# Patient Record
Sex: MALE | Race: WHITE | Employment: OTHER | URBAN - NONMETROPOLITAN AREA
[De-identification: names, ages, dates, MRNs, and addresses within clinical notes are randomized per-mention and may not be internally consistent; named-entity substitution may affect disease eponyms.]

---

## 2017-01-05 DIAGNOSIS — E11.9 TYPE 2 DIABETES MELLITUS WITHOUT COMPLICATION, WITHOUT LONG-TERM CURRENT USE OF INSULIN (HCC): Primary | ICD-10-CM

## 2017-01-06 RX ORDER — SITAGLIPTIN 100 MG/1
TABLET, FILM COATED ORAL
Qty: 30 TABLET | Refills: 11 | Status: SHIPPED | OUTPATIENT
Start: 2017-01-06 | End: 2018-01-08 | Stop reason: SDUPTHER

## 2017-01-17 ENCOUNTER — OFFICE VISIT (OUTPATIENT)
Dept: FAMILY MEDICINE CLINIC | Age: 52
End: 2017-01-17

## 2017-01-17 VITALS
OXYGEN SATURATION: 95 % | HEART RATE: 96 BPM | WEIGHT: 301 LBS | BODY MASS INDEX: 39.71 KG/M2 | DIASTOLIC BLOOD PRESSURE: 64 MMHG | SYSTOLIC BLOOD PRESSURE: 128 MMHG

## 2017-01-17 DIAGNOSIS — K21.9 GASTROESOPHAGEAL REFLUX DISEASE, ESOPHAGITIS PRESENCE NOT SPECIFIED: ICD-10-CM

## 2017-01-17 DIAGNOSIS — E78.5 HYPERLIPIDEMIA, UNSPECIFIED HYPERLIPIDEMIA TYPE: ICD-10-CM

## 2017-01-17 DIAGNOSIS — I10 ESSENTIAL HYPERTENSION: ICD-10-CM

## 2017-01-17 DIAGNOSIS — B35.4 TINEA CORPORIS: ICD-10-CM

## 2017-01-17 DIAGNOSIS — E11.9 TYPE 2 DIABETES MELLITUS WITHOUT COMPLICATION, WITHOUT LONG-TERM CURRENT USE OF INSULIN (HCC): Primary | ICD-10-CM

## 2017-01-17 DIAGNOSIS — G47.33 OBSTRUCTIVE SLEEP APNEA SYNDROME: ICD-10-CM

## 2017-01-17 PROCEDURE — 3017F COLORECTAL CA SCREEN DOC REV: CPT | Performed by: FAMILY MEDICINE

## 2017-01-17 PROCEDURE — G8419 CALC BMI OUT NRM PARAM NOF/U: HCPCS | Performed by: FAMILY MEDICINE

## 2017-01-17 PROCEDURE — G8427 DOCREV CUR MEDS BY ELIG CLIN: HCPCS | Performed by: FAMILY MEDICINE

## 2017-01-17 PROCEDURE — G8484 FLU IMMUNIZE NO ADMIN: HCPCS | Performed by: FAMILY MEDICINE

## 2017-01-17 PROCEDURE — 99214 OFFICE O/P EST MOD 30 MIN: CPT | Performed by: FAMILY MEDICINE

## 2017-01-17 PROCEDURE — 1036F TOBACCO NON-USER: CPT | Performed by: FAMILY MEDICINE

## 2017-01-17 PROCEDURE — 3044F HG A1C LEVEL LT 7.0%: CPT | Performed by: FAMILY MEDICINE

## 2017-01-17 RX ORDER — METFORMIN HYDROCHLORIDE EXTENDED-RELEASE TABLETS 1000 MG/1
1000 TABLET, FILM COATED, EXTENDED RELEASE ORAL 2 TIMES DAILY WITH MEALS
Qty: 60 TABLET | Refills: 11 | Status: SHIPPED | OUTPATIENT
Start: 2017-01-17 | End: 2018-01-20 | Stop reason: SDUPTHER

## 2017-01-17 RX ORDER — KETOCONAZOLE 20 MG/ML
SHAMPOO TOPICAL
Qty: 1 BOTTLE | Refills: 2 | Status: SHIPPED | OUTPATIENT
Start: 2017-01-17 | End: 2017-07-31 | Stop reason: ALTCHOICE

## 2017-01-17 ASSESSMENT — PATIENT HEALTH QUESTIONNAIRE - PHQ9
1. LITTLE INTEREST OR PLEASURE IN DOING THINGS: 0
SUM OF ALL RESPONSES TO PHQ QUESTIONS 1-9: 0
SUM OF ALL RESPONSES TO PHQ9 QUESTIONS 1 & 2: 0
2. FEELING DOWN, DEPRESSED OR HOPELESS: 0

## 2017-01-26 DIAGNOSIS — G47.33 OBSTRUCTIVE SLEEP APNEA SYNDROME: Primary | ICD-10-CM

## 2017-01-27 RX ORDER — NAPROXEN 500 MG/1
500 TABLET ORAL 2 TIMES DAILY WITH MEALS
Qty: 60 TABLET | Refills: 5 | Status: SHIPPED | OUTPATIENT
Start: 2017-01-27 | End: 2017-08-09 | Stop reason: SDUPTHER

## 2017-02-02 ASSESSMENT — ENCOUNTER SYMPTOMS
ABDOMINAL PAIN: 0
VOMITING: 0
NAUSEA: 0
DIARRHEA: 1

## 2017-02-09 DIAGNOSIS — K21.9 GASTROESOPHAGEAL REFLUX DISEASE, ESOPHAGITIS PRESENCE NOT SPECIFIED: Primary | ICD-10-CM

## 2017-02-10 RX ORDER — FAMOTIDINE 40 MG/1
TABLET, FILM COATED ORAL
Qty: 30 TABLET | Refills: 5 | Status: SHIPPED | OUTPATIENT
Start: 2017-02-10 | End: 2017-08-09 | Stop reason: SDUPTHER

## 2017-02-27 DIAGNOSIS — I10 ESSENTIAL HYPERTENSION: ICD-10-CM

## 2017-02-28 RX ORDER — LISINOPRIL AND HYDROCHLOROTHIAZIDE 25; 20 MG/1; MG/1
TABLET ORAL
Qty: 30 TABLET | Refills: 4 | Status: SHIPPED | OUTPATIENT
Start: 2017-02-28 | End: 2017-08-09 | Stop reason: SDUPTHER

## 2017-03-24 DIAGNOSIS — M62.838 MUSCLE SPASTICITY: Primary | ICD-10-CM

## 2017-03-24 RX ORDER — CYCLOBENZAPRINE HCL 10 MG
TABLET ORAL
Qty: 60 TABLET | Refills: 5 | Status: SHIPPED | OUTPATIENT
Start: 2017-03-24 | End: 2018-03-22 | Stop reason: SDUPTHER

## 2017-04-11 DIAGNOSIS — E11.9 TYPE 2 DIABETES MELLITUS WITHOUT COMPLICATION, WITHOUT LONG-TERM CURRENT USE OF INSULIN (HCC): Primary | ICD-10-CM

## 2017-05-07 DIAGNOSIS — I10 ESSENTIAL HYPERTENSION: Primary | ICD-10-CM

## 2017-05-08 RX ORDER — METOPROLOL TARTRATE 50 MG/1
TABLET, FILM COATED ORAL
Qty: 180 TABLET | Refills: 3 | Status: SHIPPED | OUTPATIENT
Start: 2017-05-08 | End: 2018-05-01 | Stop reason: SDUPTHER

## 2017-05-22 ENCOUNTER — TELEPHONE (OUTPATIENT)
Dept: FAMILY MEDICINE CLINIC | Age: 52
End: 2017-05-22

## 2017-05-22 DIAGNOSIS — N20.0 RECURRENT KIDNEY STONES: Primary | ICD-10-CM

## 2017-05-22 DIAGNOSIS — R30.0 DYSURIA: ICD-10-CM

## 2017-05-22 DIAGNOSIS — N30.01 ACUTE CYSTITIS WITH HEMATURIA: Primary | ICD-10-CM

## 2017-05-22 LAB
-: ABNORMAL
AMORPHOUS: ABNORMAL
BACTERIA: ABNORMAL
CASTS UA: ABNORMAL /LPF (ref 0–2)
CRYSTALS, UA: ABNORMAL /HPF
EPITHELIAL CELLS UA: ABNORMAL /HPF (ref 0–5)
MUCUS: ABNORMAL
OTHER OBSERVATIONS UA: ABNORMAL
RBC UA: ABNORMAL /HPF (ref 0–4)
RENAL EPITHELIAL, UA: ABNORMAL /HPF
TRICHOMONAS: ABNORMAL
WBC UA: >100 /HPF (ref 0–4)
YEAST: ABNORMAL

## 2017-05-22 RX ORDER — CIPROFLOXACIN 500 MG/1
500 TABLET, FILM COATED ORAL 2 TIMES DAILY
Qty: 14 TABLET | Refills: 0 | Status: SHIPPED | OUTPATIENT
Start: 2017-05-22 | End: 2017-05-29

## 2017-05-24 LAB
CULTURE: ABNORMAL
Lab: ABNORMAL
ORGANISM: ABNORMAL
SPECIMEN DESCRIPTION: ABNORMAL
STATUS: ABNORMAL

## 2017-06-29 ENCOUNTER — TELEPHONE (OUTPATIENT)
Dept: FAMILY MEDICINE CLINIC | Age: 52
End: 2017-06-29

## 2017-06-29 DIAGNOSIS — B35.4 TINEA CORPORIS: Primary | ICD-10-CM

## 2017-06-30 PROBLEM — B35.4 TINEA CORPORIS: Status: ACTIVE | Noted: 2017-06-30

## 2017-06-30 RX ORDER — FLUCONAZOLE 150 MG/1
TABLET ORAL
Qty: 4 TABLET | Refills: 0 | Status: SHIPPED | OUTPATIENT
Start: 2017-06-30 | End: 2017-07-31 | Stop reason: ALTCHOICE

## 2017-07-31 ENCOUNTER — OFFICE VISIT (OUTPATIENT)
Dept: FAMILY MEDICINE CLINIC | Age: 52
End: 2017-07-31
Payer: COMMERCIAL

## 2017-07-31 VITALS
OXYGEN SATURATION: 96 % | HEART RATE: 92 BPM | SYSTOLIC BLOOD PRESSURE: 130 MMHG | BODY MASS INDEX: 41.75 KG/M2 | DIASTOLIC BLOOD PRESSURE: 80 MMHG | WEIGHT: 315 LBS | HEIGHT: 73 IN

## 2017-07-31 DIAGNOSIS — E66.01 MORBID OBESITY WITH BMI OF 40.0-44.9, ADULT (HCC): ICD-10-CM

## 2017-07-31 DIAGNOSIS — I10 ESSENTIAL HYPERTENSION: ICD-10-CM

## 2017-07-31 DIAGNOSIS — K21.9 GASTROESOPHAGEAL REFLUX DISEASE, ESOPHAGITIS PRESENCE NOT SPECIFIED: ICD-10-CM

## 2017-07-31 DIAGNOSIS — E11.9 TYPE 2 DIABETES MELLITUS WITHOUT COMPLICATION, WITHOUT LONG-TERM CURRENT USE OF INSULIN (HCC): Primary | ICD-10-CM

## 2017-07-31 DIAGNOSIS — G47.33 OBSTRUCTIVE SLEEP APNEA SYNDROME: ICD-10-CM

## 2017-07-31 DIAGNOSIS — G80.9 CEREBRAL PALSY, UNSPECIFIED TYPE (HCC): ICD-10-CM

## 2017-07-31 PROCEDURE — 99214 OFFICE O/P EST MOD 30 MIN: CPT | Performed by: FAMILY MEDICINE

## 2017-07-31 PROCEDURE — G8427 DOCREV CUR MEDS BY ELIG CLIN: HCPCS | Performed by: FAMILY MEDICINE

## 2017-07-31 PROCEDURE — 1036F TOBACCO NON-USER: CPT | Performed by: FAMILY MEDICINE

## 2017-07-31 PROCEDURE — 3017F COLORECTAL CA SCREEN DOC REV: CPT | Performed by: FAMILY MEDICINE

## 2017-07-31 PROCEDURE — 3046F HEMOGLOBIN A1C LEVEL >9.0%: CPT | Performed by: FAMILY MEDICINE

## 2017-07-31 PROCEDURE — G8417 CALC BMI ABV UP PARAM F/U: HCPCS | Performed by: FAMILY MEDICINE

## 2017-07-31 ASSESSMENT — ENCOUNTER SYMPTOMS
DIARRHEA: 0
CONSTIPATION: 0
SHORTNESS OF BREATH: 0
ABDOMINAL PAIN: 0
WHEEZING: 0
NAUSEA: 0
VOMITING: 0

## 2017-08-09 DIAGNOSIS — K21.9 GASTROESOPHAGEAL REFLUX DISEASE, ESOPHAGITIS PRESENCE NOT SPECIFIED: ICD-10-CM

## 2017-08-09 DIAGNOSIS — I10 ESSENTIAL HYPERTENSION: ICD-10-CM

## 2017-08-09 RX ORDER — FAMOTIDINE 40 MG/1
TABLET, FILM COATED ORAL
Qty: 30 TABLET | Refills: 5 | Status: SHIPPED | OUTPATIENT
Start: 2017-08-09 | End: 2017-12-28 | Stop reason: SDUPTHER

## 2017-08-09 RX ORDER — LISINOPRIL AND HYDROCHLOROTHIAZIDE 25; 20 MG/1; MG/1
TABLET ORAL
Qty: 30 TABLET | Refills: 11 | Status: SHIPPED | OUTPATIENT
Start: 2017-08-09 | End: 2017-12-29 | Stop reason: SDUPTHER

## 2017-09-13 DIAGNOSIS — B35.4 TINEA CORPORIS: ICD-10-CM

## 2017-09-13 RX ORDER — CLOTRIMAZOLE 1 %
CREAM (GRAM) TOPICAL
Qty: 45 G | Refills: 5 | Status: SHIPPED | OUTPATIENT
Start: 2017-09-13 | End: 2018-11-12 | Stop reason: SDUPTHER

## 2017-10-31 DIAGNOSIS — B35.4 TINEA CORPORIS: ICD-10-CM

## 2017-10-31 RX ORDER — FLUCONAZOLE 150 MG/1
TABLET ORAL
Qty: 4 TABLET | Refills: 0 | Status: SHIPPED | OUTPATIENT
Start: 2017-10-31 | End: 2018-01-15 | Stop reason: SDUPTHER

## 2017-12-28 DIAGNOSIS — K21.9 GASTROESOPHAGEAL REFLUX DISEASE, ESOPHAGITIS PRESENCE NOT SPECIFIED: ICD-10-CM

## 2017-12-28 DIAGNOSIS — I10 ESSENTIAL HYPERTENSION: ICD-10-CM

## 2017-12-28 NOTE — TELEPHONE ENCOUNTER
Pt still has refills on the lisinopril-hct but is requesting it be changed to 90 day supply. Last appt 7-31-17. Next appt 2-5-18.

## 2017-12-29 RX ORDER — FAMOTIDINE 40 MG/1
TABLET, FILM COATED ORAL
Qty: 90 TABLET | Refills: 3 | Status: SHIPPED | OUTPATIENT
Start: 2017-12-29 | End: 2018-03-06 | Stop reason: SDUPTHER

## 2017-12-29 RX ORDER — LISINOPRIL AND HYDROCHLOROTHIAZIDE 25; 20 MG/1; MG/1
1 TABLET ORAL DAILY
Qty: 90 TABLET | Refills: 3 | Status: SHIPPED | OUTPATIENT
Start: 2017-12-29 | End: 2018-08-27 | Stop reason: SDUPTHER

## 2018-01-08 DIAGNOSIS — E11.9 TYPE 2 DIABETES MELLITUS WITHOUT COMPLICATION, WITHOUT LONG-TERM CURRENT USE OF INSULIN (HCC): ICD-10-CM

## 2018-01-15 DIAGNOSIS — E11.9 TYPE 2 DIABETES MELLITUS WITHOUT COMPLICATION, WITHOUT LONG-TERM CURRENT USE OF INSULIN (HCC): Primary | ICD-10-CM

## 2018-01-15 DIAGNOSIS — B35.4 TINEA CORPORIS: ICD-10-CM

## 2018-01-15 RX ORDER — GLUCOSAMINE HCL/CHONDROITIN SU 500-400 MG
CAPSULE ORAL
Qty: 100 STRIP | Refills: 3 | Status: SHIPPED | OUTPATIENT
Start: 2018-01-15 | End: 2018-01-25 | Stop reason: SDUPTHER

## 2018-01-16 RX ORDER — FLUCONAZOLE 150 MG/1
TABLET ORAL
Qty: 4 TABLET | Refills: 0 | Status: SHIPPED | OUTPATIENT
Start: 2018-01-16 | End: 2018-02-05 | Stop reason: ALTCHOICE

## 2018-01-16 NOTE — TELEPHONE ENCOUNTER
Yes- he has brown/red circular patches on his back starting at waistline & traveling about 3/4 of the way up. Skin is very flaky & itchy. Pt also mentioned that he's looking into getting a new power chair & we may be receiving forms to fill out. Told him those generally require a face to face eval & pt will keep his Feb appt for that.

## 2018-01-17 NOTE — TELEPHONE ENCOUNTER
Diflucan Rx sent in; however, if pt continues to get this recurrently, may have to refer him to [facility] for further evaluation and long-term solution. Noted - will discuss new power chair at upcoming appt next month.

## 2018-01-20 DIAGNOSIS — E11.9 TYPE 2 DIABETES MELLITUS WITHOUT COMPLICATION, WITHOUT LONG-TERM CURRENT USE OF INSULIN (HCC): ICD-10-CM

## 2018-01-22 RX ORDER — METFORMIN HYDROCHLORIDE EXTENDED-RELEASE TABLETS 1000 MG/1
TABLET, FILM COATED, EXTENDED RELEASE ORAL
Qty: 60 TABLET | Refills: 11 | Status: SHIPPED | OUTPATIENT
Start: 2018-01-22 | End: 2018-01-23 | Stop reason: DRUGHIGH

## 2018-01-23 DIAGNOSIS — E11.9 TYPE 2 DIABETES MELLITUS WITHOUT COMPLICATION, WITHOUT LONG-TERM CURRENT USE OF INSULIN (HCC): ICD-10-CM

## 2018-01-23 RX ORDER — METFORMIN HYDROCHLORIDE 500 MG/1
TABLET, EXTENDED RELEASE ORAL
Qty: 120 TABLET | Refills: 11 | Status: SHIPPED | OUTPATIENT
Start: 2018-01-23 | End: 2019-02-11 | Stop reason: SDUPTHER

## 2018-01-25 DIAGNOSIS — I10 ESSENTIAL HYPERTENSION: Primary | ICD-10-CM

## 2018-01-25 DIAGNOSIS — Z12.5 SCREENING PSA (PROSTATE SPECIFIC ANTIGEN): ICD-10-CM

## 2018-01-25 DIAGNOSIS — Z13.220 SCREENING FOR LIPOID DISORDERS: ICD-10-CM

## 2018-01-25 DIAGNOSIS — E11.9 TYPE 2 DIABETES MELLITUS WITHOUT COMPLICATION, WITHOUT LONG-TERM CURRENT USE OF INSULIN (HCC): ICD-10-CM

## 2018-01-25 RX ORDER — LANCETS 30 GAUGE
1 EACH MISCELLANEOUS
Qty: 100 EACH | Refills: 1 | Status: SHIPPED | OUTPATIENT
Start: 2018-01-26 | End: 2018-03-22 | Stop reason: SDUPTHER

## 2018-01-25 RX ORDER — GLUCOSAMINE HCL/CHONDROITIN SU 500-400 MG
CAPSULE ORAL
Qty: 100 STRIP | Refills: 1 | Status: SHIPPED | OUTPATIENT
Start: 2018-01-25 | End: 2021-03-09 | Stop reason: SDUPTHER

## 2018-01-30 ENCOUNTER — TELEPHONE (OUTPATIENT)
Dept: FAMILY MEDICINE CLINIC | Age: 53
End: 2018-01-30

## 2018-01-31 ENCOUNTER — HOSPITAL ENCOUNTER (OUTPATIENT)
Dept: LAB | Age: 53
Setting detail: SPECIMEN
Discharge: HOME | End: 2018-01-31
Payer: COMMERCIAL

## 2018-01-31 DIAGNOSIS — Z12.5 SCREENING PSA (PROSTATE SPECIFIC ANTIGEN): ICD-10-CM

## 2018-01-31 DIAGNOSIS — Z13.220 SCREENING FOR LIPOID DISORDERS: ICD-10-CM

## 2018-01-31 DIAGNOSIS — I10 ESSENTIAL HYPERTENSION: ICD-10-CM

## 2018-01-31 DIAGNOSIS — E11.9 TYPE 2 DIABETES MELLITUS WITHOUT COMPLICATION, WITHOUT LONG-TERM CURRENT USE OF INSULIN (HCC): ICD-10-CM

## 2018-01-31 LAB
ALBUMIN SERPL-MCNC: 4 G/DL (ref 3.5–5.2)
ALBUMIN/GLOBULIN RATIO: 1.4 (ref 1–2.5)
ALP BLD-CCNC: 65 U/L (ref 40–129)
ALT SERPL-CCNC: 58 U/L (ref 5–41)
ANION GAP SERPL CALCULATED.3IONS-SCNC: 16 MMOL/L (ref 9–17)
AST SERPL-CCNC: 44 U/L
BILIRUB SERPL-MCNC: 0.54 MG/DL (ref 0.3–1.2)
BUN BLDV-MCNC: 12 MG/DL (ref 6–20)
BUN/CREAT BLD: 15 (ref 9–20)
CALCIUM SERPL-MCNC: 9.2 MG/DL (ref 8.6–10.4)
CHLORIDE BLD-SCNC: 97 MMOL/L (ref 98–107)
CHOLESTEROL/HDL RATIO: 7.4
CHOLESTEROL: 155 MG/DL
CO2: 24 MMOL/L (ref 20–31)
CREAT SERPL-MCNC: 0.79 MG/DL (ref 0.7–1.2)
ESTIMATED AVERAGE GLUCOSE: 200 MG/DL
GFR AFRICAN AMERICAN: >60 ML/MIN
GFR NON-AFRICAN AMERICAN: >60 ML/MIN
GFR SERPL CREATININE-BSD FRML MDRD: ABNORMAL ML/MIN/{1.73_M2}
GFR SERPL CREATININE-BSD FRML MDRD: ABNORMAL ML/MIN/{1.73_M2}
GLUCOSE BLD-MCNC: 209 MG/DL (ref 70–99)
HBA1C MFR BLD: 8.6 % (ref 4.8–5.9)
HDLC SERPL-MCNC: 21 MG/DL
LDL CHOLESTEROL DIRECT: 87 MG/DL
LDL CHOLESTEROL: ABNORMAL MG/DL (ref 0–130)
POTASSIUM SERPL-SCNC: 3.8 MMOL/L (ref 3.7–5.3)
PROSTATE SPECIFIC ANTIGEN: 0.43 UG/L
SODIUM BLD-SCNC: 137 MMOL/L (ref 135–144)
TOTAL PROTEIN: 6.8 G/DL (ref 6.4–8.3)
TRIGL SERPL-MCNC: 417 MG/DL
VLDLC SERPL CALC-MCNC: ABNORMAL MG/DL (ref 1–30)

## 2018-01-31 PROCEDURE — G0103 PSA SCREENING: HCPCS

## 2018-01-31 PROCEDURE — 83721 ASSAY OF BLOOD LIPOPROTEIN: CPT

## 2018-01-31 PROCEDURE — 36415 COLL VENOUS BLD VENIPUNCTURE: CPT

## 2018-01-31 PROCEDURE — 80053 COMPREHEN METABOLIC PANEL: CPT

## 2018-01-31 PROCEDURE — 83036 HEMOGLOBIN GLYCOSYLATED A1C: CPT

## 2018-01-31 PROCEDURE — 80061 LIPID PANEL: CPT

## 2018-02-05 ENCOUNTER — HOSPITAL ENCOUNTER (OUTPATIENT)
Age: 53
Setting detail: SPECIMEN
Discharge: HOME | End: 2018-02-05
Payer: COMMERCIAL

## 2018-02-05 ENCOUNTER — OFFICE VISIT (OUTPATIENT)
Dept: FAMILY MEDICINE CLINIC | Age: 53
End: 2018-02-05
Payer: COMMERCIAL

## 2018-02-05 VITALS
OXYGEN SATURATION: 98 % | HEART RATE: 92 BPM | BODY MASS INDEX: 40.29 KG/M2 | WEIGHT: 304 LBS | DIASTOLIC BLOOD PRESSURE: 80 MMHG | HEIGHT: 73 IN | SYSTOLIC BLOOD PRESSURE: 110 MMHG

## 2018-02-05 DIAGNOSIS — E11.9 TYPE 2 DIABETES MELLITUS WITHOUT COMPLICATION, WITHOUT LONG-TERM CURRENT USE OF INSULIN (HCC): Primary | ICD-10-CM

## 2018-02-05 DIAGNOSIS — G80.9 CEREBRAL PALSY, UNSPECIFIED TYPE (HCC): ICD-10-CM

## 2018-02-05 DIAGNOSIS — R74.01 ELEVATED TRANSAMINASE LEVEL: ICD-10-CM

## 2018-02-05 DIAGNOSIS — R39.89 ABNORMAL URINE COLOR: ICD-10-CM

## 2018-02-05 DIAGNOSIS — I10 ESSENTIAL HYPERTENSION: ICD-10-CM

## 2018-02-05 DIAGNOSIS — N30.01 ACUTE CYSTITIS WITH HEMATURIA: ICD-10-CM

## 2018-02-05 LAB
-: ABNORMAL
AMORPHOUS: ABNORMAL
BACTERIA: ABNORMAL
BILIRUBIN URINE: ABNORMAL
CASTS UA: ABNORMAL /LPF (ref 0–2)
COLOR: ABNORMAL
COMMENT UA: ABNORMAL
CRYSTALS, UA: ABNORMAL /HPF
EPITHELIAL CELLS UA: ABNORMAL /HPF (ref 0–5)
GLUCOSE URINE: NEGATIVE
KETONES, URINE: ABNORMAL
LEUKOCYTE ESTERASE, URINE: ABNORMAL
MUCUS: ABNORMAL
NITRITE, URINE: POSITIVE
OTHER OBSERVATIONS UA: ABNORMAL
PH UA: 5.5 (ref 5–6)
PROTEIN UA: ABNORMAL
RBC UA: ABNORMAL /HPF (ref 0–4)
RENAL EPITHELIAL, UA: ABNORMAL /HPF
SPECIFIC GRAVITY UA: 1.02 (ref 1.01–1.02)
TRICHOMONAS: ABNORMAL
TURBIDITY: ABNORMAL
URINE HGB: ABNORMAL
UROBILINOGEN, URINE: NORMAL
WBC UA: >50 /HPF (ref 0–4)
YEAST: ABNORMAL

## 2018-02-05 PROCEDURE — 81001 URINALYSIS AUTO W/SCOPE: CPT

## 2018-02-05 PROCEDURE — 87088 URINE BACTERIA CULTURE: CPT

## 2018-02-05 PROCEDURE — G8427 DOCREV CUR MEDS BY ELIG CLIN: HCPCS | Performed by: FAMILY MEDICINE

## 2018-02-05 PROCEDURE — 3017F COLORECTAL CA SCREEN DOC REV: CPT | Performed by: FAMILY MEDICINE

## 2018-02-05 PROCEDURE — 99214 OFFICE O/P EST MOD 30 MIN: CPT | Performed by: FAMILY MEDICINE

## 2018-02-05 PROCEDURE — 87086 URINE CULTURE/COLONY COUNT: CPT

## 2018-02-05 PROCEDURE — G8484 FLU IMMUNIZE NO ADMIN: HCPCS | Performed by: FAMILY MEDICINE

## 2018-02-05 PROCEDURE — 3045F PR MOST RECENT HEMOGLOBIN A1C LEVEL 7.0-9.0%: CPT | Performed by: FAMILY MEDICINE

## 2018-02-05 PROCEDURE — G8417 CALC BMI ABV UP PARAM F/U: HCPCS | Performed by: FAMILY MEDICINE

## 2018-02-05 PROCEDURE — 87186 SC STD MICRODIL/AGAR DIL: CPT

## 2018-02-05 PROCEDURE — 1036F TOBACCO NON-USER: CPT | Performed by: FAMILY MEDICINE

## 2018-02-05 RX ORDER — CIPROFLOXACIN 500 MG/1
500 TABLET, FILM COATED ORAL 2 TIMES DAILY
Qty: 14 TABLET | Refills: 0 | Status: SHIPPED | OUTPATIENT
Start: 2018-02-05 | End: 2018-02-12

## 2018-02-05 RX ORDER — BLOOD-GLUCOSE METER
EACH MISCELLANEOUS
Refills: 0 | COMMUNITY
Start: 2018-01-25 | End: 2020-09-01 | Stop reason: SDUPTHER

## 2018-02-05 ASSESSMENT — PATIENT HEALTH QUESTIONNAIRE - PHQ9
SUM OF ALL RESPONSES TO PHQ QUESTIONS 1-9: 0
SUM OF ALL RESPONSES TO PHQ9 QUESTIONS 1 & 2: 0
2. FEELING DOWN, DEPRESSED OR HOPELESS: 0
1. LITTLE INTEREST OR PLEASURE IN DOING THINGS: 0

## 2018-02-05 ASSESSMENT — ENCOUNTER SYMPTOMS
VOMITING: 1
DIARRHEA: 1
NAUSEA: 0
ABDOMINAL PAIN: 0

## 2018-02-07 LAB
CULTURE: ABNORMAL
CULTURE: ABNORMAL
Lab: ABNORMAL
ORGANISM: ABNORMAL
SPECIMEN DESCRIPTION: ABNORMAL
SPECIMEN DESCRIPTION: ABNORMAL
STATUS: ABNORMAL

## 2018-02-12 DIAGNOSIS — M25.50 CHRONIC JOINT PAIN: Primary | ICD-10-CM

## 2018-02-12 DIAGNOSIS — G89.29 CHRONIC JOINT PAIN: Primary | ICD-10-CM

## 2018-02-15 DIAGNOSIS — K21.9 GASTROESOPHAGEAL REFLUX DISEASE, ESOPHAGITIS PRESENCE NOT SPECIFIED: ICD-10-CM

## 2018-02-16 RX ORDER — FAMOTIDINE 40 MG/1
TABLET, FILM COATED ORAL
Qty: 30 TABLET | Refills: 0 | OUTPATIENT
Start: 2018-02-16

## 2018-03-05 DIAGNOSIS — K21.9 GASTROESOPHAGEAL REFLUX DISEASE, ESOPHAGITIS PRESENCE NOT SPECIFIED: ICD-10-CM

## 2018-03-06 RX ORDER — FAMOTIDINE 40 MG/1
TABLET, FILM COATED ORAL
Qty: 90 TABLET | Refills: 3 | Status: SHIPPED | OUTPATIENT
Start: 2018-03-06 | End: 2019-02-11 | Stop reason: SDUPTHER

## 2018-03-06 NOTE — TELEPHONE ENCOUNTER
Niko Members states the electronic Rx on 12-29-17 must have failed because they don't have it in their system even though Epic says 'receipt confirmed'.

## 2018-03-22 DIAGNOSIS — M62.838 MUSCLE SPASTICITY: Primary | ICD-10-CM

## 2018-03-22 DIAGNOSIS — E11.9 TYPE 2 DIABETES MELLITUS WITHOUT COMPLICATION, WITHOUT LONG-TERM CURRENT USE OF INSULIN (HCC): Primary | ICD-10-CM

## 2018-03-23 RX ORDER — CYCLOBENZAPRINE HCL 10 MG
TABLET ORAL
Qty: 60 TABLET | Refills: 0 | Status: SHIPPED | OUTPATIENT
Start: 2018-03-23 | End: 2018-04-23 | Stop reason: SDUPTHER

## 2018-04-04 DIAGNOSIS — E11.9 TYPE 2 DIABETES MELLITUS WITHOUT COMPLICATION, WITHOUT LONG-TERM CURRENT USE OF INSULIN (HCC): ICD-10-CM

## 2018-04-04 RX ORDER — SITAGLIPTIN 100 MG/1
100 TABLET, FILM COATED ORAL DAILY
Qty: 30 TABLET | Refills: 5 | Status: SHIPPED | OUTPATIENT
Start: 2018-04-04 | End: 2018-10-04 | Stop reason: SDUPTHER

## 2018-04-23 DIAGNOSIS — M62.838 MUSCLE SPASTICITY: ICD-10-CM

## 2018-04-23 RX ORDER — CYCLOBENZAPRINE HCL 10 MG
TABLET ORAL
Qty: 60 TABLET | Refills: 0 | OUTPATIENT
Start: 2018-04-23

## 2018-04-23 RX ORDER — CYCLOBENZAPRINE HCL 10 MG
TABLET ORAL
Qty: 60 TABLET | Refills: 3 | Status: SHIPPED | OUTPATIENT
Start: 2018-04-23 | End: 2019-01-26 | Stop reason: SDUPTHER

## 2018-05-01 DIAGNOSIS — I10 ESSENTIAL HYPERTENSION: ICD-10-CM

## 2018-05-01 RX ORDER — METOPROLOL TARTRATE 50 MG/1
TABLET, FILM COATED ORAL
Qty: 180 TABLET | Refills: 3 | Status: SHIPPED | OUTPATIENT
Start: 2018-05-01 | End: 2019-05-07 | Stop reason: SDUPTHER

## 2018-05-17 ENCOUNTER — HOSPITAL ENCOUNTER (OUTPATIENT)
Dept: LAB | Age: 53
Setting detail: SPECIMEN
Discharge: HOME | End: 2018-05-17
Payer: COMMERCIAL

## 2018-05-17 DIAGNOSIS — E11.9 TYPE 2 DIABETES MELLITUS WITHOUT COMPLICATION, WITHOUT LONG-TERM CURRENT USE OF INSULIN (HCC): ICD-10-CM

## 2018-05-17 DIAGNOSIS — R74.01 ELEVATED TRANSAMINASE LEVEL: ICD-10-CM

## 2018-05-17 LAB
ALBUMIN SERPL-MCNC: 4.1 G/DL (ref 3.5–5.2)
ALBUMIN/GLOBULIN RATIO: 1.3 (ref 1–2.5)
ALP BLD-CCNC: 71 U/L (ref 40–129)
ALT SERPL-CCNC: 52 U/L (ref 5–41)
AST SERPL-CCNC: 37 U/L
BILIRUB SERPL-MCNC: 0.71 MG/DL (ref 0.3–1.2)
BILIRUBIN DIRECT: 0.23 MG/DL
BILIRUBIN, INDIRECT: 0.48 MG/DL (ref 0–1)
ESTIMATED AVERAGE GLUCOSE: 171 MG/DL
GLOBULIN: 3.1 G/DL (ref 1.5–3.8)
HBA1C MFR BLD: 7.6 % (ref 4.8–5.9)
TOTAL PROTEIN: 7.2 G/DL (ref 6.4–8.3)

## 2018-05-17 PROCEDURE — 80076 HEPATIC FUNCTION PANEL: CPT

## 2018-05-17 PROCEDURE — 83036 HEMOGLOBIN GLYCOSYLATED A1C: CPT

## 2018-05-17 PROCEDURE — 36415 COLL VENOUS BLD VENIPUNCTURE: CPT

## 2018-05-21 ENCOUNTER — OFFICE VISIT (OUTPATIENT)
Dept: FAMILY MEDICINE CLINIC | Age: 53
End: 2018-05-21
Payer: COMMERCIAL

## 2018-05-21 ENCOUNTER — TELEPHONE (OUTPATIENT)
Dept: PODIATRY | Age: 53
End: 2018-05-21

## 2018-05-21 VITALS
HEART RATE: 91 BPM | SYSTOLIC BLOOD PRESSURE: 132 MMHG | DIASTOLIC BLOOD PRESSURE: 70 MMHG | BODY MASS INDEX: 41.47 KG/M2 | WEIGHT: 310 LBS

## 2018-05-21 DIAGNOSIS — S91.102A OPEN WOUND OF LEFT GREAT TOE, INITIAL ENCOUNTER: ICD-10-CM

## 2018-05-21 DIAGNOSIS — E66.01 MORBID OBESITY WITH BMI OF 40.0-44.9, ADULT (HCC): ICD-10-CM

## 2018-05-21 DIAGNOSIS — E11.9 TYPE 2 DIABETES MELLITUS WITHOUT COMPLICATION, WITHOUT LONG-TERM CURRENT USE OF INSULIN (HCC): Primary | ICD-10-CM

## 2018-05-21 DIAGNOSIS — L85.3 XEROSIS OF SKIN: ICD-10-CM

## 2018-05-21 DIAGNOSIS — L30.9 CHRONIC DERMATITIS: ICD-10-CM

## 2018-05-21 DIAGNOSIS — B35.1 ONYCHOMYCOSIS: ICD-10-CM

## 2018-05-21 PROCEDURE — 3017F COLORECTAL CA SCREEN DOC REV: CPT | Performed by: FAMILY MEDICINE

## 2018-05-21 PROCEDURE — G8417 CALC BMI ABV UP PARAM F/U: HCPCS | Performed by: FAMILY MEDICINE

## 2018-05-21 PROCEDURE — 1036F TOBACCO NON-USER: CPT | Performed by: FAMILY MEDICINE

## 2018-05-21 PROCEDURE — 99214 OFFICE O/P EST MOD 30 MIN: CPT | Performed by: FAMILY MEDICINE

## 2018-05-21 PROCEDURE — 2022F DILAT RTA XM EVC RTNOPTHY: CPT | Performed by: FAMILY MEDICINE

## 2018-05-21 PROCEDURE — G8427 DOCREV CUR MEDS BY ELIG CLIN: HCPCS | Performed by: FAMILY MEDICINE

## 2018-05-21 PROCEDURE — 3045F PR MOST RECENT HEMOGLOBIN A1C LEVEL 7.0-9.0%: CPT | Performed by: FAMILY MEDICINE

## 2018-05-21 ASSESSMENT — ENCOUNTER SYMPTOMS
BLOOD IN STOOL: 0
SHORTNESS OF BREATH: 0

## 2018-05-25 ENCOUNTER — HOSPITAL ENCOUNTER (OUTPATIENT)
Age: 53
Setting detail: SPECIMEN
Discharge: HOME | End: 2018-05-25
Payer: COMMERCIAL

## 2018-05-25 ENCOUNTER — OFFICE VISIT (OUTPATIENT)
Dept: WOUND CARE | Age: 53
End: 2018-05-25
Payer: COMMERCIAL

## 2018-05-25 VITALS
BODY MASS INDEX: 41.22 KG/M2 | HEART RATE: 80 BPM | WEIGHT: 311 LBS | DIASTOLIC BLOOD PRESSURE: 76 MMHG | HEIGHT: 73 IN | SYSTOLIC BLOOD PRESSURE: 122 MMHG | TEMPERATURE: 98 F

## 2018-05-25 DIAGNOSIS — L97.522 DIABETIC ULCER OF TOE OF LEFT FOOT ASSOCIATED WITH TYPE 2 DIABETES MELLITUS, WITH FAT LAYER EXPOSED (HCC): ICD-10-CM

## 2018-05-25 DIAGNOSIS — E11.621 DIABETIC ULCER OF TOE OF LEFT FOOT ASSOCIATED WITH TYPE 2 DIABETES MELLITUS, WITH FAT LAYER EXPOSED (HCC): Primary | ICD-10-CM

## 2018-05-25 DIAGNOSIS — E11.621 DIABETIC ULCER OF TOE OF LEFT FOOT ASSOCIATED WITH TYPE 2 DIABETES MELLITUS, WITH FAT LAYER EXPOSED (HCC): ICD-10-CM

## 2018-05-25 DIAGNOSIS — L97.522 DIABETIC ULCER OF TOE OF LEFT FOOT ASSOCIATED WITH TYPE 2 DIABETES MELLITUS, WITH FAT LAYER EXPOSED (HCC): Primary | ICD-10-CM

## 2018-05-25 PROCEDURE — 87077 CULTURE AEROBIC IDENTIFY: CPT

## 2018-05-25 PROCEDURE — 87070 CULTURE OTHR SPECIMN AEROBIC: CPT

## 2018-05-25 PROCEDURE — 87205 SMEAR GRAM STAIN: CPT

## 2018-05-25 PROCEDURE — 97597 DBRDMT OPN WND 1ST 20 CM/<: CPT | Performed by: SURGERY

## 2018-05-25 PROCEDURE — 87075 CULTR BACTERIA EXCEPT BLOOD: CPT

## 2018-05-25 PROCEDURE — 86403 PARTICLE AGGLUT ANTBDY SCRN: CPT

## 2018-05-25 PROCEDURE — 87076 CULTURE ANAEROBE IDENT EACH: CPT

## 2018-05-25 PROCEDURE — 87186 SC STD MICRODIL/AGAR DIL: CPT

## 2018-05-25 RX ORDER — CEPHALEXIN 500 MG/1
1000 CAPSULE ORAL 2 TIMES DAILY
Qty: 28 CAPSULE | Refills: 0 | Status: SHIPPED | OUTPATIENT
Start: 2018-05-25 | End: 2018-05-29 | Stop reason: ALTCHOICE

## 2018-05-29 DIAGNOSIS — L97.522 DIABETIC ULCER OF TOE OF LEFT FOOT ASSOCIATED WITH TYPE 2 DIABETES MELLITUS, WITH FAT LAYER EXPOSED (HCC): Primary | ICD-10-CM

## 2018-05-29 DIAGNOSIS — E11.621 DIABETIC ULCER OF TOE OF LEFT FOOT ASSOCIATED WITH TYPE 2 DIABETES MELLITUS, WITH FAT LAYER EXPOSED (HCC): Primary | ICD-10-CM

## 2018-05-29 RX ORDER — SULFAMETHOXAZOLE AND TRIMETHOPRIM 800; 160 MG/1; MG/1
1 TABLET ORAL 2 TIMES DAILY
Qty: 20 TABLET | Refills: 0 | Status: SHIPPED | OUTPATIENT
Start: 2018-05-29 | End: 2018-06-08

## 2018-05-30 LAB
CULTURE: ABNORMAL
DIRECT EXAM: ABNORMAL
DIRECT EXAM: ABNORMAL
Lab: ABNORMAL
ORGANISM: ABNORMAL
SPECIMEN DESCRIPTION: ABNORMAL
SPECIMEN DESCRIPTION: ABNORMAL
STATUS: ABNORMAL

## 2018-06-01 ENCOUNTER — HOSPITAL ENCOUNTER (OUTPATIENT)
Dept: INTERVENTIONAL RADIOLOGY/VASCULAR | Age: 53
Discharge: HOME | End: 2018-06-03
Payer: COMMERCIAL

## 2018-06-01 DIAGNOSIS — L97.522 DIABETIC ULCER OF TOE OF LEFT FOOT ASSOCIATED WITH TYPE 2 DIABETES MELLITUS, WITH FAT LAYER EXPOSED (HCC): ICD-10-CM

## 2018-06-01 DIAGNOSIS — E11.621 DIABETIC ULCER OF TOE OF LEFT FOOT ASSOCIATED WITH TYPE 2 DIABETES MELLITUS, WITH FAT LAYER EXPOSED (HCC): ICD-10-CM

## 2018-06-01 PROCEDURE — 93923 UPR/LXTR ART STDY 3+ LVLS: CPT

## 2018-06-08 ENCOUNTER — OFFICE VISIT (OUTPATIENT)
Dept: PODIATRY | Age: 53
End: 2018-06-08
Payer: COMMERCIAL

## 2018-06-08 ENCOUNTER — OFFICE VISIT (OUTPATIENT)
Dept: WOUND CARE | Age: 53
End: 2018-06-08
Payer: COMMERCIAL

## 2018-06-08 VITALS
DIASTOLIC BLOOD PRESSURE: 80 MMHG | SYSTOLIC BLOOD PRESSURE: 126 MMHG | HEART RATE: 60 BPM | RESPIRATION RATE: 18 BRPM | TEMPERATURE: 98 F

## 2018-06-08 VITALS — TEMPERATURE: 98 F | SYSTOLIC BLOOD PRESSURE: 120 MMHG | HEART RATE: 60 BPM | DIASTOLIC BLOOD PRESSURE: 80 MMHG

## 2018-06-08 DIAGNOSIS — B35.3 TINEA PEDIS OF BOTH FEET: ICD-10-CM

## 2018-06-08 DIAGNOSIS — L97.522 DIABETIC ULCER OF TOE OF LEFT FOOT ASSOCIATED WITH TYPE 2 DIABETES MELLITUS, WITH FAT LAYER EXPOSED (HCC): Primary | ICD-10-CM

## 2018-06-08 DIAGNOSIS — B35.1 DERMATOPHYTOSIS OF NAIL: ICD-10-CM

## 2018-06-08 DIAGNOSIS — E11.51 CONTROLLED TYPE 2 DM WITH PERIPHERAL CIRCULATORY DISORDER (HCC): Primary | ICD-10-CM

## 2018-06-08 DIAGNOSIS — E11.621 DIABETIC ULCER OF TOE OF LEFT FOOT ASSOCIATED WITH TYPE 2 DIABETES MELLITUS, WITH FAT LAYER EXPOSED (HCC): Primary | ICD-10-CM

## 2018-06-08 PROCEDURE — 1036F TOBACCO NON-USER: CPT | Performed by: PODIATRIST

## 2018-06-08 PROCEDURE — 3017F COLORECTAL CA SCREEN DOC REV: CPT | Performed by: PODIATRIST

## 2018-06-08 PROCEDURE — 3045F PR MOST RECENT HEMOGLOBIN A1C LEVEL 7.0-9.0%: CPT | Performed by: PODIATRIST

## 2018-06-08 PROCEDURE — 97597 DBRDMT OPN WND 1ST 20 CM/<: CPT | Performed by: SURGERY

## 2018-06-08 PROCEDURE — 11721 DEBRIDE NAIL 6 OR MORE: CPT | Performed by: PODIATRIST

## 2018-06-08 PROCEDURE — 99202 OFFICE O/P NEW SF 15 MIN: CPT | Performed by: PODIATRIST

## 2018-06-08 PROCEDURE — G8417 CALC BMI ABV UP PARAM F/U: HCPCS | Performed by: PODIATRIST

## 2018-06-08 PROCEDURE — 2022F DILAT RTA XM EVC RTNOPTHY: CPT | Performed by: PODIATRIST

## 2018-06-08 PROCEDURE — G8427 DOCREV CUR MEDS BY ELIG CLIN: HCPCS | Performed by: PODIATRIST

## 2018-06-14 ENCOUNTER — TELEPHONE (OUTPATIENT)
Dept: WOUND CARE | Age: 53
End: 2018-06-14

## 2018-06-15 ENCOUNTER — OFFICE VISIT (OUTPATIENT)
Dept: WOUND CARE | Age: 53
End: 2018-06-15
Payer: COMMERCIAL

## 2018-06-15 VITALS
TEMPERATURE: 97.8 F | RESPIRATION RATE: 14 BRPM | DIASTOLIC BLOOD PRESSURE: 78 MMHG | SYSTOLIC BLOOD PRESSURE: 122 MMHG | HEART RATE: 84 BPM

## 2018-06-15 DIAGNOSIS — L97.522 DIABETIC ULCER OF TOE OF LEFT FOOT ASSOCIATED WITH TYPE 2 DIABETES MELLITUS, WITH FAT LAYER EXPOSED (HCC): Primary | ICD-10-CM

## 2018-06-15 DIAGNOSIS — E11.621 DIABETIC ULCER OF TOE OF LEFT FOOT ASSOCIATED WITH TYPE 2 DIABETES MELLITUS, WITH FAT LAYER EXPOSED (HCC): Primary | ICD-10-CM

## 2018-06-15 PROCEDURE — 97597 DBRDMT OPN WND 1ST 20 CM/<: CPT | Performed by: SURGERY

## 2018-06-22 ENCOUNTER — OFFICE VISIT (OUTPATIENT)
Dept: WOUND CARE | Age: 53
End: 2018-06-22
Payer: COMMERCIAL

## 2018-06-22 VITALS
HEART RATE: 88 BPM | HEIGHT: 73 IN | TEMPERATURE: 98.4 F | WEIGHT: 305 LBS | SYSTOLIC BLOOD PRESSURE: 126 MMHG | BODY MASS INDEX: 40.42 KG/M2 | DIASTOLIC BLOOD PRESSURE: 88 MMHG

## 2018-06-22 DIAGNOSIS — E11.621 DIABETIC ULCER OF TOE OF LEFT FOOT ASSOCIATED WITH TYPE 2 DIABETES MELLITUS, WITH FAT LAYER EXPOSED (HCC): Primary | ICD-10-CM

## 2018-06-22 DIAGNOSIS — L97.522 DIABETIC ULCER OF TOE OF LEFT FOOT ASSOCIATED WITH TYPE 2 DIABETES MELLITUS, WITH FAT LAYER EXPOSED (HCC): Primary | ICD-10-CM

## 2018-06-22 PROCEDURE — 3045F PR MOST RECENT HEMOGLOBIN A1C LEVEL 7.0-9.0%: CPT | Performed by: SURGERY

## 2018-06-22 PROCEDURE — 1036F TOBACCO NON-USER: CPT | Performed by: SURGERY

## 2018-06-22 PROCEDURE — 3017F COLORECTAL CA SCREEN DOC REV: CPT | Performed by: SURGERY

## 2018-06-22 PROCEDURE — G8427 DOCREV CUR MEDS BY ELIG CLIN: HCPCS | Performed by: SURGERY

## 2018-06-22 PROCEDURE — 2022F DILAT RTA XM EVC RTNOPTHY: CPT | Performed by: SURGERY

## 2018-06-22 PROCEDURE — G8417 CALC BMI ABV UP PARAM F/U: HCPCS | Performed by: SURGERY

## 2018-06-22 PROCEDURE — 99212 OFFICE O/P EST SF 10 MIN: CPT | Performed by: SURGERY

## 2018-06-28 ENCOUNTER — TELEPHONE (OUTPATIENT)
Dept: WOUND CARE | Age: 53
End: 2018-06-28

## 2018-07-27 DIAGNOSIS — I10 ESSENTIAL HYPERTENSION: ICD-10-CM

## 2018-07-27 NOTE — TELEPHONE ENCOUNTER
Pt just had this refilled for #90 with 3 refills on 12/29/17 - please confirm that he does not need this refilled again now.

## 2018-07-30 RX ORDER — LISINOPRIL AND HYDROCHLOROTHIAZIDE 25; 20 MG/1; MG/1
1 TABLET ORAL DAILY
Qty: 30 TABLET | Refills: 0 | Status: SHIPPED | OUTPATIENT
Start: 2018-07-30 | End: 2018-08-27 | Stop reason: SDUPTHER

## 2018-08-21 ENCOUNTER — HOSPITAL ENCOUNTER (OUTPATIENT)
Dept: LAB | Age: 53
Setting detail: SPECIMEN
Discharge: HOME | End: 2018-08-21
Payer: COMMERCIAL

## 2018-08-21 DIAGNOSIS — E11.9 TYPE 2 DIABETES MELLITUS WITHOUT COMPLICATION, WITHOUT LONG-TERM CURRENT USE OF INSULIN (HCC): ICD-10-CM

## 2018-08-21 LAB
CREATININE URINE: 182.5 MG/DL (ref 39–259)
ESTIMATED AVERAGE GLUCOSE: 177 MG/DL
HBA1C MFR BLD: 7.8 % (ref 4.8–5.9)
MICROALBUMIN/CREAT 24H UR: 27 MG/L
MICROALBUMIN/CREAT UR-RTO: 15 MCG/MG CREAT

## 2018-08-21 PROCEDURE — 82043 UR ALBUMIN QUANTITATIVE: CPT

## 2018-08-21 PROCEDURE — 83036 HEMOGLOBIN GLYCOSYLATED A1C: CPT

## 2018-08-21 PROCEDURE — 36415 COLL VENOUS BLD VENIPUNCTURE: CPT

## 2018-08-21 PROCEDURE — 82570 ASSAY OF URINE CREATININE: CPT

## 2018-08-22 ENCOUNTER — TELEPHONE (OUTPATIENT)
Dept: WOUND CARE | Age: 53
End: 2018-08-22

## 2018-08-23 DIAGNOSIS — E11.9 TYPE 2 DIABETES MELLITUS WITHOUT COMPLICATION, WITHOUT LONG-TERM CURRENT USE OF INSULIN (HCC): Primary | ICD-10-CM

## 2018-08-26 DIAGNOSIS — E11.9 TYPE 2 DIABETES MELLITUS WITHOUT COMPLICATION, WITHOUT LONG-TERM CURRENT USE OF INSULIN (HCC): ICD-10-CM

## 2018-08-26 DIAGNOSIS — I10 ESSENTIAL HYPERTENSION: ICD-10-CM

## 2018-08-27 RX ORDER — LISINOPRIL AND HYDROCHLOROTHIAZIDE 25; 20 MG/1; MG/1
1 TABLET ORAL DAILY
Qty: 90 TABLET | Refills: 3 | Status: SHIPPED | OUTPATIENT
Start: 2018-08-27 | End: 2019-09-09 | Stop reason: SDUPTHER

## 2018-08-27 NOTE — TELEPHONE ENCOUNTER
Bydureon requires a prior auth. When submitting on CoverMymeds, it states that Trulicity (or Victoza) is preferred. Ok to change to Trulicity?

## 2018-08-28 ENCOUNTER — OFFICE VISIT (OUTPATIENT)
Dept: PODIATRY | Age: 53
End: 2018-08-28
Payer: COMMERCIAL

## 2018-08-28 VITALS — RESPIRATION RATE: 20 BRPM | SYSTOLIC BLOOD PRESSURE: 118 MMHG | HEART RATE: 80 BPM | DIASTOLIC BLOOD PRESSURE: 60 MMHG

## 2018-08-28 DIAGNOSIS — E11.51 CONTROLLED TYPE 2 DM WITH PERIPHERAL CIRCULATORY DISORDER (HCC): Primary | ICD-10-CM

## 2018-08-28 DIAGNOSIS — B35.1 DERMATOPHYTOSIS OF NAIL: ICD-10-CM

## 2018-08-28 PROCEDURE — 11721 DEBRIDE NAIL 6 OR MORE: CPT | Performed by: PODIATRIST

## 2018-08-28 NOTE — PROGRESS NOTES
w/Device KIT USE AS DIRECTED 1/25/18  Yes Historical Provider, MD   Glucose Blood (BLOOD GLUCOSE TEST STRIPS) STRP TEST THREE TIMES WEEKLY AS DIRECTED. Please fill per insurance coverage. 1/25/18  Yes Claire Thakkar, DO   metFORMIN (GLUCOPHAGE-XR) 500 MG extended release tablet TAKE 2 TABLETS BY MOUTH TWICE DAILY 1/23/18  Yes Claire Thakkar, DO   clotrimazole (LOTRIMIN) 1 % cream Apply topically to affected areas twice daily as needed. 9/13/17  Yes Claire Thakkar, DO   Respiratory Therapy Supplies MISC BPAP machine and supplies. Pressure setting at 16/18 cm. 2/1/17  Yes Gissel Guzman, DO   Misc. Devices [facility]) MISC 1 Device by Does not apply route daily 10/26/15  Yes Gissel Guzman, DO   Handicap Placard MISC by Does not apply route Diagnosis: 343.9, 719.45, 722.52  Expires 12/31/2020 9/8/15  Yes Claire Thakkar, DO   aspirin 81 MG tablet Take 81 mg by mouth daily. Yes Historical Provider, MD   omeprazole (PRILOSEC) 20 MG capsule Take 20 mg by mouth daily. Yes Historical Provider, MD       Past Surgical History:   Procedure Laterality Date    CARPAL TUNNEL RELEASE Right 09/21/2009    CHOLECYSTECTOMY  09/14/2016    laparscopic w/op grams    COLONOSCOPY  10/1/15    normal    HIP OSTEOTOMY Left 1989    HIP OSTEOTOMY Right 1983    Varus derotation.  LITHOTRIPSY Right 06/04/1991    With cystoscopy and ureteral catheter placement.  LUMBAR FUSION  June 2005    L4-L5.  OTHER SURGICAL HISTORY      pelvic surgery    TOTAL HIP ARTHROPLASTY Left 1999       Family History   Problem Relation Age of Onset    Heart Disease Mother     Heart Disease Father     Heart Attack Brother         age 46    Heart Disease Paternal Uncle        Social History   Substance Use Topics    Smoking status: Former Smoker     Types: Cigarettes, Cigars     Quit date: 10/9/1999    Smokeless tobacco: Never Used      Comment: quit smoking 10/9/1999. Christian Holm P, 9/14/2016.     Alcohol use Yes     12 Standard drinks or Current condition and treatment options discussed in detail. DM foot ed and exam.      Orders Placed This Encounter   Procedures    HM DIABETES FOOT EXAM    IA DEBRIDEMENT OF NAILS, 6 OR MORE     All nails as mentioned above debrided in length and thickness. Patient advised OTC methods for treatment of the mycotic nails. Patient will follow up in 10 weeks. Contact office with any questions/problems/concerns.

## 2018-08-28 NOTE — PROGRESS NOTES
Foot Care Worksheet  PCP: Kat Sanz DO  Last visit: 5/21/18    Nail description:  Thick , Yellow , Crumbly , Marked limitation of ambulation     Pain resulting from thickened and dystrophy of nail plate No    Nails involved  Right   1, 2, 3, 4, 5  (T5-T9)  Left     1, 2, 3, 4, 5  (TA-T4)    Q7 1 Class A Finding - Non traumatic amputation of foot No    Q8 2 Class B Findings - Absent DP pulse No, Absent PT pulse No, Advanced tropic changes (3 required) Yes    Decrease hair growth No, Nail changes/thickening Yes, Pigmented changes/discoloration Yes, Skin texture (thin, shiny) Yes, Skin color (rubor/redness) No    Q9 1 Class B and 2 Class C Findings  Claudication No, Temperature change Yes, Paresthesia Yes, Burning No, Edema Yes

## 2018-10-01 NOTE — TELEPHONE ENCOUNTER
Last Appt:  Visit date 5/21/18 KB  Next Appt:   Visit date not found  Med verified in [address]

## 2018-10-04 DIAGNOSIS — E11.9 TYPE 2 DIABETES MELLITUS WITHOUT COMPLICATION, WITHOUT LONG-TERM CURRENT USE OF INSULIN (HCC): ICD-10-CM

## 2018-10-04 RX ORDER — SITAGLIPTIN 100 MG/1
100 TABLET, FILM COATED ORAL DAILY
Qty: 30 TABLET | Refills: 11 | Status: SHIPPED | OUTPATIENT
Start: 2018-10-04 | End: 2019-04-01 | Stop reason: DRUGHIGH

## 2018-10-05 ENCOUNTER — OFFICE VISIT (OUTPATIENT)
Dept: FAMILY MEDICINE CLINIC | Age: 53
End: 2018-10-05
Payer: COMMERCIAL

## 2018-10-05 VITALS
DIASTOLIC BLOOD PRESSURE: 66 MMHG | WEIGHT: 306 LBS | SYSTOLIC BLOOD PRESSURE: 130 MMHG | HEART RATE: 99 BPM | OXYGEN SATURATION: 97 % | BODY MASS INDEX: 40.91 KG/M2

## 2018-10-05 DIAGNOSIS — R22.1 LUMP IN NECK: Primary | ICD-10-CM

## 2018-10-05 DIAGNOSIS — R07.81 RIB PAIN ON RIGHT SIDE: ICD-10-CM

## 2018-10-05 PROCEDURE — 99214 OFFICE O/P EST MOD 30 MIN: CPT | Performed by: FAMILY MEDICINE

## 2018-10-05 PROCEDURE — 1036F TOBACCO NON-USER: CPT | Performed by: FAMILY MEDICINE

## 2018-10-05 PROCEDURE — G8427 DOCREV CUR MEDS BY ELIG CLIN: HCPCS | Performed by: FAMILY MEDICINE

## 2018-10-05 PROCEDURE — G8484 FLU IMMUNIZE NO ADMIN: HCPCS | Performed by: FAMILY MEDICINE

## 2018-10-05 PROCEDURE — G8417 CALC BMI ABV UP PARAM F/U: HCPCS | Performed by: FAMILY MEDICINE

## 2018-10-05 PROCEDURE — 3017F COLORECTAL CA SCREEN DOC REV: CPT | Performed by: FAMILY MEDICINE

## 2018-10-05 NOTE — PATIENT INSTRUCTIONS
15 to 30 seconds. Do this 3 or 4 times a day. Stretch just after you have applied heat. · As your pain gets better, slowly return to your normal activities. Any increased pain may be a sign that you need to rest a while longer. When should you call for help? Call 911 anytime you think you may need emergency care. For example, call if:    · You have chest pain or pressure. This may occur with:  ¨ Sweating. ¨ Shortness of breath. ¨ Nausea or vomiting. ¨ Pain that spreads from the chest to the neck, jaw, or one or both shoulders or arms. ¨ Dizziness or lightheadedness. ¨ A fast or uneven pulse. After calling 911, chew 1 adult-strength aspirin. Wait for an ambulance. Do not try to drive yourself.     · You have sudden chest pain and shortness of breath, or you cough up blood.    Call your doctor now or seek immediate medical care if:    · You have any trouble breathing.     · Your chest pain gets worse.     · Your chest pain occurs consistently with exercise and is relieved by rest.    Watch closely for changes in your health, and be sure to contact your doctor if:    · Your chest pain does not get better after 1 week. Where can you learn more? Go to https://[employer]. org and sign in to your sciencebite account. Enter [id number] [id number] in the [facility] box to learn more about \"Musculoskeletal Chest Pain: Care Instructions. \"     If you do not have an account, please click on the \"Sign Up Now\" link. Current as of: November 20, 2017  Content Version: 11.7  © 6024-7320 [employer]. Care instructions adapted under license by [facility] [employer] ([employer]) [facility] ([facility]). If you have questions about a medical condition or this instruction, always ask your healthcare professional. [place] [address] any warranty or liability for your use of this information.

## 2018-10-06 ASSESSMENT — ENCOUNTER SYMPTOMS
BACK PAIN: 1
TROUBLE SWALLOWING: 0

## 2018-10-10 ENCOUNTER — HOSPITAL ENCOUNTER (OUTPATIENT)
Dept: ULTRASOUND IMAGING | Age: 53
Discharge: HOME | End: 2018-10-12
Payer: COMMERCIAL

## 2018-10-10 DIAGNOSIS — R22.1 LUMP IN NECK: ICD-10-CM

## 2018-10-10 PROCEDURE — 76536 US EXAM OF HEAD AND NECK: CPT

## 2018-11-06 ENCOUNTER — OFFICE VISIT (OUTPATIENT)
Dept: PODIATRY | Age: 53
End: 2018-11-06
Payer: COMMERCIAL

## 2018-11-06 VITALS
BODY MASS INDEX: 41.85 KG/M2 | SYSTOLIC BLOOD PRESSURE: 128 MMHG | WEIGHT: 309 LBS | HEIGHT: 72 IN | DIASTOLIC BLOOD PRESSURE: 78 MMHG

## 2018-11-06 DIAGNOSIS — B35.1 DERMATOPHYTOSIS OF NAIL: ICD-10-CM

## 2018-11-06 DIAGNOSIS — E11.51 CONTROLLED TYPE 2 DM WITH PERIPHERAL CIRCULATORY DISORDER (HCC): Primary | ICD-10-CM

## 2018-11-06 PROCEDURE — 11721 DEBRIDE NAIL 6 OR MORE: CPT | Performed by: PODIATRIST

## 2018-11-06 PROCEDURE — 99999 PR OFFICE/OUTPT VISIT,PROCEDURE ONLY: CPT | Performed by: PODIATRIST

## 2018-11-12 DIAGNOSIS — B35.4 TINEA CORPORIS: ICD-10-CM

## 2018-11-12 RX ORDER — CLOTRIMAZOLE 1 %
CREAM (GRAM) TOPICAL
Qty: 45 G | Refills: 2 | Status: SHIPPED | OUTPATIENT
Start: 2018-11-12 | End: 2019-10-01

## 2018-11-17 DIAGNOSIS — E11.9 TYPE 2 DIABETES MELLITUS WITHOUT COMPLICATION, WITHOUT LONG-TERM CURRENT USE OF INSULIN (HCC): ICD-10-CM

## 2018-11-19 RX ORDER — DULAGLUTIDE 0.75 MG/.5ML
INJECTION, SOLUTION SUBCUTANEOUS
Qty: 2 ML | Refills: 5 | Status: SHIPPED | OUTPATIENT
Start: 2018-11-19 | End: 2019-04-28 | Stop reason: SDUPTHER

## 2018-11-26 ENCOUNTER — HOSPITAL ENCOUNTER (OUTPATIENT)
Dept: LAB | Age: 53
Discharge: HOME | End: 2018-11-26
Payer: COMMERCIAL

## 2018-11-26 ENCOUNTER — OFFICE VISIT (OUTPATIENT)
Dept: FAMILY MEDICINE CLINIC | Age: 53
End: 2018-11-26
Payer: COMMERCIAL

## 2018-11-26 VITALS
HEART RATE: 92 BPM | WEIGHT: 306 LBS | SYSTOLIC BLOOD PRESSURE: 112 MMHG | BODY MASS INDEX: 41.45 KG/M2 | HEIGHT: 72 IN | DIASTOLIC BLOOD PRESSURE: 80 MMHG

## 2018-11-26 DIAGNOSIS — G47.33 OBSTRUCTIVE SLEEP APNEA SYNDROME: ICD-10-CM

## 2018-11-26 DIAGNOSIS — B35.4 TINEA CORPORIS: ICD-10-CM

## 2018-11-26 DIAGNOSIS — E11.9 TYPE 2 DIABETES MELLITUS WITHOUT COMPLICATION, WITHOUT LONG-TERM CURRENT USE OF INSULIN (HCC): ICD-10-CM

## 2018-11-26 DIAGNOSIS — I10 ESSENTIAL HYPERTENSION: ICD-10-CM

## 2018-11-26 DIAGNOSIS — Z00.00 ROUTINE GENERAL MEDICAL EXAMINATION AT A HEALTH CARE FACILITY: Primary | ICD-10-CM

## 2018-11-26 LAB
ESTIMATED AVERAGE GLUCOSE: 140 MG/DL
HBA1C MFR BLD: 6.5 % (ref 4.8–5.9)

## 2018-11-26 PROCEDURE — G0438 PPPS, INITIAL VISIT: HCPCS | Performed by: FAMILY MEDICINE

## 2018-11-26 PROCEDURE — G8417 CALC BMI ABV UP PARAM F/U: HCPCS | Performed by: FAMILY MEDICINE

## 2018-11-26 PROCEDURE — G8427 DOCREV CUR MEDS BY ELIG CLIN: HCPCS | Performed by: FAMILY MEDICINE

## 2018-11-26 PROCEDURE — G8484 FLU IMMUNIZE NO ADMIN: HCPCS | Performed by: FAMILY MEDICINE

## 2018-11-26 PROCEDURE — 1036F TOBACCO NON-USER: CPT | Performed by: FAMILY MEDICINE

## 2018-11-26 PROCEDURE — 3044F HG A1C LEVEL LT 7.0%: CPT | Performed by: FAMILY MEDICINE

## 2018-11-26 PROCEDURE — 83036 HEMOGLOBIN GLYCOSYLATED A1C: CPT

## 2018-11-26 PROCEDURE — 36415 COLL VENOUS BLD VENIPUNCTURE: CPT

## 2018-11-26 PROCEDURE — 2022F DILAT RTA XM EVC RTNOPTHY: CPT | Performed by: FAMILY MEDICINE

## 2018-11-26 PROCEDURE — 99214 OFFICE O/P EST MOD 30 MIN: CPT | Performed by: FAMILY MEDICINE

## 2018-11-26 PROCEDURE — 3017F COLORECTAL CA SCREEN DOC REV: CPT | Performed by: FAMILY MEDICINE

## 2018-11-26 ASSESSMENT — PATIENT HEALTH QUESTIONNAIRE - PHQ9
SUM OF ALL RESPONSES TO PHQ QUESTIONS 1-9: 0
2. FEELING DOWN, DEPRESSED OR HOPELESS: 0
SUM OF ALL RESPONSES TO PHQ QUESTIONS 1-9: 0
1. LITTLE INTEREST OR PLEASURE IN DOING THINGS: 0
SUM OF ALL RESPONSES TO PHQ9 QUESTIONS 1 & 2: 0

## 2018-11-26 ASSESSMENT — LIFESTYLE VARIABLES
HOW MANY STANDARD DRINKS CONTAINING ALCOHOL DO YOU HAVE ON A TYPICAL DAY: 0
HOW OFTEN DURING THE LAST YEAR HAVE YOU BEEN UNABLE TO REMEMBER WHAT HAPPENED THE NIGHT BEFORE BECAUSE YOU HAD BEEN DRINKING: 0
AUDIT TOTAL SCORE: 6
HAS A RELATIVE, FRIEND, DOCTOR, OR ANOTHER HEALTH PROFESSIONAL EXPRESSED CONCERN ABOUT YOUR DRINKING OR SUGGESTED YOU CUT DOWN: 0
HOW OFTEN DURING THE LAST YEAR HAVE YOU HAD A FEELING OF GUILT OR REMORSE AFTER DRINKING: 0
HAVE YOU OR SOMEONE ELSE BEEN INJURED AS A RESULT OF YOUR DRINKING: 0
HOW OFTEN DURING THE LAST YEAR HAVE YOU FOUND THAT YOU WERE NOT ABLE TO STOP DRINKING ONCE YOU HAD STARTED: 0
HOW OFTEN DURING THE LAST YEAR HAVE YOU FAILED TO DO WHAT WAS NORMALLY EXPECTED FROM YOU BECAUSE OF DRINKING: 0
HOW OFTEN DURING THE LAST YEAR HAVE YOU NEEDED AN ALCOHOLIC DRINK FIRST THING IN THE MORNING TO GET YOURSELF GOING AFTER A NIGHT OF HEAVY DRINKING: 0
HOW OFTEN DO YOU HAVE A DRINK CONTAINING ALCOHOL: 3
HOW OFTEN DO YOU HAVE SIX OR MORE DRINKS ON ONE OCCASION: 3
AUDIT-C TOTAL SCORE: 6

## 2018-11-26 ASSESSMENT — ANXIETY QUESTIONNAIRES: GAD7 TOTAL SCORE: 0

## 2018-11-26 ASSESSMENT — ENCOUNTER SYMPTOMS
BLOOD IN STOOL: 0
SHORTNESS OF BREATH: 0

## 2018-11-26 NOTE — PROGRESS NOTES
A1C test (Diabetic or Prediabetic)  11/26/2019    DTaP/Tdap/Td vaccine (2 - Td) 05/11/2025    Colon cancer screen colonoscopy  10/01/2025    Pneumococcal med risk  Completed    Hepatitis C screen  Completed    HIV screen  Completed     Recommendations for Preventive Services Due: see orders and patient instructions/AVS.  .   Recommended screening schedule for the next 5-10 years is provided to the patient in written form: see Patient Instructions/AVS.

## 2018-11-27 DIAGNOSIS — E11.9 TYPE 2 DIABETES MELLITUS WITHOUT COMPLICATION, WITHOUT LONG-TERM CURRENT USE OF INSULIN (HCC): Primary | ICD-10-CM

## 2019-01-15 ENCOUNTER — OFFICE VISIT (OUTPATIENT)
Dept: PODIATRY | Age: 54
End: 2019-01-15
Payer: COMMERCIAL

## 2019-01-15 VITALS
HEART RATE: 74 BPM | DIASTOLIC BLOOD PRESSURE: 64 MMHG | HEIGHT: 73 IN | SYSTOLIC BLOOD PRESSURE: 118 MMHG | BODY MASS INDEX: 40.37 KG/M2

## 2019-01-15 DIAGNOSIS — E11.51 CONTROLLED TYPE 2 DM WITH PERIPHERAL CIRCULATORY DISORDER (HCC): Primary | ICD-10-CM

## 2019-01-15 DIAGNOSIS — B35.1 DERMATOPHYTOSIS OF NAIL: ICD-10-CM

## 2019-01-15 PROCEDURE — 11721 DEBRIDE NAIL 6 OR MORE: CPT | Performed by: PODIATRIST

## 2019-01-15 PROCEDURE — 99999 PR OFFICE/OUTPT VISIT,PROCEDURE ONLY: CPT | Performed by: PODIATRIST

## 2019-01-26 DIAGNOSIS — M62.838 MUSCLE SPASTICITY: ICD-10-CM

## 2019-01-28 ENCOUNTER — HOSPITAL ENCOUNTER (OUTPATIENT)
Dept: LAB | Age: 54
Discharge: HOME | End: 2019-01-28
Payer: COMMERCIAL

## 2019-01-28 DIAGNOSIS — E11.9 TYPE 2 DIABETES MELLITUS WITHOUT COMPLICATION, WITHOUT LONG-TERM CURRENT USE OF INSULIN (HCC): ICD-10-CM

## 2019-01-28 LAB
ESTIMATED AVERAGE GLUCOSE: 120 MG/DL
HBA1C MFR BLD: 5.8 % (ref 4.8–5.9)

## 2019-01-28 PROCEDURE — 36415 COLL VENOUS BLD VENIPUNCTURE: CPT

## 2019-01-28 PROCEDURE — 83036 HEMOGLOBIN GLYCOSYLATED A1C: CPT

## 2019-01-29 RX ORDER — CYCLOBENZAPRINE HCL 10 MG
TABLET ORAL
Qty: 60 TABLET | Refills: 5 | Status: SHIPPED | OUTPATIENT
Start: 2019-01-29 | End: 2020-02-03

## 2019-02-10 DIAGNOSIS — G89.29 CHRONIC JOINT PAIN: ICD-10-CM

## 2019-02-10 DIAGNOSIS — M25.50 CHRONIC JOINT PAIN: ICD-10-CM

## 2019-02-11 DIAGNOSIS — E11.9 TYPE 2 DIABETES MELLITUS WITHOUT COMPLICATION, WITHOUT LONG-TERM CURRENT USE OF INSULIN (HCC): ICD-10-CM

## 2019-02-11 DIAGNOSIS — K21.9 GASTROESOPHAGEAL REFLUX DISEASE, ESOPHAGITIS PRESENCE NOT SPECIFIED: ICD-10-CM

## 2019-02-12 RX ORDER — METFORMIN HYDROCHLORIDE 500 MG/1
TABLET, EXTENDED RELEASE ORAL
Qty: 120 TABLET | Refills: 11 | Status: SHIPPED | OUTPATIENT
Start: 2019-02-12 | End: 2020-02-01

## 2019-02-12 RX ORDER — FAMOTIDINE 40 MG/1
TABLET, FILM COATED ORAL
Qty: 30 TABLET | Refills: 11 | Status: SHIPPED | OUTPATIENT
Start: 2019-02-12 | End: 2020-02-04

## 2019-02-21 ENCOUNTER — TELEPHONE (OUTPATIENT)
Dept: FAMILY MEDICINE CLINIC | Age: 54
End: 2019-02-21

## 2019-02-21 DIAGNOSIS — M25.50 ARTHRALGIA OF MULTIPLE JOINTS: ICD-10-CM

## 2019-02-21 DIAGNOSIS — M47.816 OSTEOARTHRITIS OF LUMBAR SPINE, UNSPECIFIED SPINAL OSTEOARTHRITIS COMPLICATION STATUS: Primary | ICD-10-CM

## 2019-02-21 RX ORDER — DICLOFENAC SODIUM 75 MG/1
75 TABLET, DELAYED RELEASE ORAL 2 TIMES DAILY
Qty: 60 TABLET | Refills: 0 | Status: SHIPPED | OUTPATIENT
Start: 2019-02-21 | End: 2019-03-20

## 2019-03-20 ENCOUNTER — OFFICE VISIT (OUTPATIENT)
Dept: PRIMARY CARE CLINIC | Age: 54
End: 2019-03-20

## 2019-03-20 ENCOUNTER — APPOINTMENT (OUTPATIENT)
Dept: CT IMAGING | Age: 54
End: 2019-03-20
Payer: COMMERCIAL

## 2019-03-20 ENCOUNTER — HOSPITAL ENCOUNTER (EMERGENCY)
Age: 54
Discharge: HOME | End: 2019-03-20
Attending: EMERGENCY MEDICINE
Payer: COMMERCIAL

## 2019-03-20 ENCOUNTER — HOSPITAL ENCOUNTER (OUTPATIENT)
Age: 54
Setting detail: SPECIMEN
Discharge: HOME | End: 2019-03-20
Payer: COMMERCIAL

## 2019-03-20 VITALS
SYSTOLIC BLOOD PRESSURE: 124 MMHG | HEIGHT: 73 IN | HEART RATE: 98 BPM | TEMPERATURE: 98.7 F | OXYGEN SATURATION: 96 % | DIASTOLIC BLOOD PRESSURE: 72 MMHG | BODY MASS INDEX: 40.42 KG/M2 | WEIGHT: 305 LBS

## 2019-03-20 VITALS
RESPIRATION RATE: 16 BRPM | BODY MASS INDEX: 40.42 KG/M2 | DIASTOLIC BLOOD PRESSURE: 76 MMHG | TEMPERATURE: 99 F | OXYGEN SATURATION: 95 % | SYSTOLIC BLOOD PRESSURE: 126 MMHG | WEIGHT: 305 LBS | HEIGHT: 73 IN | HEART RATE: 95 BPM

## 2019-03-20 DIAGNOSIS — R30.0 DYSURIA: ICD-10-CM

## 2019-03-20 DIAGNOSIS — R31.9 HEMATURIA, UNSPECIFIED TYPE: Primary | ICD-10-CM

## 2019-03-20 DIAGNOSIS — H53.2 DIPLOPIA: ICD-10-CM

## 2019-03-20 DIAGNOSIS — R30.0 DYSURIA: Primary | ICD-10-CM

## 2019-03-20 LAB
-: ABNORMAL
AMORPHOUS: ABNORMAL
BACTERIA: ABNORMAL
BILIRUBIN URINE: NEGATIVE
CASTS UA: ABNORMAL /LPF (ref 0–2)
CASTS UA: ABNORMAL /LPF (ref 0–2)
COLOR: ABNORMAL
COMMENT UA: ABNORMAL
CRYSTALS, UA: ABNORMAL /HPF
EPITHELIAL CELLS UA: ABNORMAL /HPF (ref 0–5)
GLUCOSE URINE: NEGATIVE
KETONES, URINE: NEGATIVE
LEUKOCYTE ESTERASE, URINE: NEGATIVE
MUCUS: ABNORMAL
NITRITE, URINE: NEGATIVE
OTHER OBSERVATIONS UA: ABNORMAL
PH UA: 5.5 (ref 5–6)
PROTEIN UA: NEGATIVE
RBC UA: ABNORMAL /HPF (ref 0–4)
RENAL EPITHELIAL, UA: ABNORMAL /HPF
SPECIFIC GRAVITY UA: 1.02 (ref 1.01–1.02)
TRICHOMONAS: ABNORMAL
TURBIDITY: ABNORMAL
URINE HGB: ABNORMAL
UROBILINOGEN, URINE: NORMAL
WBC UA: ABNORMAL /HPF (ref 0–4)
YEAST: ABNORMAL

## 2019-03-20 PROCEDURE — 81001 URINALYSIS AUTO W/SCOPE: CPT

## 2019-03-20 PROCEDURE — 70450 CT HEAD/BRAIN W/O DYE: CPT

## 2019-03-20 PROCEDURE — 99284 EMERGENCY DEPT VISIT MOD MDM: CPT

## 2019-03-20 ASSESSMENT — PATIENT HEALTH QUESTIONNAIRE - PHQ9
SUM OF ALL RESPONSES TO PHQ QUESTIONS 1-9: 0
2. FEELING DOWN, DEPRESSED OR HOPELESS: 0
1. LITTLE INTEREST OR PLEASURE IN DOING THINGS: 0
SUM OF ALL RESPONSES TO PHQ9 QUESTIONS 1 & 2: 0
SUM OF ALL RESPONSES TO PHQ QUESTIONS 1-9: 0

## 2019-03-21 DIAGNOSIS — M47.816 OSTEOARTHRITIS OF LUMBAR SPINE, UNSPECIFIED SPINAL OSTEOARTHRITIS COMPLICATION STATUS: ICD-10-CM

## 2019-03-21 DIAGNOSIS — M25.50 ARTHRALGIA OF MULTIPLE JOINTS: ICD-10-CM

## 2019-03-21 RX ORDER — DICLOFENAC SODIUM 75 MG/1
TABLET, DELAYED RELEASE ORAL
Qty: 60 TABLET | Refills: 5 | Status: SHIPPED | OUTPATIENT
Start: 2019-03-21 | End: 2019-09-09 | Stop reason: SDUPTHER

## 2019-03-26 ENCOUNTER — OFFICE VISIT (OUTPATIENT)
Dept: PODIATRY | Age: 54
End: 2019-03-26
Payer: COMMERCIAL

## 2019-03-26 VITALS — SYSTOLIC BLOOD PRESSURE: 118 MMHG | DIASTOLIC BLOOD PRESSURE: 68 MMHG | HEART RATE: 72 BPM | RESPIRATION RATE: 20 BRPM

## 2019-03-26 DIAGNOSIS — B35.1 DERMATOPHYTOSIS OF NAIL: ICD-10-CM

## 2019-03-26 DIAGNOSIS — E11.51 CONTROLLED TYPE 2 DM WITH PERIPHERAL CIRCULATORY DISORDER (HCC): Primary | ICD-10-CM

## 2019-03-26 PROCEDURE — 11721 DEBRIDE NAIL 6 OR MORE: CPT | Performed by: PODIATRIST

## 2019-03-26 PROCEDURE — 99999 PR OFFICE/OUTPT VISIT,PROCEDURE ONLY: CPT | Performed by: PODIATRIST

## 2019-04-01 ENCOUNTER — OFFICE VISIT (OUTPATIENT)
Dept: FAMILY MEDICINE CLINIC | Age: 54
End: 2019-04-01
Payer: COMMERCIAL

## 2019-04-01 VITALS
HEIGHT: 72 IN | OXYGEN SATURATION: 98 % | SYSTOLIC BLOOD PRESSURE: 122 MMHG | HEART RATE: 94 BPM | BODY MASS INDEX: 42.12 KG/M2 | DIASTOLIC BLOOD PRESSURE: 84 MMHG | WEIGHT: 311 LBS

## 2019-04-01 DIAGNOSIS — G89.29 CHRONIC JOINT PAIN: ICD-10-CM

## 2019-04-01 DIAGNOSIS — L85.3 XEROSIS OF SKIN: ICD-10-CM

## 2019-04-01 DIAGNOSIS — K21.9 GASTROESOPHAGEAL REFLUX DISEASE, ESOPHAGITIS PRESENCE NOT SPECIFIED: ICD-10-CM

## 2019-04-01 DIAGNOSIS — M25.50 CHRONIC JOINT PAIN: ICD-10-CM

## 2019-04-01 DIAGNOSIS — H53.2 DIPLOPIA: ICD-10-CM

## 2019-04-01 DIAGNOSIS — G80.9 CEREBRAL PALSY, UNSPECIFIED TYPE (HCC): ICD-10-CM

## 2019-04-01 DIAGNOSIS — L30.9 CHRONIC DERMATITIS: ICD-10-CM

## 2019-04-01 DIAGNOSIS — E11.9 TYPE 2 DIABETES MELLITUS WITHOUT COMPLICATION, WITHOUT LONG-TERM CURRENT USE OF INSULIN (HCC): Primary | ICD-10-CM

## 2019-04-01 DIAGNOSIS — I10 ESSENTIAL HYPERTENSION: ICD-10-CM

## 2019-04-01 PROBLEM — E11.621 DIABETIC ULCER OF TOE OF LEFT FOOT ASSOCIATED WITH TYPE 2 DIABETES MELLITUS, WITH FAT LAYER EXPOSED (HCC): Status: RESOLVED | Noted: 2018-05-25 | Resolved: 2019-04-01

## 2019-04-01 PROBLEM — L97.522 DIABETIC ULCER OF TOE OF LEFT FOOT ASSOCIATED WITH TYPE 2 DIABETES MELLITUS, WITH FAT LAYER EXPOSED (HCC): Status: RESOLVED | Noted: 2018-05-25 | Resolved: 2019-04-01

## 2019-04-01 PROCEDURE — 3017F COLORECTAL CA SCREEN DOC REV: CPT | Performed by: FAMILY MEDICINE

## 2019-04-01 PROCEDURE — 3044F HG A1C LEVEL LT 7.0%: CPT | Performed by: FAMILY MEDICINE

## 2019-04-01 PROCEDURE — G8427 DOCREV CUR MEDS BY ELIG CLIN: HCPCS | Performed by: FAMILY MEDICINE

## 2019-04-01 PROCEDURE — 1036F TOBACCO NON-USER: CPT | Performed by: FAMILY MEDICINE

## 2019-04-01 PROCEDURE — 99214 OFFICE O/P EST MOD 30 MIN: CPT | Performed by: FAMILY MEDICINE

## 2019-04-01 PROCEDURE — G8417 CALC BMI ABV UP PARAM F/U: HCPCS | Performed by: FAMILY MEDICINE

## 2019-04-01 PROCEDURE — 2022F DILAT RTA XM EVC RTNOPTHY: CPT | Performed by: FAMILY MEDICINE

## 2019-04-01 ASSESSMENT — ENCOUNTER SYMPTOMS: SHORTNESS OF BREATH: 0

## 2019-04-01 NOTE — PROGRESS NOTES
his GERD symptoms well.      Taking Flexeril 10 mg nightly at bedtime for muscle spasticity; sometimes has to take one during the day as well, which he had to do 3 days last week due to increased symptoms.  Also taking Diclofenac 75 mg BID, which is working just as well as the Naproxen.     Pt using Bi-Pap machine nightly for CHAD - had started on CPAP machine, but was transitioned to Bi-Pap when he was still having issues.       Using Clotrimazole on chronic rash on sides/back once per day, depending on the area. Usually improves after 4-5 applications. Past Medical History:   Diagnosis Date    Arthritis     Carpal tunnel syndrome     Bilateral.    Cerebral palsy (HCC)     Chest pain     Atypical, history of.  Chronic arthralgias of knees and hips     Degenerative disc disease     Lumbar.  Diabetes mellitus, type 2 ([address]     Hip joint valgus deformity     Right hip secondary to muscle imbalance and cerebral palsy.  History of blood transfusion     History of kidney stones     Hypertension     Pyelonephritis 10/14/1988    Reflux     Sleep apnea     Spasticity     Severe, lower extremities, with multiple procedures to the hips and knees.  Ureteral calculi 06/04/1991    Right with obstruction. Past Surgical History:   Procedure Laterality Date    CARPAL TUNNEL RELEASE Right 09/21/2009    CHOLECYSTECTOMY  09/14/2016    laparscopic w/op grams    COLONOSCOPY  10/1/15    normal    HIP OSTEOTOMY Left 1989    HIP OSTEOTOMY Right 1983    Varus derotation.  LITHOTRIPSY Right 06/04/1991    With cystoscopy and ureteral catheter placement.  LUMBAR FUSION  June 2005    L4-L5.     OTHER SURGICAL HISTORY      pelvic surgery    TOTAL HIP ARTHROPLASTY Left 1999     Family History   Problem Relation Age of Onset    Heart Disease Mother     Heart Disease Father     Heart Attack Brother         age 46    Heart Disease Paternal Uncle      Social History     Tobacco Use    Smoking use of insulin (HCC)  SITagliptin (JANUVIA) 50 MG tablet    Hemoglobin A1C   2. Essential hypertension     3. Chronic dermatitis  Gerald Yancey MD, Dermatology, [place]   4. Xerosis of skin  Gerald Yancey MD, Dermatology, [place]   5. Diplopia     6. Gastroesophageal reflux disease, esophagitis presence not specified     7. Chronic joint pain     8. Cerebral palsy, unspecified type ([facility]           Plan:      Return in about 6 months (around 10/1/2019) for Follow-up. Orders Placed This Encounter   Procedures    Hemoglobin A1C     Standing Status:   Future     Standing Expiration Date:   4/1/2020   Gerald Yancey MD, Dermatology, [place]     Referral Priority:   Routine     Referral Type:   Eval and Treat     Referral Reason:   Specialty Services Required     Referred to Provider:   Janny Henry MD     Requested Specialty:   Dermatology     Number of Visits Requested:   1     Orders Placed This Encounter   Medications    SITagliptin (JANUVIA) 50 MG tablet     Sig: Take 1 tablet by mouth daily     Dispense:  30 tablet     Refill:  0       Patient given educational materials - see patient instructions. Discussed use, benefit, and side effects of prescribed medications. All patient questions answered. Pt voiced understanding. Reviewed health maintenance.             Electronically signed by Marco Hernandez DO on 4/7/2019 at 11:54 PM

## 2019-04-28 DIAGNOSIS — E11.9 TYPE 2 DIABETES MELLITUS WITHOUT COMPLICATION, WITHOUT LONG-TERM CURRENT USE OF INSULIN (HCC): ICD-10-CM

## 2019-04-29 RX ORDER — DULAGLUTIDE 0.75 MG/.5ML
INJECTION, SOLUTION SUBCUTANEOUS
Qty: 2 ML | Refills: 2 | Status: SHIPPED | OUTPATIENT
Start: 2019-04-29 | End: 2019-07-21 | Stop reason: SDUPTHER

## 2019-04-29 RX ORDER — SITAGLIPTIN 50 MG/1
TABLET, FILM COATED ORAL
Qty: 30 TABLET | Refills: 2 | Status: SHIPPED | OUTPATIENT
Start: 2019-04-29 | End: 2019-07-23 | Stop reason: SDUPTHER

## 2019-04-30 ENCOUNTER — OFFICE VISIT (OUTPATIENT)
Dept: FAMILY MEDICINE CLINIC | Age: 54
End: 2019-04-30
Payer: COMMERCIAL

## 2019-04-30 VITALS
BODY MASS INDEX: 42.12 KG/M2 | SYSTOLIC BLOOD PRESSURE: 126 MMHG | WEIGHT: 311 LBS | HEIGHT: 72 IN | HEART RATE: 92 BPM | DIASTOLIC BLOOD PRESSURE: 82 MMHG

## 2019-04-30 DIAGNOSIS — E11.9 TYPE 2 DIABETES MELLITUS WITHOUT COMPLICATION, WITHOUT LONG-TERM CURRENT USE OF INSULIN (HCC): ICD-10-CM

## 2019-04-30 DIAGNOSIS — G80.9 CEREBRAL PALSY, UNSPECIFIED TYPE (HCC): ICD-10-CM

## 2019-04-30 DIAGNOSIS — Z02.6 ENCOUNTER FOR EXAMINATION FOR INSURANCE PURPOSES: Primary | ICD-10-CM

## 2019-04-30 DIAGNOSIS — I10 ESSENTIAL HYPERTENSION: ICD-10-CM

## 2019-04-30 PROCEDURE — 3017F COLORECTAL CA SCREEN DOC REV: CPT | Performed by: NURSE PRACTITIONER

## 2019-04-30 PROCEDURE — 2022F DILAT RTA XM EVC RTNOPTHY: CPT | Performed by: NURSE PRACTITIONER

## 2019-04-30 PROCEDURE — 3044F HG A1C LEVEL LT 7.0%: CPT | Performed by: NURSE PRACTITIONER

## 2019-04-30 PROCEDURE — 99212 OFFICE O/P EST SF 10 MIN: CPT | Performed by: NURSE PRACTITIONER

## 2019-04-30 PROCEDURE — 1036F TOBACCO NON-USER: CPT | Performed by: NURSE PRACTITIONER

## 2019-04-30 PROCEDURE — G8427 DOCREV CUR MEDS BY ELIG CLIN: HCPCS | Performed by: NURSE PRACTITIONER

## 2019-04-30 PROCEDURE — G8417 CALC BMI ABV UP PARAM F/U: HCPCS | Performed by: NURSE PRACTITIONER

## 2019-04-30 NOTE — PROGRESS NOTES
Subjective:      Patient ID: Naomi Young is a 48 y.o. male. HPI Presents for exam and completion of form for car insurance. He denies health changes or driving restrictions. He changed insurance companies and needs a new form. Review of Systems   Constitutional:        Uses a motorized wheelchair for mobility. All other systems reviewed and are negative. Objective:   Physical Exam   Constitutional: He is oriented to person, place, and time. He appears well-developed and well-nourished. No distress. HENT:   Head: Normocephalic and atraumatic. Right Ear: External ear normal.   Left Ear: External ear normal.   Cardiovascular: Normal rate and regular rhythm. Pulmonary/Chest: Effort normal and breath sounds normal.   Neurological: He is alert and oriented to person, place, and time. Skin: Skin is warm and dry. He is not diaphoretic. Psychiatric: He has a normal mood and affect.  His behavior is normal. Judgment and thought content normal.     Assessment:      Need for car insurance form completion  Cerebral palsy  Diabetes  Hypertension      Plan:      Form completed  RV with primary care as scheduled        JADON Chance - CNP

## 2019-05-07 DIAGNOSIS — I10 ESSENTIAL HYPERTENSION: ICD-10-CM

## 2019-05-08 RX ORDER — METOPROLOL TARTRATE 50 MG/1
TABLET, FILM COATED ORAL
Qty: 180 TABLET | Refills: 0 | Status: SHIPPED | OUTPATIENT
Start: 2019-05-08 | End: 2019-08-01 | Stop reason: SDUPTHER

## 2019-06-04 ENCOUNTER — OFFICE VISIT (OUTPATIENT)
Dept: PODIATRY | Age: 54
End: 2019-06-04
Payer: COMMERCIAL

## 2019-06-04 VITALS — DIASTOLIC BLOOD PRESSURE: 68 MMHG | RESPIRATION RATE: 20 BRPM | SYSTOLIC BLOOD PRESSURE: 124 MMHG | HEART RATE: 68 BPM

## 2019-06-04 DIAGNOSIS — B35.1 DERMATOPHYTOSIS OF NAIL: ICD-10-CM

## 2019-06-04 DIAGNOSIS — E11.51 CONTROLLED TYPE 2 DM WITH PERIPHERAL CIRCULATORY DISORDER (HCC): Primary | ICD-10-CM

## 2019-06-04 PROCEDURE — 99999 PR OFFICE/OUTPT VISIT,PROCEDURE ONLY: CPT | Performed by: PODIATRIST

## 2019-06-04 PROCEDURE — 11721 DEBRIDE NAIL 6 OR MORE: CPT | Performed by: PODIATRIST

## 2019-06-04 NOTE — PROGRESS NOTES
Subjective:  Daron Pascual is a 48 y.o. male who presents to the office today for routine DM foot care. Currently denies F/C/N/V. No Known Allergies    Past Medical History:   Diagnosis Date    Arthritis     Carpal tunnel syndrome     Bilateral.    Cerebral palsy (HCC)     Chest pain     Atypical, history of.  Chronic arthralgias of knees and hips     Degenerative disc disease     Lumbar.  Diabetes mellitus, type 2 ([address]     Hip joint valgus deformity     Right hip secondary to muscle imbalance and cerebral palsy.  History of blood transfusion     History of kidney stones     Hypertension     Pyelonephritis 10/14/1988    Reflux     Sleep apnea     Spasticity     Severe, lower extremities, with multiple procedures to the hips and knees.  Ureteral calculi 06/04/1991    Right with obstruction. Prior to Admission medications    Medication Sig Start Date End Date Taking?  Authorizing Provider   metoprolol tartrate (LOPRESSOR) 50 MG tablet TAKE 1 TABLET BY MOUTH TWICE DAILY 5/8/19  Yes [facility], DO   JANUVIA 50 MG tablet TAKE 1 TABLET BY MOUTH DAILY 4/29/19  Yes [facility], DO   TRULICITY 6.73 UY/4.9CV SOPN INJECT 0.75MG INTO THE SKIN ONCE A WEEK 4/29/19  Yes [facility], DO   diclofenac (VOLTAREN) 75 MG EC tablet TAKE 1 TABLET BY MOUTH TWICE DAILY WITH FOOD 3/21/19  Yes [facility], DO   famotidine (PEPCID) 40 MG tablet TAKE 1 TABLET BY MOUTH EVERY DAY 2/12/19  Yes [facility]hell [facility], DO   metFORMIN (GLUCOPHAGE-XR) 500 MG extended release tablet TAKE 2 TABLETS BY MOUTH TWICE DAILY 2/12/19  Yes [facility]hell [facility], DO   cyclobenzaprine (FLEXERIL) 10 MG tablet TAKE 1 TABLET BY MOUTH TWICE DAILY AS NEEDED 1/29/19  Yes [facility]hell [facility], DO   clotrimazole (LOTRIMIN) 1 % cream APPLY EXTERNALLY TO THE AFFECTED AREA TWICE DAILY AS NEEDED 11/12/18  Yes [facility]hell Plum [facility], DO   lisinopril-hydrochlorothiazide (PRINZIDE;ZESTORETIC) 20-25 MG per tablet TAKE 1 TABLET BY MOUTH DAILY 8/27/18  Yes General: AAO x 3 in NAD. Vascular: DP and PT pulses palpable 2/4, bilateral.  CFT <3 seconds, bilateral.  Hair growth present to the level of the digits, bilateral.  Edema present, bilateral.  Varicosities present, bilateral. Erythema absent, bilateral. Distal Rubor absent bilateral.  Temperature within normal limits bilateral. Hyperpigmentation present bilateral. Atrophic skin yes. Neurological: Sensation Impaired to light touch to level of digits, bilateral.  Protective sensation intact  0/10 sites via 5.07/10g Alva-Aj Monofilament, bilateral.  negative Tinel's, bilateral.  negative Valleix sign, bilateral.  Vibratory abnormal  bilateral.  Reflexes Decreased bilateral.  Paresthesias positive. Dysthesias negative. Sharp/dull abnormal  bilateral.  Musculoskeletal: Muscle strength 4/5, bilateral.  Pain absent upon palpation bilateral. Normal medial longitudinal arch, bilateral.  Ankle ROM decreased,bilateral.  1st MPJ ROM within normal limits, bilateral.  Dorsally contracted digits absent. No other foot deformities. Integument:   Open lesion present distal L hallux. Interdigital maceration absent to web spaces absent, Bilateral.  Nails left 1, 2, 3, 4, 5 and right 1, 2, 3, 4, 5 thickened, dystrophic and crumbly, discolored with subungual debris. Fissures absent, Bilateral. Hyperkeratotic tissue is absent. Asessment: Patient is a 48 y.o. male with:    Diagnosis Orders   1. Controlled type 2 DM with peripheral circulatory disorder ([facility]     2. Dermatophytosis of nail         Plan: Patient examined and evaluated. Current condition and treatment options discussed in detail. DM foot ed and exam.    All nails as mentioned above debrided in length and thickness. Patient advised OTC methods for treatment of the mycotic nails. Patient will follow up in 10 weeks. Contact office with any questions/problems/concerns.

## 2019-06-04 NOTE — PROGRESS NOTES
Foot Care Worksheet  PCP: Marco Hernandez DO  Last visit: 04/01/2019    Nail description:  Thick , Yellow , Crumbly , Marked limitation of ambulation     Pain resulting from thickened and dystrophy of nail plate No    Nails involved  Right   1, 2, 3, 4, 5  (T5-T9)  Left     1, 2, 3, 4, 5  (TA-T4)    Q7 1 Class A Finding - Non traumatic amputation of foot No    Q8 2 Class B Findings - Absent DP pulse No, Absent PT pulse No, Advanced tropic changes (3 required) Yes    Decrease hair growth No, Nail changes/thickening Yes, Pigmented changes/discoloration Yes, Skin texture (thin, shiny) Yes, Skin color (rubor/redness) No    Q9 1 Class B and 2 Class C Findings  Claudication No, Temperature change Yes, Paresthesia Yes, Burning No, Edema Yes

## 2019-07-21 DIAGNOSIS — E11.9 TYPE 2 DIABETES MELLITUS WITHOUT COMPLICATION, WITHOUT LONG-TERM CURRENT USE OF INSULIN (HCC): ICD-10-CM

## 2019-07-23 DIAGNOSIS — E11.9 TYPE 2 DIABETES MELLITUS WITHOUT COMPLICATION, WITHOUT LONG-TERM CURRENT USE OF INSULIN (HCC): ICD-10-CM

## 2019-07-24 RX ORDER — DULAGLUTIDE 0.75 MG/.5ML
INJECTION, SOLUTION SUBCUTANEOUS
Qty: 6 ML | Refills: 3 | Status: SHIPPED | OUTPATIENT
Start: 2019-07-24 | End: 2020-06-22

## 2019-07-24 RX ORDER — SITAGLIPTIN 50 MG/1
TABLET, FILM COATED ORAL
Qty: 30 TABLET | Refills: 5 | Status: SHIPPED | OUTPATIENT
Start: 2019-07-24 | End: 2020-01-13

## 2019-08-01 DIAGNOSIS — I10 ESSENTIAL HYPERTENSION: ICD-10-CM

## 2019-08-02 RX ORDER — METOPROLOL TARTRATE 50 MG/1
TABLET, FILM COATED ORAL
Qty: 180 TABLET | Refills: 3 | Status: SHIPPED | OUTPATIENT
Start: 2019-08-02 | End: 2020-07-20

## 2019-08-13 ENCOUNTER — OFFICE VISIT (OUTPATIENT)
Dept: PODIATRY | Age: 54
End: 2019-08-13
Payer: COMMERCIAL

## 2019-08-13 VITALS — HEART RATE: 72 BPM | SYSTOLIC BLOOD PRESSURE: 120 MMHG | RESPIRATION RATE: 18 BRPM | DIASTOLIC BLOOD PRESSURE: 72 MMHG

## 2019-08-13 DIAGNOSIS — B35.1 DERMATOPHYTOSIS OF NAIL: ICD-10-CM

## 2019-08-13 DIAGNOSIS — E11.51 CONTROLLED TYPE 2 DM WITH PERIPHERAL CIRCULATORY DISORDER (HCC): Primary | ICD-10-CM

## 2019-08-13 PROCEDURE — 11721 DEBRIDE NAIL 6 OR MORE: CPT | Performed by: PODIATRIST

## 2019-08-13 PROCEDURE — 99999 PR OFFICE/OUTPT VISIT,PROCEDURE ONLY: CPT | Performed by: PODIATRIST

## 2019-09-09 DIAGNOSIS — M47.816 OSTEOARTHRITIS OF LUMBAR SPINE, UNSPECIFIED SPINAL OSTEOARTHRITIS COMPLICATION STATUS: ICD-10-CM

## 2019-09-09 DIAGNOSIS — M25.50 ARTHRALGIA OF MULTIPLE JOINTS: ICD-10-CM

## 2019-09-09 DIAGNOSIS — I10 ESSENTIAL HYPERTENSION: ICD-10-CM

## 2019-09-12 RX ORDER — LISINOPRIL AND HYDROCHLOROTHIAZIDE 25; 20 MG/1; MG/1
1 TABLET ORAL DAILY
Qty: 90 TABLET | Refills: 3 | Status: SHIPPED | OUTPATIENT
Start: 2019-09-12 | End: 2020-09-01

## 2019-09-12 RX ORDER — DICLOFENAC SODIUM 75 MG/1
TABLET, DELAYED RELEASE ORAL
Qty: 180 TABLET | Refills: 3 | Status: SHIPPED | OUTPATIENT
Start: 2019-09-12 | End: 2020-09-01

## 2019-09-20 ENCOUNTER — HOSPITAL ENCOUNTER (OUTPATIENT)
Dept: LAB | Age: 54
Discharge: HOME | End: 2019-09-20
Payer: COMMERCIAL

## 2019-09-20 DIAGNOSIS — E11.9 TYPE 2 DIABETES MELLITUS WITHOUT COMPLICATION, WITHOUT LONG-TERM CURRENT USE OF INSULIN (HCC): ICD-10-CM

## 2019-09-20 LAB
ESTIMATED AVERAGE GLUCOSE: 126 MG/DL
HBA1C MFR BLD: 6 % (ref 4.8–5.9)

## 2019-09-20 PROCEDURE — 83036 HEMOGLOBIN GLYCOSYLATED A1C: CPT

## 2019-09-20 PROCEDURE — 36415 COLL VENOUS BLD VENIPUNCTURE: CPT

## 2019-10-01 ENCOUNTER — OFFICE VISIT (OUTPATIENT)
Dept: FAMILY MEDICINE CLINIC | Age: 54
End: 2019-10-01
Payer: COMMERCIAL

## 2019-10-01 ENCOUNTER — HOSPITAL ENCOUNTER (OUTPATIENT)
Dept: LAB | Age: 54
Discharge: HOME | End: 2019-10-01
Payer: COMMERCIAL

## 2019-10-01 VITALS
WEIGHT: 300 LBS | OXYGEN SATURATION: 96 % | SYSTOLIC BLOOD PRESSURE: 112 MMHG | BODY MASS INDEX: 40.63 KG/M2 | HEART RATE: 92 BPM | DIASTOLIC BLOOD PRESSURE: 74 MMHG | HEIGHT: 72 IN

## 2019-10-01 DIAGNOSIS — E78.2 MIXED HYPERLIPIDEMIA: ICD-10-CM

## 2019-10-01 DIAGNOSIS — G80.9 CEREBRAL PALSY, UNSPECIFIED TYPE (HCC): ICD-10-CM

## 2019-10-01 DIAGNOSIS — K21.9 GASTROESOPHAGEAL REFLUX DISEASE, ESOPHAGITIS PRESENCE NOT SPECIFIED: ICD-10-CM

## 2019-10-01 DIAGNOSIS — E11.9 TYPE 2 DIABETES MELLITUS WITHOUT COMPLICATION, WITHOUT LONG-TERM CURRENT USE OF INSULIN (HCC): Primary | ICD-10-CM

## 2019-10-01 DIAGNOSIS — I10 ESSENTIAL HYPERTENSION: ICD-10-CM

## 2019-10-01 DIAGNOSIS — E11.9 TYPE 2 DIABETES MELLITUS WITHOUT COMPLICATION, WITHOUT LONG-TERM CURRENT USE OF INSULIN (HCC): ICD-10-CM

## 2019-10-01 DIAGNOSIS — M62.838 MUSCLE SPASTICITY: ICD-10-CM

## 2019-10-01 LAB
ALBUMIN SERPL-MCNC: 4.4 G/DL (ref 3.5–5.2)
ALBUMIN/GLOBULIN RATIO: 1.6 (ref 1–2.5)
ALP BLD-CCNC: 58 U/L (ref 40–129)
ALT SERPL-CCNC: 61 U/L (ref 5–41)
ANION GAP SERPL CALCULATED.3IONS-SCNC: 14 MMOL/L (ref 9–17)
AST SERPL-CCNC: 36 U/L
BILIRUB SERPL-MCNC: 0.35 MG/DL (ref 0.3–1.2)
BUN BLDV-MCNC: 10 MG/DL (ref 6–20)
BUN/CREAT BLD: 13 (ref 9–20)
CALCIUM SERPL-MCNC: 9.9 MG/DL (ref 8.6–10.4)
CHLORIDE BLD-SCNC: 99 MMOL/L (ref 98–107)
CHOLESTEROL/HDL RATIO: 6.9
CHOLESTEROL: 165 MG/DL
CO2: 26 MMOL/L (ref 20–31)
CREAT SERPL-MCNC: 0.76 MG/DL (ref 0.7–1.2)
CREATININE URINE: 227.3 MG/DL (ref 39–259)
GFR AFRICAN AMERICAN: >60 ML/MIN
GFR NON-AFRICAN AMERICAN: >60 ML/MIN
GFR SERPL CREATININE-BSD FRML MDRD: ABNORMAL ML/MIN/{1.73_M2}
GFR SERPL CREATININE-BSD FRML MDRD: ABNORMAL ML/MIN/{1.73_M2}
GLUCOSE BLD-MCNC: 173 MG/DL (ref 70–99)
HDLC SERPL-MCNC: 24 MG/DL
LDL CHOLESTEROL: 62 MG/DL (ref 0–130)
MICROALBUMIN/CREAT 24H UR: 90 MG/L
MICROALBUMIN/CREAT UR-RTO: 40 MCG/MG CREAT
POTASSIUM SERPL-SCNC: 4.3 MMOL/L (ref 3.7–5.3)
SODIUM BLD-SCNC: 139 MMOL/L (ref 135–144)
TOTAL PROTEIN: 7.2 G/DL (ref 6.4–8.3)
TRIGL SERPL-MCNC: 397 MG/DL
VLDLC SERPL CALC-MCNC: ABNORMAL MG/DL (ref 1–30)

## 2019-10-01 PROCEDURE — 1036F TOBACCO NON-USER: CPT | Performed by: FAMILY MEDICINE

## 2019-10-01 PROCEDURE — 36415 COLL VENOUS BLD VENIPUNCTURE: CPT

## 2019-10-01 PROCEDURE — 3017F COLORECTAL CA SCREEN DOC REV: CPT | Performed by: FAMILY MEDICINE

## 2019-10-01 PROCEDURE — G8427 DOCREV CUR MEDS BY ELIG CLIN: HCPCS | Performed by: FAMILY MEDICINE

## 2019-10-01 PROCEDURE — 99214 OFFICE O/P EST MOD 30 MIN: CPT | Performed by: FAMILY MEDICINE

## 2019-10-01 PROCEDURE — 82570 ASSAY OF URINE CREATININE: CPT

## 2019-10-01 PROCEDURE — G8417 CALC BMI ABV UP PARAM F/U: HCPCS | Performed by: FAMILY MEDICINE

## 2019-10-01 PROCEDURE — 82043 UR ALBUMIN QUANTITATIVE: CPT

## 2019-10-01 PROCEDURE — 2022F DILAT RTA XM EVC RTNOPTHY: CPT | Performed by: FAMILY MEDICINE

## 2019-10-01 PROCEDURE — 80061 LIPID PANEL: CPT

## 2019-10-01 PROCEDURE — 80053 COMPREHEN METABOLIC PANEL: CPT

## 2019-10-01 PROCEDURE — G8484 FLU IMMUNIZE NO ADMIN: HCPCS | Performed by: FAMILY MEDICINE

## 2019-10-01 PROCEDURE — 3044F HG A1C LEVEL LT 7.0%: CPT | Performed by: FAMILY MEDICINE

## 2019-10-01 ASSESSMENT — ENCOUNTER SYMPTOMS
BLOOD IN STOOL: 0
SHORTNESS OF BREATH: 0

## 2019-10-22 ENCOUNTER — TELEPHONE (OUTPATIENT)
Dept: FAMILY MEDICINE CLINIC | Age: 54
End: 2019-10-22

## 2019-10-22 ENCOUNTER — OFFICE VISIT (OUTPATIENT)
Dept: PODIATRY | Age: 54
End: 2019-10-22
Payer: MEDICARE

## 2019-10-22 VITALS
HEIGHT: 72 IN | BODY MASS INDEX: 40.69 KG/M2 | DIASTOLIC BLOOD PRESSURE: 74 MMHG | SYSTOLIC BLOOD PRESSURE: 122 MMHG | HEART RATE: 76 BPM

## 2019-10-22 DIAGNOSIS — E11.51 CONTROLLED TYPE 2 DM WITH PERIPHERAL CIRCULATORY DISORDER (HCC): Primary | ICD-10-CM

## 2019-10-22 DIAGNOSIS — B35.1 DERMATOPHYTOSIS OF NAIL: ICD-10-CM

## 2019-10-22 PROCEDURE — 99999 PR OFFICE/OUTPT VISIT,PROCEDURE ONLY: CPT | Performed by: PODIATRIST

## 2019-10-22 PROCEDURE — 11721 DEBRIDE NAIL 6 OR MORE: CPT | Performed by: PODIATRIST

## 2019-10-25 NOTE — TELEPHONE ENCOUNTER
Called [facility] in [facility] - they said even though pt lives in [place] & is close to them, they have certain service areas & his order would have to be faxed to the [facility] office at [phone]. Must include order, chart notes & demographic info.

## 2019-12-04 ENCOUNTER — TELEPHONE (OUTPATIENT)
Dept: FAMILY MEDICINE CLINIC | Age: 54
End: 2019-12-04

## 2019-12-04 DIAGNOSIS — R30.0 DYSURIA: Primary | ICD-10-CM

## 2019-12-06 ENCOUNTER — HOSPITAL ENCOUNTER (OUTPATIENT)
Dept: LAB | Age: 54
Discharge: HOME | End: 2019-12-06
Payer: COMMERCIAL

## 2019-12-06 DIAGNOSIS — R30.0 DYSURIA: ICD-10-CM

## 2019-12-06 LAB
-: ABNORMAL
AMORPHOUS: ABNORMAL
BACTERIA: ABNORMAL
BILIRUBIN URINE: NEGATIVE
CASTS UA: ABNORMAL /LPF (ref 0–2)
COLOR: ABNORMAL
COMMENT UA: ABNORMAL
CRYSTALS, UA: ABNORMAL /HPF
EPITHELIAL CELLS UA: ABNORMAL /HPF (ref 0–5)
GLUCOSE URINE: ABNORMAL
KETONES, URINE: ABNORMAL
LEUKOCYTE ESTERASE, URINE: ABNORMAL
MUCUS: ABNORMAL
NITRITE, URINE: POSITIVE
OTHER OBSERVATIONS UA: ABNORMAL
PH UA: 6 (ref 5–6)
PROTEIN UA: ABNORMAL
RBC UA: ABNORMAL /HPF (ref 0–4)
RENAL EPITHELIAL, UA: ABNORMAL /HPF
SPECIFIC GRAVITY UA: 1.02 (ref 1.01–1.02)
TRICHOMONAS: ABNORMAL
TURBIDITY: ABNORMAL
URINE HGB: ABNORMAL
UROBILINOGEN, URINE: NORMAL
WBC UA: >100 /HPF (ref 0–4)
YEAST: ABNORMAL

## 2019-12-06 PROCEDURE — 87186 SC STD MICRODIL/AGAR DIL: CPT

## 2019-12-06 PROCEDURE — 81001 URINALYSIS AUTO W/SCOPE: CPT

## 2019-12-06 PROCEDURE — 87086 URINE CULTURE/COLONY COUNT: CPT

## 2019-12-06 PROCEDURE — 87088 URINE BACTERIA CULTURE: CPT

## 2019-12-07 LAB
CULTURE: ABNORMAL
Lab: ABNORMAL
SPECIMEN DESCRIPTION: ABNORMAL

## 2019-12-31 ENCOUNTER — OFFICE VISIT (OUTPATIENT)
Dept: PODIATRY | Age: 54
End: 2019-12-31
Payer: MEDICARE

## 2019-12-31 VITALS
HEIGHT: 72 IN | HEART RATE: 72 BPM | SYSTOLIC BLOOD PRESSURE: 132 MMHG | BODY MASS INDEX: 40.69 KG/M2 | DIASTOLIC BLOOD PRESSURE: 78 MMHG

## 2019-12-31 DIAGNOSIS — E11.51 CONTROLLED TYPE 2 DM WITH PERIPHERAL CIRCULATORY DISORDER (HCC): Primary | ICD-10-CM

## 2019-12-31 DIAGNOSIS — B35.1 DERMATOPHYTOSIS OF NAIL: ICD-10-CM

## 2019-12-31 PROCEDURE — 11721 DEBRIDE NAIL 6 OR MORE: CPT | Performed by: PODIATRIST

## 2020-01-14 RX ORDER — SITAGLIPTIN 50 MG/1
TABLET, FILM COATED ORAL
Qty: 30 TABLET | Refills: 11 | Status: SHIPPED | OUTPATIENT
Start: 2020-01-14 | End: 2021-02-01 | Stop reason: SDUPTHER

## 2020-02-01 RX ORDER — METFORMIN HYDROCHLORIDE 500 MG/1
TABLET, EXTENDED RELEASE ORAL
Qty: 120 TABLET | Refills: 11 | Status: SHIPPED | OUTPATIENT
Start: 2020-02-01 | End: 2021-01-20

## 2020-02-04 RX ORDER — CYCLOBENZAPRINE HCL 10 MG
TABLET ORAL
Qty: 60 TABLET | Refills: 11 | Status: SHIPPED | OUTPATIENT
Start: 2020-02-04 | End: 2021-02-19 | Stop reason: SDUPTHER

## 2020-02-04 RX ORDER — FAMOTIDINE 40 MG/1
TABLET, FILM COATED ORAL
Qty: 30 TABLET | Refills: 11 | Status: SHIPPED | OUTPATIENT
Start: 2020-02-04 | End: 2020-12-21

## 2020-03-10 ENCOUNTER — OFFICE VISIT (OUTPATIENT)
Dept: PODIATRY | Age: 55
End: 2020-03-10
Payer: COMMERCIAL

## 2020-03-10 VITALS — DIASTOLIC BLOOD PRESSURE: 70 MMHG | HEART RATE: 80 BPM | SYSTOLIC BLOOD PRESSURE: 120 MMHG | RESPIRATION RATE: 20 BRPM

## 2020-03-10 PROCEDURE — 11721 DEBRIDE NAIL 6 OR MORE: CPT | Performed by: PODIATRIST

## 2020-03-10 NOTE — PROGRESS NOTES
Foot Care Worksheet  PCP: Carol Nina DO  Last visit: 10/1/19    Nail description:  Thick , Yellow , Crumbly , Marked limitation of ambulation     Pain resulting from thickened and dystrophy of nail plate No    Nails involved  Right   1, 2, 3, 4, 5  (T5-T9)  Left     1, 2, 3, 4, 5  (TA-T4)    Q7 1 Class A Finding - Non traumatic amputation of foot No    Q8 2 Class B Findings - Absent DP pulse No, Absent PT pulse No, Advanced tropic changes (3 required) Yes    Decrease hair growth No, Nail changes/thickening Yes, Pigmented changes/discoloration Yes, Skin texture (thin, shiny) Yes, Skin color (rubor/redness) No    Q9 1 Class B and 2 Class C Findings  Claudication No, Temperature change Yes, Paresthesia Yes, Burning No, Edema Yes
bilateral. Erythema absent, bilateral. Distal Rubor absent bilateral.  Temperature within normal limits bilateral. Hyperpigmentation present bilateral. Atrophic skin yes. Neurological: Sensation Impaired to light touch to level of digits, bilateral.  Protective sensation intact  0/10 sites via 5.07/10g Kingsley-Aj Monofilament, bilateral.  negative Tinel's, bilateral.  negative Valleix sign, bilateral.  Vibratory abnormal  bilateral.  Reflexes Decreased bilateral.  Paresthesias positive. Dysthesias negative. Sharp/dull abnormal  bilateral.  Musculoskeletal: Muscle strength 4/5, bilateral.  Pain absent upon palpation bilateral. Normal medial longitudinal arch, bilateral.  Ankle ROM decreased,bilateral.  1st MPJ ROM within normal limits, bilateral.  Dorsally contracted digits absent. No other foot deformities. Integument:   Open lesion present distal L hallux. Interdigital maceration absent to web spaces absent, Bilateral.  Nails left 1, 2, 3, 4, 5 and right 1, 2, 3, 4, 5 thickened, dystrophic and crumbly, discolored with subungual debris. Fissures absent, Bilateral. Hyperkeratotic tissue is absent. Asessment: Patient is a 47 y.o. male with:    Diagnosis Orders   1. Controlled type 2 DM with peripheral circulatory disorder ([facility]     2. Dermatophytosis of nail         Plan: Patient examined and evaluated. Current condition and treatment options discussed in detail. DM foot ed and exam.    All nails as mentioned above debrided in length and thickness. Patient advised OTC methods for treatment of the mycotic nails. Patient will follow up in 10 weeks. Contact office with any questions/problems/concerns.

## 2020-04-03 ENCOUNTER — VIRTUAL VISIT (OUTPATIENT)
Dept: FAMILY MEDICINE CLINIC | Age: 55
End: 2020-04-03
Payer: COMMERCIAL

## 2020-04-03 VITALS
SYSTOLIC BLOOD PRESSURE: 130 MMHG | HEART RATE: 84 BPM | BODY MASS INDEX: 40.63 KG/M2 | HEIGHT: 72 IN | DIASTOLIC BLOOD PRESSURE: 89 MMHG | WEIGHT: 300 LBS

## 2020-04-03 PROCEDURE — 3046F HEMOGLOBIN A1C LEVEL >9.0%: CPT | Performed by: FAMILY MEDICINE

## 2020-04-03 PROCEDURE — 99214 OFFICE O/P EST MOD 30 MIN: CPT | Performed by: FAMILY MEDICINE

## 2020-04-03 PROCEDURE — G8427 DOCREV CUR MEDS BY ELIG CLIN: HCPCS | Performed by: FAMILY MEDICINE

## 2020-04-03 PROCEDURE — 3017F COLORECTAL CA SCREEN DOC REV: CPT | Performed by: FAMILY MEDICINE

## 2020-04-03 PROCEDURE — 2022F DILAT RTA XM EVC RTNOPTHY: CPT | Performed by: FAMILY MEDICINE

## 2020-04-03 RX ORDER — PREDNISONE 20 MG/1
TABLET ORAL
Qty: 16 TABLET | Refills: 0 | Status: SHIPPED | OUTPATIENT
Start: 2020-04-03 | End: 2020-09-01

## 2020-04-03 ASSESSMENT — PATIENT HEALTH QUESTIONNAIRE - PHQ9
1. LITTLE INTEREST OR PLEASURE IN DOING THINGS: 0
SUM OF ALL RESPONSES TO PHQ QUESTIONS 1-9: 0
SUM OF ALL RESPONSES TO PHQ9 QUESTIONS 1 & 2: 0
SUM OF ALL RESPONSES TO PHQ QUESTIONS 1-9: 0
2. FEELING DOWN, DEPRESSED OR HOPELESS: 0

## 2020-04-03 ASSESSMENT — ENCOUNTER SYMPTOMS
COUGH: 0
SHORTNESS OF BREATH: 0
SORE THROAT: 0

## 2020-05-19 ENCOUNTER — OFFICE VISIT (OUTPATIENT)
Dept: PODIATRY | Age: 55
End: 2020-05-19
Payer: COMMERCIAL

## 2020-05-19 VITALS — RESPIRATION RATE: 20 BRPM | DIASTOLIC BLOOD PRESSURE: 80 MMHG | HEART RATE: 76 BPM | SYSTOLIC BLOOD PRESSURE: 128 MMHG

## 2020-05-19 PROCEDURE — 11721 DEBRIDE NAIL 6 OR MORE: CPT | Performed by: PODIATRIST

## 2020-06-02 ENCOUNTER — TELEPHONE (OUTPATIENT)
Dept: FAMILY MEDICINE CLINIC | Age: 55
End: 2020-06-02

## 2020-06-22 RX ORDER — DULAGLUTIDE 0.75 MG/.5ML
INJECTION, SOLUTION SUBCUTANEOUS
Qty: 6 ML | Refills: 3 | Status: SHIPPED | OUTPATIENT
Start: 2020-06-22 | End: 2021-06-29

## 2020-07-20 RX ORDER — METOPROLOL TARTRATE 50 MG/1
TABLET, FILM COATED ORAL
Qty: 180 TABLET | Refills: 3 | Status: SHIPPED | OUTPATIENT
Start: 2020-07-20 | End: 2021-06-22

## 2020-07-28 ENCOUNTER — HOSPITAL ENCOUNTER (OUTPATIENT)
Dept: LAB | Age: 55
Discharge: HOME | End: 2020-07-28
Payer: COMMERCIAL

## 2020-07-28 ENCOUNTER — OFFICE VISIT (OUTPATIENT)
Dept: PODIATRY | Age: 55
End: 2020-07-28
Payer: COMMERCIAL

## 2020-07-28 VITALS — HEART RATE: 80 BPM | DIASTOLIC BLOOD PRESSURE: 72 MMHG | SYSTOLIC BLOOD PRESSURE: 120 MMHG | RESPIRATION RATE: 20 BRPM

## 2020-07-28 LAB
ALBUMIN SERPL-MCNC: 4.1 G/DL (ref 3.5–5.2)
ALBUMIN/GLOBULIN RATIO: 1.5 (ref 1–2.5)
ALP BLD-CCNC: 74 U/L (ref 40–129)
ALT SERPL-CCNC: 65 U/L (ref 5–41)
AST SERPL-CCNC: 53 U/L
BILIRUB SERPL-MCNC: 0.6 MG/DL (ref 0.3–1.2)
BILIRUBIN DIRECT: 0.21 MG/DL
BILIRUBIN, INDIRECT: 0.39 MG/DL (ref 0–1)
CHOLESTEROL/HDL RATIO: 7.4
CHOLESTEROL: 162 MG/DL
ESTIMATED AVERAGE GLUCOSE: 186 MG/DL
GLOBULIN: 2.7 G/DL (ref 1.5–3.8)
HBA1C MFR BLD: 8.1 % (ref 4.8–5.9)
HDLC SERPL-MCNC: 22 MG/DL
LDL CHOLESTEROL DIRECT: 69 MG/DL
LDL CHOLESTEROL: ABNORMAL MG/DL (ref 0–130)
TOTAL PROTEIN: 6.8 G/DL (ref 6.4–8.3)
TRIGL SERPL-MCNC: 583 MG/DL
VLDLC SERPL CALC-MCNC: ABNORMAL MG/DL (ref 1–30)

## 2020-07-28 PROCEDURE — 83721 ASSAY OF BLOOD LIPOPROTEIN: CPT

## 2020-07-28 PROCEDURE — 83036 HEMOGLOBIN GLYCOSYLATED A1C: CPT

## 2020-07-28 PROCEDURE — 11721 DEBRIDE NAIL 6 OR MORE: CPT | Performed by: PODIATRIST

## 2020-07-28 PROCEDURE — 80076 HEPATIC FUNCTION PANEL: CPT

## 2020-07-28 PROCEDURE — 80061 LIPID PANEL: CPT

## 2020-07-28 PROCEDURE — 36415 COLL VENOUS BLD VENIPUNCTURE: CPT

## 2020-07-28 NOTE — PROGRESS NOTES
Foot Care Worksheet  PCP: Wong Jasmine DO  Last visit: 04/03/2020    Nail description:  Thick , Yellow , Crumbly , Marked limitation of ambulation     Pain resulting from thickened and dystrophy of nail plate No    Nails involved  Right   1, 2, 3, 4, 5  (T5-T9)  Left     1, 2, 3, 4, 5  (TA-T4)    Q7 1 Class A Finding - Non traumatic amputation of foot No    Q8 2 Class B Findings - Absent DP pulse No, Absent PT pulse No, Advanced tropic changes (3 required) Yes    Decrease hair growth No, Nail changes/thickening Yes, Pigmented changes/discoloration Yes, Skin texture (thin, shiny) Yes, Skin color (rubor/redness) No    Q9 1 Class B and 2 Class C Findings  Claudication No, Temperature change Yes, Paresthesia Yes, Burning No, Edema Yes

## 2020-07-28 NOTE — PROGRESS NOTES
Subjective:  Adri Montes De Oca is a 47 y.o. male who presents to the office today for routine DM foot care. Currently denies F/C/N/V. No Known Allergies    Past Medical History:   Diagnosis Date    Arthritis     Carpal tunnel syndrome     Bilateral.    Cerebral palsy (HCC)     Chest pain     Atypical, history of.  Chronic arthralgias of knees and hips     Degenerative disc disease     Lumbar.  Diabetes mellitus, type 2 ([address]     Hip joint valgus deformity     Right hip secondary to muscle imbalance and cerebral palsy.  History of blood transfusion     History of kidney stones     Hypertension     Pyelonephritis 10/14/1988    Reflux     Sleep apnea     Spasticity     Severe, lower extremities, with multiple procedures to the hips and knees.  Ureteral calculi 06/04/1991    Right with obstruction. Prior to Admission medications    Medication Sig Start Date End Date Taking? Authorizing Provider   metoprolol tartrate (LOPRESSOR) 50 MG tablet TAKE 1 TABLET BY MOUTH TWICE DAILY 7/20/20  Yes Kiran Thakkar, DO   TRULICITY 6.05 RU/7.4JA SOPN INJECT 0.75MG INTO THE SKIN ONCE A WEEK 6/22/20  Yes Gisel Dwoney, DO   Lift Chair [address] [facility] by Does not apply route 6/15/20  Yes Kiran Thakkar, DO   predniSONE (DELTASONE) 20 MG tablet Take 2 tabs by mouth daily x 6 days, then 1 tab daily x 4 days.  4/3/20  Yes Kiran Thakkar, DO   cyclobenzaprine (FLEXERIL) 10 MG tablet TAKE 1 TABLET BY MOUTH TWICE DAILY AS NEEDED 2/4/20  Yes Kiran Thakkar, DO   famotidine (PEPCID) 40 MG tablet TAKE 1 TABLET BY MOUTH EVERY DAY 2/4/20  Yes Kiran Thakkar, DO   metFORMIN (GLUCOPHAGE-XR) 500 MG extended release tablet TAKE 2 TABLETS BY MOUTH TWICE DAILY 2/1/20  Yes Kiran Thakkar, DO   JANUVIA 50 MG tablet TAKE 1 TABLET BY MOUTH DAILY 1/14/20  Yes Kiran Thakkar, DO   lisinopril-hydrochlorothiazide (PRINZIDE;ZESTORETIC) 20-25 MG per tablet TAKE 1 TABLET BY MOUTH DAILY 9/12/19  Yes Kiran Thakkar, DO   diclofenac (VOLTAREN) 75 MG EC tablet TAKE 1 TABLET BY MOUTH TWICE DAILY WITH FOOD 19  Yes Tom Thakkar, DO   ONETOUCH DELICA LANCETS FINE MISC TEST THREE TIMES WEEKLY AS DIRECTED 3/23/18  Yes Tom Thakkar, DO   Blood Glucose Monitoring Suppl (ONE TOUCH ULTRA 2) w/Device KIT USE AS DIRECTED 18  Yes Historical Provider, MD   Glucose Blood (BLOOD GLUCOSE TEST STRIPS) STRP TEST THREE TIMES WEEKLY AS DIRECTED. Please fill per insurance coverage. 18  Yes Tom Thakkar, DO   aspirin 81 MG tablet Take 81 mg by mouth daily. Yes Historical Provider, MD   omeprazole (PRILOSEC) 20 MG capsule Take 20 mg by mouth daily. Yes Historical Provider, MD       Past Surgical History:   Procedure Laterality Date    CARPAL TUNNEL RELEASE Right 2009    CHOLECYSTECTOMY  2016    laparscopic w/op grams    COLONOSCOPY  10/1/15    normal    HIP OSTEOTOMY Left     HIP OSTEOTOMY Right     Varus derotation.  LITHOTRIPSY Right 1991    With cystoscopy and ureteral catheter placement.  LUMBAR FUSION  2005    L4-L5.  OTHER SURGICAL HISTORY      pelvic surgery    TOTAL HIP ARTHROPLASTY Left        Family History   Problem Relation Age of Onset    Heart Disease Mother     Heart Disease Father     Heart Attack Brother         age 46    Heart Disease Paternal Uncle        Social History     Tobacco Use    Smoking status: Former Smoker     Types: Cigarettes, Cigars     Last attempt to quit: 10/9/1999     Years since quittin.8    Smokeless tobacco: Never Used    Tobacco comment: quit smoking 10/9/1999. Sol Boxer RCP, 2016. Substance Use Topics    Alcohol use: Yes     Types: 12 Standard drinks or equivalent per week     Comment: during summer. very occasional during the wintwr       Review of Systems: All 12 systems reviewed and pertinent positives noted above.     Lower Extremity Physical Examination:     Vitals:   Vitals:    20 0800   BP: 120/72   Pulse: 80   Resp: 20     General: AAO x 3 in NAD. Vascular: DP and PT pulses palpable 2/4, bilateral.  CFT <3 seconds, bilateral.  Hair growth present to the level of the digits, bilateral.  Edema present, bilateral.  Varicosities present, bilateral. Erythema absent, bilateral. Distal Rubor absent bilateral.  Temperature within normal limits bilateral. Hyperpigmentation present bilateral. Atrophic skin yes. Neurological: Sensation Impaired to light touch to level of digits, bilateral.  Protective sensation intact  0/10 sites via 5.07/10g [place]-Aj Monofilament, bilateral.  negative Tinel's, bilateral.  negative Valleix sign, bilateral.  Vibratory abnormal  bilateral.  Reflexes Decreased bilateral.  Paresthesias positive. Dysthesias negative. Sharp/dull abnormal  bilateral.  Musculoskeletal: Muscle strength 4/5, bilateral.  Pain absent upon palpation bilateral. Normal medial longitudinal arch, bilateral.  Ankle ROM decreased,bilateral.  1st MPJ ROM within normal limits, bilateral.  Dorsally contracted digits absent. No other foot deformities. Integument:   Open lesion present distal L hallux. Interdigital maceration absent to web spaces absent, Bilateral.  Nails left 1, 2, 3, 4, 5 and right 1, 2, 3, 4, 5 thickened, dystrophic and crumbly, discolored with subungual debris. Fissures absent, Bilateral. Hyperkeratotic tissue is absent. Asessment: Patient is a 47 y.o. male with:    Diagnosis Orders   1. Controlled type 2 DM with peripheral circulatory disorder ([facility] [address]     2. Dermatophytosis of nail         Plan: Patient examined and evaluated. Current condition and treatment options discussed in detail. DM foot ed and exam.    All nails as mentioned above debrided in length and thickness. Patient advised OTC methods for treatment of the mycotic nails. Patient will follow up in 10 weeks. Contact office with any questions/problems/concerns.

## 2020-08-26 NOTE — TELEPHONE ENCOUNTER
Pt has appt in 6 days - please confirm that he has enough to last until then, and if so, will refill at his OV.

## 2020-09-01 ENCOUNTER — OFFICE VISIT (OUTPATIENT)
Dept: FAMILY MEDICINE CLINIC | Age: 55
End: 2020-09-01
Payer: COMMERCIAL

## 2020-09-01 VITALS
WEIGHT: 295 LBS | OXYGEN SATURATION: 97 % | BODY MASS INDEX: 39.96 KG/M2 | DIASTOLIC BLOOD PRESSURE: 80 MMHG | HEART RATE: 88 BPM | SYSTOLIC BLOOD PRESSURE: 122 MMHG | HEIGHT: 72 IN | TEMPERATURE: 97.4 F

## 2020-09-01 PROCEDURE — G8417 CALC BMI ABV UP PARAM F/U: HCPCS | Performed by: FAMILY MEDICINE

## 2020-09-01 PROCEDURE — 2022F DILAT RTA XM EVC RTNOPTHY: CPT | Performed by: FAMILY MEDICINE

## 2020-09-01 PROCEDURE — 3017F COLORECTAL CA SCREEN DOC REV: CPT | Performed by: FAMILY MEDICINE

## 2020-09-01 PROCEDURE — 3052F HG A1C>EQUAL 8.0%<EQUAL 9.0%: CPT | Performed by: FAMILY MEDICINE

## 2020-09-01 PROCEDURE — 1036F TOBACCO NON-USER: CPT | Performed by: FAMILY MEDICINE

## 2020-09-01 PROCEDURE — G8427 DOCREV CUR MEDS BY ELIG CLIN: HCPCS | Performed by: FAMILY MEDICINE

## 2020-09-01 PROCEDURE — 99214 OFFICE O/P EST MOD 30 MIN: CPT | Performed by: FAMILY MEDICINE

## 2020-09-01 RX ORDER — LISINOPRIL AND HYDROCHLOROTHIAZIDE 25; 20 MG/1; MG/1
1 TABLET ORAL DAILY
Qty: 90 TABLET | Refills: 3 | Status: SHIPPED | OUTPATIENT
Start: 2020-09-01 | End: 2021-08-21

## 2020-09-01 RX ORDER — FENOFIBRATE 160 MG/1
160 TABLET ORAL DAILY
Qty: 90 TABLET | Refills: 1 | Status: SHIPPED | OUTPATIENT
Start: 2020-09-01 | End: 2020-11-02 | Stop reason: SINTOL

## 2020-09-01 RX ORDER — BLOOD-GLUCOSE METER
1 EACH MISCELLANEOUS DAILY
Qty: 1 KIT | Refills: 0 | Status: SHIPPED | OUTPATIENT
Start: 2020-09-01 | End: 2021-03-09 | Stop reason: SDUPTHER

## 2020-09-01 RX ORDER — DICLOFENAC SODIUM 75 MG/1
TABLET, DELAYED RELEASE ORAL
Qty: 180 TABLET | Refills: 3 | Status: SHIPPED | OUTPATIENT
Start: 2020-09-01 | End: 2021-08-21

## 2020-09-01 ASSESSMENT — PATIENT HEALTH QUESTIONNAIRE - PHQ9
1. LITTLE INTEREST OR PLEASURE IN DOING THINGS: 0
2. FEELING DOWN, DEPRESSED OR HOPELESS: 0
SUM OF ALL RESPONSES TO PHQ9 QUESTIONS 1 & 2: 0
SUM OF ALL RESPONSES TO PHQ QUESTIONS 1-9: 0
SUM OF ALL RESPONSES TO PHQ QUESTIONS 1-9: 0

## 2020-09-01 NOTE — PROGRESS NOTES
[facility] [address]  [address], [address] [place]  [phone]      Susy Meeks is a 54 y.o. male who presents today for his medical conditions/complaints as noted below. Susy Reason is c/o of Other (Face to face to get a new wheel chair - it has been atleast 5 years since he has had a new chair-); Medication Refill (pended meds elsewhere); and Discuss Labs (drawn 7/28/20)      HPI:     Pt here today for face-to-face evaluation for new power wheelchair. Has had current wheelchair for at least 5 years, and he purchased it used (feels it is likely 11-7 years old). Pt has required use of a wheelchair for the past 32 years due to his cerebral palsy. Diagnosed with cerebral palsy at the age 5 after having an abnormal gait and frequent falls. Used crutches in his youth, and then got a wheelchair. Had a manual wheelchair previously, but he can no longer manually propel the wheelchair with b/l upper extremities due to pain and weakness of b/l shoulders. Has had a power wheelchair for the past 10 years effectively and safely. Prior to getting his power wheelchair, he still had frequent falls. Pt can stand up for very brief periods, but uses the chair otherwise completely - helps to have the power chair to sit up higher, so he doesn't always have to try to stand. Uses the chair both in and out of the home. Has no questions about a new wheelchair. Current wheelchair is rusting under the seat support, which is felt to irreparable. Also, the computer overheats, causing power issues with trying to move the chair with the joystick, and is losing power, which is also not likely to be restored/repaired. His tires also keep wearing out - has to change them every 6 months.     Can now only ambulate less than 25 feet successfully, depending on the day and environment. Denies pressures sores.     This am, his glucose was 230; had only eaten a roast beef sandwich and a small root beer the night before, and his regular glass of cranberry juice. Checked 2 mins later, and it showed 250. Current glucometer is at least 3years old. Reviewed recent lab results    Had been off Januvia for at least 6 months; when he saw his A1c was increased, he started it once daily again (x past month). Had remained on the Metformin 1000 mg BID. No significant change in diet. Has not been checking glucose consistently most recently. TG's increased - did start drinking 16 oz glasses of grapefruit juice and cranberry juice (1 of each daily) - both are \"no sugar added\". Might have pop every other day. Has beer every weekend - malt liquor/heavy beer and has at least a case. Taking Lopessor 50 mg BID and Lisinopril-HCTZ 20-25 mg daily for HTN. BP well-controlled today - 122/80. Has not checked BP recently. Also taking Diclofenac 75 mg BID for ----. Past Medical History:   Diagnosis Date    Arthritis     Carpal tunnel syndrome     Bilateral.    Cerebral palsy (HCC)     Chest pain     Atypical, history of.  Chronic arthralgias of knees and hips     Degenerative disc disease     Lumbar.  Diabetes mellitus, type 2 ([address]     Hip joint valgus deformity     Right hip secondary to muscle imbalance and cerebral palsy.  History of blood transfusion     History of kidney stones     Hypertension     Pyelonephritis 10/14/1988    Reflux     Sleep apnea     Spasticity     Severe, lower extremities, with multiple procedures to the hips and knees.  Ureteral calculi 06/04/1991    Right with obstruction. Past Surgical History:   Procedure Laterality Date    CARPAL TUNNEL RELEASE Right 09/21/2009    CHOLECYSTECTOMY  09/14/2016    laparscopic w/op grams    COLONOSCOPY  10/1/15    normal    HIP OSTEOTOMY Left 1989    HIP OSTEOTOMY Right 1983    Varus derotation.  LITHOTRIPSY Right 06/04/1991    With cystoscopy and ureteral catheter placement.     LUMBAR FUSION 2005    L4-L5.  OTHER SURGICAL HISTORY      pelvic surgery    TOTAL HIP ARTHROPLASTY Left      Family History   Problem Relation Age of Onset    Heart Disease Mother     Heart Disease Father     Heart Attack Brother         age 46    Heart Disease Paternal Uncle      Social History     Tobacco Use    Smoking status: Former Smoker     Types: Cigarettes, Cigars     Last attempt to quit: 10/9/1999     Years since quittin.9    Smokeless tobacco: Never Used    Tobacco comment: quit smoking 10/9/1999. Yenifer Jann RCP, 2016. Substance Use Topics    Alcohol use: Yes     Types: 12 Standard drinks or equivalent per week     Comment: during summer. very occasional during the t      Current Outpatient Medications   Medication Sig Dispense Refill    Blood Glucose Monitoring Suppl (ONE TOUCH ULTRA 2) w/Device KIT 1 kit by Does not apply route daily Use to check blood glucose daily or as needed. 1 kit 0    fenofibrate (TRIGLIDE) 160 MG tablet Take 1 tablet by mouth daily 90 tablet 1    metoprolol tartrate (LOPRESSOR) 50 MG tablet TAKE 1 TABLET BY MOUTH TWICE DAILY 180 tablet 3    TRULICITY 4.81 RK/6.4KX SOPN INJECT 0.75MG INTO THE SKIN ONCE A WEEK 6 mL 3    Lift Chair MISC by Does not apply route 1 each 0    cyclobenzaprine (FLEXERIL) 10 MG tablet TAKE 1 TABLET BY MOUTH TWICE DAILY AS NEEDED 60 tablet 11    famotidine (PEPCID) 40 MG tablet TAKE 1 TABLET BY MOUTH EVERY DAY 30 tablet 11    metFORMIN (GLUCOPHAGE-XR) 500 MG extended release tablet TAKE 2 TABLETS BY MOUTH TWICE DAILY 120 tablet 11    JANUVIA 50 MG tablet TAKE 1 TABLET BY MOUTH DAILY 30 tablet 11    ONETOUCH DELICA LANCETS FINE MISC TEST THREE TIMES WEEKLY AS DIRECTED 100 each 0    Glucose Blood (BLOOD GLUCOSE TEST STRIPS) STRP TEST THREE TIMES WEEKLY AS DIRECTED. Please fill per insurance coverage. 100 strip 1    aspirin 81 MG tablet Take 81 mg by mouth daily.       omeprazole (PRILOSEC) 20 MG capsule Take 20 mg by Cardiovascular:      Rate and Rhythm: Normal rate and regular rhythm. Heart sounds: Normal heart sounds. Pulmonary:      Effort: Pulmonary effort is normal. No respiratory distress. Breath sounds: Normal breath sounds. Abdominal:      General: Bowel sounds are normal. There is no distension. Palpations: Abdomen is soft. Tenderness: There is no abdominal tenderness. Skin:     General: Skin is warm and dry. Neurological:      Mental Status: He is alert and oriented to person, place, and time. Assessment:       Diagnosis Orders   1. Cerebral palsy, unspecified type ([address]     2. Hip joint valgus deformity, unspecified laterality     3. Type 2 diabetes mellitus without complication, without long-term current use of insulin ([facility])  Blood Glucose Monitoring Suppl (ONE TOUCH ULTRA 2) w/Device KIT    Hemoglobin G2J    Basic Metabolic Panel    Microalbumin, Ur   4. Hypertriglyceridemia  fenofibrate (TRIGLIDE) 160 MG tablet    Lipid Panel   5. Essential hypertension     6. Arthralgia of multiple joints           Plan:      Return in about 6 months (around 3/1/2021) for follow-up DM, HLD. Orders Placed This Encounter   Procedures    Hemoglobin A1C     Standing Status:   Future     Standing Expiration Date:   2021    Basic Metabolic Panel     Standing Status:   Future     Standing Expiration Date:   2021   Brandi Grade, Ur     Standing Status:   Future     Standing Expiration Date:   2021    Lipid Panel     Standing Status:   Future     Standing Expiration Date:   2021     Order Specific Question:   Is Patient Fasting?/# of Hours     Answer:   12     Orders Placed This Encounter   Medications    Blood Glucose Monitoring Suppl (ONE TOUCH ULTRA 2) w/Device KIT     Si kit by Does not apply route daily Use to check blood glucose daily or as needed.      Dispense:  1 kit     Refill:  0    fenofibrate (TRIGLIDE) 160 MG tablet     Sig: Take 1 tablet by mouth daily

## 2020-09-29 ENCOUNTER — TELEPHONE (OUTPATIENT)
Dept: FAMILY MEDICINE CLINIC | Age: 55
End: 2020-09-29

## 2020-10-06 ENCOUNTER — HOSPITAL ENCOUNTER (OUTPATIENT)
Dept: LAB | Age: 55
Discharge: HOME | End: 2020-10-06
Payer: COMMERCIAL

## 2020-10-06 ENCOUNTER — OFFICE VISIT (OUTPATIENT)
Dept: PODIATRY | Age: 55
End: 2020-10-06
Payer: COMMERCIAL

## 2020-10-06 VITALS — HEART RATE: 80 BPM | DIASTOLIC BLOOD PRESSURE: 80 MMHG | SYSTOLIC BLOOD PRESSURE: 124 MMHG | RESPIRATION RATE: 20 BRPM

## 2020-10-06 LAB
ANION GAP SERPL CALCULATED.3IONS-SCNC: 12 MMOL/L (ref 9–17)
BUN BLDV-MCNC: 16 MG/DL (ref 6–20)
BUN/CREAT BLD: 14 (ref 9–20)
CALCIUM SERPL-MCNC: 9.8 MG/DL (ref 8.6–10.4)
CHLORIDE BLD-SCNC: 99 MMOL/L (ref 98–107)
CO2: 25 MMOL/L (ref 20–31)
CREAT SERPL-MCNC: 1.14 MG/DL (ref 0.7–1.2)
ESTIMATED AVERAGE GLUCOSE: 171 MG/DL
GFR AFRICAN AMERICAN: >60 ML/MIN
GFR NON-AFRICAN AMERICAN: >60 ML/MIN
GFR SERPL CREATININE-BSD FRML MDRD: ABNORMAL ML/MIN/{1.73_M2}
GFR SERPL CREATININE-BSD FRML MDRD: ABNORMAL ML/MIN/{1.73_M2}
GLUCOSE BLD-MCNC: 180 MG/DL (ref 70–99)
HBA1C MFR BLD: 7.6 % (ref 4.8–5.9)
POTASSIUM SERPL-SCNC: 4 MMOL/L (ref 3.7–5.3)
SODIUM BLD-SCNC: 136 MMOL/L (ref 135–144)

## 2020-10-06 PROCEDURE — 83036 HEMOGLOBIN GLYCOSYLATED A1C: CPT

## 2020-10-06 PROCEDURE — 11721 DEBRIDE NAIL 6 OR MORE: CPT | Performed by: PODIATRIST

## 2020-10-06 PROCEDURE — 99999 PR OFFICE/OUTPT VISIT,PROCEDURE ONLY: CPT | Performed by: PODIATRIST

## 2020-10-06 PROCEDURE — 80048 BASIC METABOLIC PNL TOTAL CA: CPT

## 2020-10-06 PROCEDURE — 36415 COLL VENOUS BLD VENIPUNCTURE: CPT

## 2020-10-06 NOTE — PROGRESS NOTES
Foot Care Worksheet  PCP: Norma Reza DO  Last visit: 09 / 01 / 2020    Nail description:  Thick , Yellow , Crumbly , Marked limitation of ambulation     Pain resulting from thickened and dystrophy of nail plate No    Nails involved  Right   1, 2, 3, 4, 5  (T5-T9)  Left     1, 2, 3, 4, 5  (TA-T4)    Q7 1 Class A Finding - Non traumatic amputation of foot No    Q8 2 Class B Findings - Absent DP pulse No, Absent PT pulse No, Advanced tropic changes (3 required) Yes    Decrease hair growth No, Nail changes/thickening Yes, Pigmented changes/discoloration Yes, Skin texture (thin, shiny) Yes, Skin color (rubor/redness) No    Q9 1 Class B and 2 Class C Findings  Claudication No, Temperature change Yes, Paresthesia Yes, Burning No, Edema Yes

## 2020-10-06 NOTE — PROGRESS NOTES
Subjective:  Sana Lagunas is a 54 y.o. male who presents to the office today for routine DM foot care. Currently denies F/C/N/V. No Known Allergies    Past Medical History:   Diagnosis Date    Arthritis     Carpal tunnel syndrome     Bilateral.    Cerebral palsy (HCC)     Chest pain     Atypical, history of.  Chronic arthralgias of knees and hips     Degenerative disc disease     Lumbar.  Diabetes mellitus, type 2 ([address]     Hip joint valgus deformity     Right hip secondary to muscle imbalance and cerebral palsy.  History of blood transfusion     History of kidney stones     Hypertension     Pyelonephritis 10/14/1988    Reflux     Sleep apnea     Spasticity     Severe, lower extremities, with multiple procedures to the hips and knees.  Ureteral calculi 06/04/1991    Right with obstruction. Prior to Admission medications    Medication Sig Start Date End Date Taking? Authorizing Provider   lisinopril-hydroCHLOROthiazide (PRINZIDE;ZESTORETIC) 20-25 MG per tablet TAKE 1 TABLET BY MOUTH DAILY 9/1/20  Yes Mena Thakkar, DO   diclofenac (VOLTAREN) 75 MG EC tablet TAKE 1 TABLET BY MOUTH TWICE DAILY WITH FOOD 9/1/20  Yes Mena Thakkar DO   Blood Glucose Monitoring Suppl (ONE TOUCH ULTRA 2) w/Device KIT 1 kit by Does not apply route daily Use to check blood glucose daily or as needed.  9/1/20  Yes Mena Thakkar DO   fenofibrate (TRIGLIDE) 160 MG tablet Take 1 tablet by mouth daily 9/1/20  Yes Mena Thakkar DO   metoprolol tartrate (LOPRESSOR) 50 MG tablet TAKE 1 TABLET BY MOUTH TWICE DAILY 7/20/20  Yes Mena Thakkar DO   TRULICITY 6.17 KY/8.0US SOPN INJECT 0.75MG INTO THE SKIN ONCE A WEEK 6/22/20  Yes Kenny Philip, DO   Lift Chair [address] [facility] by Does not apply route 6/15/20  Yes Mena Thakkar DO   cyclobenzaprine (FLEXERIL) 10 MG tablet TAKE 1 TABLET BY MOUTH TWICE DAILY AS NEEDED 2/4/20  Yes Mena Thakkar DO   famotidine (PEPCID) 40 MG tablet TAKE 1 TABLET BY MOUTH EVERY DAY 2/4/20  Yes Radha Thakkar, DO   metFORMIN (GLUCOPHAGE-XR) 500 MG extended release tablet TAKE 2 TABLETS BY MOUTH TWICE DAILY 20  Yes [place] Bijan, DO   JANUVIA 50 MG tablet TAKE 1 TABLET BY MOUTH DAILY 20  Yes [place]ie Boy[place] Bijan, DO   ONETOUCH DELICA LANCETS FINE MISC TEST THREE TIMES WEEKLY AS DIRECTED 3/23/18  Yes [place] Adali Thakkar, DO   Glucose Blood (BLOOD GLUCOSE TEST STRIPS) STRP TEST THREE TIMES WEEKLY AS DIRECTED. Please fill per insurance coverage. 18  Yes [place] Adali Thakkar, DO   aspirin 81 MG tablet Take 81 mg by mouth daily. Yes Historical Provider, MD   omeprazole (PRILOSEC) 20 MG capsule Take 20 mg by mouth daily. Yes Historical Provider, MD       Past Surgical History:   Procedure Laterality Date    CARPAL TUNNEL RELEASE Right 2009    CHOLECYSTECTOMY  2016    laparscopic w/op grams    COLONOSCOPY  10/1/15    normal    HIP OSTEOTOMY Left     HIP OSTEOTOMY Right     Varus derotation.  LITHOTRIPSY Right 1991    With cystoscopy and ureteral catheter placement.  LUMBAR FUSION  2005    L4-L5.  OTHER SURGICAL HISTORY      pelvic surgery    TOTAL HIP ARTHROPLASTY Left        Family History   Problem Relation Age of Onset    Heart Disease Mother     Heart Disease Father     Heart Attack Brother         age 46    Heart Disease Paternal Uncle        Social History     Tobacco Use    Smoking status: Former Smoker     Types: Cigarettes, Cigars     Last attempt to quit: 10/9/1999     Years since quittin.0    Smokeless tobacco: Never Used    Tobacco comment: quit smoking 10/9/1999. Guzman Berry [facility], 2016. Substance Use Topics    Alcohol use: Yes     Types: 12 Standard drinks or equivalent per week     Comment: during summer. very occasional during the wintwr       Review of Systems: All 12 systems reviewed and pertinent positives noted above.     Lower Extremity Physical Examination:     Vitals:   Vitals:    10/06/20 0813   BP: 124/80   Pulse: 80   Resp: 20     General: AAO x 3 in NAD. Vascular: DP and PT pulses palpable 2/4, bilateral.  CFT <3 seconds, bilateral.  Hair growth present to the level of the digits, bilateral.  Edema present, bilateral.  Varicosities present, bilateral. Erythema absent, bilateral. Distal Rubor absent bilateral.  Temperature within normal limits bilateral. Hyperpigmentation present bilateral. Atrophic skin yes. Neurological: Sensation Impaired to light touch to level of digits, bilateral.  Protective sensation intact  0/10 sites via 5.07/10g [place]-Aj Monofilament, bilateral.  negative Tinel's, bilateral.  negative Valleix sign, bilateral.  Vibratory abnormal  bilateral.  Reflexes Decreased bilateral.  Paresthesias positive. Dysthesias negative. Sharp/dull abnormal  bilateral.  Musculoskeletal: Muscle strength 4/5, bilateral.  Pain absent upon palpation bilateral. Normal medial longitudinal arch, bilateral.  Ankle ROM decreased,bilateral.  1st MPJ ROM within normal limits, bilateral.  Dorsally contracted digits absent. No other foot deformities. Integument:   Open lesion present distal L hallux. Interdigital maceration absent to web spaces absent, Bilateral.  Nails left 1, 2, 3, 4, 5 and right 1, 2, 3, 4, 5 thickened, dystrophic and crumbly, discolored with subungual debris. Fissures absent, Bilateral. Hyperkeratotic tissue is absent. Asessment: Patient is a 54 y.o. male with:    Diagnosis Orders   1. Controlled type 2 DM with peripheral circulatory disorder ([facility] [address]     2. Dermatophytosis of nail         Plan: Patient examined and evaluated. Current condition and treatment options discussed in detail. DM foot ed and exam.    All nails as mentioned above debrided in length and thickness. Patient advised OTC methods for treatment of the mycotic nails. Patient will follow up in 10 weeks. Contact office with any questions/problems/concerns.

## 2020-10-12 ENCOUNTER — TELEPHONE (OUTPATIENT)
Dept: FAMILY MEDICINE CLINIC | Age: 55
End: 2020-10-12

## 2020-10-12 NOTE — TELEPHONE ENCOUNTER
Pt calling stating he thinks he had an allergic reaction to the Fenofibrate, states he developed a rash, pt stopped it then and his script was filled after he was out for 3 days and pt tried to restart but when he started taking again the rash got worse, pt stopped the medication 3 days ago and rash is almost gone, please call pt with recommendations.

## 2020-10-13 NOTE — TELEPHONE ENCOUNTER
If pt feels that the timing of onset of rash and resolution of rash are directly connected to the Fenofibrate, we can stop it and add to med intolerance list.  Would pt like to try replacement med, like Gemfibrozil instead for his triglycerides?

## 2020-10-27 ENCOUNTER — TELEPHONE (OUTPATIENT)
Dept: FAMILY MEDICINE CLINIC | Age: 55
End: 2020-10-27

## 2020-10-27 NOTE — TELEPHONE ENCOUNTER
Pt calling on status of power wheelchair, pt is very frustrated, pt would like a call back today regarding this, please advise.

## 2020-10-30 ENCOUNTER — VIRTUAL VISIT (OUTPATIENT)
Dept: FAMILY MEDICINE CLINIC | Age: 55
End: 2020-10-30
Payer: COMMERCIAL

## 2020-10-30 PROCEDURE — 99213 OFFICE O/P EST LOW 20 MIN: CPT | Performed by: FAMILY MEDICINE

## 2020-10-30 PROCEDURE — G8427 DOCREV CUR MEDS BY ELIG CLIN: HCPCS | Performed by: FAMILY MEDICINE

## 2020-10-30 PROCEDURE — 3017F COLORECTAL CA SCREEN DOC REV: CPT | Performed by: FAMILY MEDICINE

## 2020-10-30 NOTE — PROGRESS NOTES
10/30/2020    TELEHEALTH EVALUATION -- Audio/Visual (During POMII-34 public health emergency)    HPI:    Jyotsna Smith (:  1965) has requested an audio/video evaluation for the following concern(s):    Pt here today for face-to-face evaluation (via video) for new power wheelchair. Pt has required use of a wheelchair for the past 32 years (since age [age]) due to his cerebral palsy.  Diagnosed with cerebral palsy at the age 5 after noticing an abnormal gait and frequent falls.  Used crutches in his youth, and then got a wheelchair. Had a manual wheelchair previously, but he can no longer manually propel the wheelchair with b/l upper extremities due to pain and weakness of b/l shoulders. Also his ramps entering/exiting the home are too steep to be able to manually power himself up in a manual wheelchair.     Has had a power wheelchair for the past 10 years effectively and safely. Sun[employer] Show to getting his power wheelchair, he still had frequent falls. Pt can stand up for very brief periods, but uses the chair otherwise completely - helps to have the power chair to sit up higher, so he doesn't always have to try to stand. Can now only ambulate less than 25 feet successfully, depending on the day and environment. Uses the chair both in and out of the home.  Has no questions about a new wheelchair. Has had current wheelchair for at least 5 years, and he purchased it used (feels it is likely 11-7 years old). Current wheelchair is rusting under the seat support, which is felt to irreparable. Also, the computer overheats, causing power issues with trying to move the chair with the joystick, and is losing power, which is also not likely to be restored/repaired. His tires keep wearing out - has to change them every 6 months. Also has been having to change the battery more often - usually every year now, instead of 2-3 years.      Denies pressures sores.     No longer needs head rest or needs the feet to elevate; does need wider foot pad.           Review of Systems   Musculoskeletal: Positive for gait problem. Skin: Negative for wound. Neurological: Positive for weakness. Prior to Visit Medications    Medication Sig Taking? Authorizing Provider   lisinopril-hydroCHLOROthiazide (PRINZIDE;ZESTORETIC) 20-25 MG per tablet TAKE 1 TABLET BY MOUTH DAILY Yes Henrietta Thakkar, DO   diclofenac (VOLTAREN) 75 MG EC tablet TAKE 1 TABLET BY MOUTH TWICE DAILY WITH FOOD Yes Henrietta Thakkar, DO   Blood Glucose Monitoring Suppl (ONE TOUCH ULTRA 2) w/Device KIT 1 kit by Does not apply route daily Use to check blood glucose daily or as needed. Yes Henrietta Thakkar, DO   metoprolol tartrate (LOPRESSOR) 50 MG tablet TAKE 1 TABLET BY MOUTH TWICE DAILY Yes Gilda Thakkar, DO   TRULICITY 7.43 QH/4.0PC SOPN INJECT 0.75MG INTO THE SKIN ONCE A WEEK Yes Abiodun Polanco, DO   Lift Chair [address] [facility] by Does not apply route Yes Henrietta Thakkar, DO   cyclobenzaprine (FLEXERIL) 10 MG tablet TAKE 1 TABLET BY MOUTH TWICE DAILY AS NEEDED Yes Gab Thakkar, DO   famotidine (PEPCID) 40 MG tablet TAKE 1 TABLET BY MOUTH EVERY DAY Yes Gilda Thakkar DO   metFORMIN (GLUCOPHAGE-XR) 500 MG extended release tablet TAKE 2 TABLETS BY MOUTH TWICE DAILY Yes Gilda Thakkar DO   JANUVIA 50 MG tablet TAKE 1 TABLET BY MOUTH DAILY Yes Gab Thakkar, DO   ONETOUCH DELICA LANCETS FINE MISC TEST THREE TIMES WEEKLY AS DIRECTED Yes Gab Thakkar DO   Glucose Blood (BLOOD GLUCOSE TEST STRIPS) STRP TEST THREE TIMES WEEKLY AS DIRECTED. Please fill per insurance coverage. Yes Henrietta Thakkar, DO   aspirin 81 MG tablet Take 81 mg by mouth daily. Yes Historical Provider, MD   omeprazole (PRILOSEC) 20 MG capsule Take 20 mg by mouth daily.  Yes Historical Provider, MD   gemfibrozil (LOPID) 600 MG tablet Take 1 tablet by mouth 2 times daily  Abiodun Polanco, DO       Social History     Tobacco Use    Smoking status: Former Smoker     Types: Cigarettes, Cigars     Last attempt to quit: 10/9/1999 Years since quittin.1    Smokeless tobacco: Never Used    Tobacco comment: quit smoking 10/9/1999. Kuldip Burrows P, 2016. Substance Use Topics    Alcohol use: Yes     Types: 12 Standard drinks or equivalent per week     Comment: during summer. very occasional during the wintwr    Drug use: No        Allergies   Allergen Reactions    Fenofibrate Rash   ,   Past Medical History:   Diagnosis Date    Arthritis     Carpal tunnel syndrome     Bilateral.    Cerebral palsy (HCC)     Chest pain     Atypical, history of.  Chronic arthralgias of knees and hips     Degenerative disc disease     Lumbar.  Diabetes mellitus, type 2 ([address]     Hip joint valgus deformity     Right hip secondary to muscle imbalance and cerebral palsy.  History of blood transfusion     History of kidney stones     Hypertension     Pyelonephritis 10/14/1988    Reflux     Sleep apnea     Spasticity     Severe, lower extremities, with multiple procedures to the hips and knees.  Ureteral calculi 1991    Right with obstruction. ,   Past Surgical History:   Procedure Laterality Date    CARPAL TUNNEL RELEASE Right 2009    CHOLECYSTECTOMY  2016    laparscopic w/op grams    COLONOSCOPY  10/1/15    normal    HIP OSTEOTOMY Left     HIP OSTEOTOMY Right     Varus derotation.  LITHOTRIPSY Right 1991    With cystoscopy and ureteral catheter placement.  LUMBAR FUSION  2005    L4-L5.     OTHER SURGICAL HISTORY      pelvic surgery    TOTAL HIP ARTHROPLASTY Left        PHYSICAL EXAMINATION:  Vital Signs: (As obtained by patient/caregiver or practitioner observation)    Height: 6'  Weight: 295 lbs    Constitutional: [x] Appears well-developed and well-nourished [x] No apparent distress      [] Abnormal-   Mental status  [x] Alert and awake  [x] Oriented to person/place/time [x]Able to follow commands      Eyes:  EOM    [x]  Normal  [] Abnormal-  Sclera  []  Normal  [] Abnormal -         Discharge []  None visible  [] Abnormal -    HENT:   [x] Normocephalic, atraumatic. [] Abnormal   [] Mouth/Throat: Mucous membranes are moist.     External Ears [x] Normal  [] Abnormal-     Neck: [] No visualized mass     Pulmonary/Chest: [x] Respiratory effort normal.  [x] No visualized signs of difficulty breathing or respiratory distress        [] Abnormal-      Musculoskeletal:   [] Normal gait with no signs of ataxia         [x] Normal range of motion of neck        Use of wheelchair not noted on video today, but have visualized pt's use of power chair on multiple previous occasions, and pt uses without difficulty. Neurological:        [] No Facial Asymmetry (Cranial nerve 7 motor function) (limited exam to video visit)          [] No gaze palsy        [] Abnormal-         Skin:        [] No significant exanthematous lesions or discoloration noted on facial skin         [] Abnormal-            Psychiatric:       [x] Normal Affect [] No Hallucinations        [] Abnormal-       ASSESSMENT/PLAN:  1. Cerebral palsy, unspecified type ([address]    2. Muscle spasticity    3. Hip joint valgus deformity, unspecified laterality      Will send OV note and order for new power wheelchair to [employer] (company of pt's choice). Will complete necessary paperwork as it is received. I do feel this is medically necessary for pt to have secondary to his chronic medical diagnoses and current physical needs. Return in about 4 months (around 3/9/2021) for f/u chronic conditions. Rubin Be is a 54 y.o. male being evaluated by a Virtual Visit (video visit) encounter to address concerns as mentioned above. A caregiver was present when appropriate. Due to this being a TeleHealth encounter (During [place] [address] public health emergency), evaluation of the following organ systems was limited: Vitals/Constitutional/EENT/Resp/CV/GI//MS/Neuro/Skin/Heme-Lymph-Imm.   Pursuant to the emergency declaration under the [address] [place] [address], [address] [place] [address] authority and the [employer] and [employer] Act, this Virtual Visit was conducted with patient's (and/or legal guardian's) consent, to reduce the patient's risk of exposure to COVID-19 and provide necessary medical care. The patient (and/or legal guardian) has also been advised to contact this office for worsening conditions or problems, and seek emergency medical treatment and/or call 911 if deemed necessary. Patient identification was verified at the start of the visit: Yes    Total time spent on this encounter: Not billed by time    Services were provided through a video synchronous discussion virtually to substitute for in-person clinic visit. Patient and provider were located at their individual homes. --Sekou Mae DO on 11/23/2020 at 10:16 AM    An electronic signature was used to authenticate this note.

## 2020-11-02 ENCOUNTER — HOSPITAL ENCOUNTER (OUTPATIENT)
Age: 55
Setting detail: SPECIMEN
Discharge: HOME | End: 2020-11-02
Payer: COMMERCIAL

## 2020-11-02 LAB
CREATININE URINE: 140.6 MG/DL (ref 39–259)
MICROALBUMIN/CREAT 24H UR: 42 MG/L
MICROALBUMIN/CREAT UR-RTO: 30 MCG/MG CREAT

## 2020-11-02 PROCEDURE — 82043 UR ALBUMIN QUANTITATIVE: CPT

## 2020-11-02 PROCEDURE — 82570 ASSAY OF URINE CREATININE: CPT

## 2020-11-02 RX ORDER — GEMFIBROZIL 600 MG/1
600 TABLET, FILM COATED ORAL 2 TIMES DAILY
Qty: 60 TABLET | Refills: 5 | Status: SHIPPED | OUTPATIENT
Start: 2020-11-02 | End: 2021-03-09 | Stop reason: SDUPTHER

## 2020-11-16 ENCOUNTER — TELEPHONE (OUTPATIENT)
Dept: FAMILY MEDICINE CLINIC | Age: 55
End: 2020-11-16

## 2020-11-23 RX ORDER — WHEELCHAIR
EACH MISCELLANEOUS
Qty: 1 EACH | Refills: 0 | Status: SHIPPED | OUTPATIENT
Start: 2020-11-23

## 2020-11-23 NOTE — TELEPHONE ENCOUNTER
LATE ENTRY: Pt was called on 10/29 - apologized to pt that I thought the order and paperwork for his new power wheelchair had been sent to [employer], but it had not, which was my fault. Unfortunately, Medicare requires the paperwork/order to be received by equipment company within 42 days of OV, and this time period had elapsed, so pt would need seen again. Asked if he would be willing to do virtual visit for this, and he agreed. Pt was seen on 10/30 and updated visit was done virtually.

## 2020-11-23 NOTE — TELEPHONE ENCOUNTER
Please call pt and inform him that order and copy of VV note has been sent to [facility] and we have confirmation they received it. I have touched base with them this am and we have sent them everything they need at this time; they stated he should be hearing from them this week about an appt to set up PT eval for the specifics needed for his chair, which may be able to be done at his home or virtually. After that is done, they will get a few more papers/orders sent and signed by me, and then send the order to Medicare.

## 2020-12-15 ENCOUNTER — OFFICE VISIT (OUTPATIENT)
Dept: PODIATRY | Age: 55
End: 2020-12-15
Payer: COMMERCIAL

## 2020-12-15 VITALS — DIASTOLIC BLOOD PRESSURE: 76 MMHG | SYSTOLIC BLOOD PRESSURE: 128 MMHG | RESPIRATION RATE: 20 BRPM | HEART RATE: 80 BPM

## 2020-12-15 PROCEDURE — 11721 DEBRIDE NAIL 6 OR MORE: CPT | Performed by: PODIATRIST

## 2020-12-15 NOTE — PROGRESS NOTES
Subjective:  Curt Álvarez is a 54 y.o. male who presents to the office today for routine DM foot care. Currently denies F/C/N/V. Allergies   Allergen Reactions    Fenofibrate Rash       Past Medical History:   Diagnosis Date    Arthritis     Carpal tunnel syndrome     Bilateral.    Cerebral palsy (HCC)     Chest pain     Atypical, history of.  Chronic arthralgias of knees and hips     Degenerative disc disease     Lumbar.  Diabetes mellitus, type 2 ([address]     Hip joint valgus deformity     Right hip secondary to muscle imbalance and cerebral palsy.  History of blood transfusion     History of kidney stones     Hypertension     Pyelonephritis 10/14/1988    Reflux     Sleep apnea     Spasticity     Severe, lower extremities, with multiple procedures to the hips and knees.  Ureteral calculi 06/04/1991    Right with obstruction. Prior to Admission medications    Medication Sig Start Date End Date Taking? Authorizing Provider   [facility] [place] [facility]) MISC Use daily 11/23/20  Yes Becky Thakkar, DO   gemfibrozil (LOPID) 600 MG tablet Take 1 tablet by mouth 2 times daily 11/2/20  Yes Becky Thakkar DO   lisinopril-hydroCHLOROthiazide (PRINZIDE;ZESTORETIC) 20-25 MG per tablet TAKE 1 TABLET BY MOUTH DAILY 9/1/20  Yes Becky Minerva Thakkar, DO   diclofenac (VOLTAREN) 75 MG EC tablet TAKE 1 TABLET BY MOUTH TWICE DAILY WITH FOOD 9/1/20  Yes Becky Thakkar, DO   Blood Glucose Monitoring Suppl (ONE TOUCH ULTRA 2) w/Device KIT 1 kit by Does not apply route daily Use to check blood glucose daily or as needed.  9/1/20  Yes Becky Minerva Thakkar DO   metoprolol tartrate (LOPRESSOR) 50 MG tablet TAKE 1 TABLET BY MOUTH TWICE DAILY 7/20/20  Yes Becky Thakkar DO   TRULICITY 9.82 AL/0.2RX SOPN INJECT 0.75MG INTO THE SKIN ONCE A WEEK 6/22/20  Yes Timmy Radford DO   Lift Chair [address] [facility] by Does not apply route 6/15/20  Yes Timmy Radford DO cyclobenzaprine (FLEXERIL) 10 MG tablet TAKE 1 TABLET BY MOUTH TWICE DAILY AS NEEDED 20  Yes Henrietta Thakkar, DO   famotidine (PEPCID) 40 MG tablet TAKE 1 TABLET BY MOUTH EVERY DAY 20  Yes Henrietta Thakkar, DO   metFORMIN (GLUCOPHAGE-XR) 500 MG extended release tablet TAKE 2 TABLETS BY MOUTH TWICE DAILY 20  Yes Henrietta Thakkar, DO   JANUVIA 50 MG tablet TAKE 1 TABLET BY MOUTH DAILY 20  Yes Henrietta Thakkar, DO   ONETOUCH DELICA LANCETS FINE MISC TEST THREE TIMES WEEKLY AS DIRECTED 3/23/18  Yes Henrietta Thakkar, DO   Glucose Blood (BLOOD GLUCOSE TEST STRIPS) STRP TEST THREE TIMES WEEKLY AS DIRECTED. Please fill per insurance coverage. 18  Yes Henrietta Thakkar, DO   aspirin 81 MG tablet Take 81 mg by mouth daily. Yes Historical Provider, MD   omeprazole (PRILOSEC) 20 MG capsule Take 20 mg by mouth daily. Yes Historical Provider, MD       Past Surgical History:   Procedure Laterality Date    CARPAL TUNNEL RELEASE Right 2009    CHOLECYSTECTOMY  2016    laparscopic w/op grams    COLONOSCOPY  10/1/15    normal    HIP OSTEOTOMY Left     HIP OSTEOTOMY Right     Varus derotation.  LITHOTRIPSY Right 1991    With cystoscopy and ureteral catheter placement.  LUMBAR FUSION  2005    L4-L5.  OTHER SURGICAL HISTORY      pelvic surgery    TOTAL HIP ARTHROPLASTY Left        Family History   Problem Relation Age of Onset    Heart Disease Mother     Heart Disease Father     Heart Attack Brother         age 46    Heart Disease Paternal Uncle        Social History     Tobacco Use    Smoking status: Former Smoker     Types: Cigarettes, Cigars     Quit date: 10/9/1999     Years since quittin.2    Smokeless tobacco: Never Used    Tobacco comment: quit smoking 10/9/1999. Romario Rosales [facility], 2016. Substance Use Topics    Alcohol use: Yes     Types: 12 Standard drinks or equivalent per week     Comment: during summer.   very occasional during the wintwr Review of Systems: All 12 systems reviewed and pertinent positives noted above. Lower Extremity Physical Examination:     Vitals:   Vitals:    12/15/20 0819   BP: 128/76   Pulse: 80   Resp: 20     General: AAO x 3 in NAD. Vascular: DP and PT pulses palpable 2/4, bilateral.  CFT <3 seconds, bilateral.  Hair growth present to the level of the digits, bilateral.  Edema present, bilateral.  Varicosities present, bilateral. Erythema absent, bilateral. Distal Rubor absent bilateral.  Temperature within normal limits bilateral. Hyperpigmentation present bilateral. Atrophic skin yes. Neurological: Sensation Impaired to light touch to level of digits, bilateral.  Protective sensation intact  0/10 sites via 5.07/10g [place] Monofilament, bilateral.  negative Tinel's, bilateral.  negative Valleix sign, bilateral.  Vibratory abnormal  bilateral.  Reflexes Decreased bilateral.  Paresthesias positive. Dysthesias negative. Sharp/dull abnormal  bilateral.  Musculoskeletal: Muscle strength 4/5, bilateral.  Pain absent upon palpation bilateral. Normal medial longitudinal arch, bilateral.  Ankle ROM decreased,bilateral.  1st MPJ ROM within normal limits, bilateral.  Dorsally contracted digits absent. No other foot deformities. Integument:   Open lesion present distal L hallux. Interdigital maceration absent to web spaces absent, Bilateral.  Nails left 1, 2, 3, 4, 5 and right 1, 2, 3, 4, 5 thickened, dystrophic and crumbly, discolored with subungual debris. Fissures absent, Bilateral. Hyperkeratotic tissue is absent. Asessment: Patient is a 54 y.o. male with:    Diagnosis Orders   1. Controlled type 2 DM with peripheral circulatory disorder ([facility] [address]     2. Dermatophytosis of nail         Plan: Patient examined and evaluated. Current condition and treatment options discussed in detail. DM foot ed and exam.    All nails as mentioned above debrided in length and thickness. Patient advised OTC methods for treatment of the mycotic nails. Patient will follow up in 10 weeks. Contact office with any questions/problems/concerns.

## 2020-12-15 NOTE — PROGRESS NOTES
Foot Care Worksheet  PCP: Cordell Riley DO  Last visit: 10 / 30 / 2020    Nail description:  Thick , Yellow , Crumbly , Marked limitation of ambulation     Pain resulting from thickened and dystrophy of nail plate No    Nails involved  Right   1, 2, 3, 4, 5  (T5-T9)  Left     1, 2, 3, 4, 5  (TA-T4)    Q7 1 Class A Finding - Non traumatic amputation of foot No    Q8 2 Class B Findings - Absent DP pulse No, Absent PT pulse No, Advanced tropic changes (3 required) Yes    Decrease hair growth No, Nail changes/thickening Yes, Pigmented changes/discoloration Yes, Skin texture (thin, shiny) Yes, Skin color (rubor/redness) No    Q9 1 Class B and 2 Class C Findings  Claudication No, Temperature change Yes, Paresthesia Yes, Burning No, Edema Yes

## 2021-01-07 ENCOUNTER — TELEPHONE (OUTPATIENT)
Dept: FAMILY MEDICINE CLINIC | Age: 56
End: 2021-01-07

## 2021-01-08 NOTE — TELEPHONE ENCOUNTER
Paperwork has been completed and faxed back to [employer] today. Please let pt know that we just received from [employer] this week when I returned from being off due to holiday at end of last week.

## 2021-01-08 NOTE — TELEPHONE ENCOUNTER
[facility]ax confirmation rec'd. Left message on Petey[employer] ROSAS.

## 2021-01-20 DIAGNOSIS — E11.9 TYPE 2 DIABETES MELLITUS WITHOUT COMPLICATION, WITHOUT LONG-TERM CURRENT USE OF INSULIN (HCC): ICD-10-CM

## 2021-01-21 RX ORDER — METFORMIN HYDROCHLORIDE 500 MG/1
TABLET, EXTENDED RELEASE ORAL
Qty: 120 TABLET | Refills: 11 | Status: SHIPPED | OUTPATIENT
Start: 2021-01-21 | End: 2022-01-31

## 2021-02-01 DIAGNOSIS — E11.9 TYPE 2 DIABETES MELLITUS WITHOUT COMPLICATION, WITHOUT LONG-TERM CURRENT USE OF INSULIN (HCC): ICD-10-CM

## 2021-02-19 DIAGNOSIS — M62.838 MUSCLE SPASTICITY: ICD-10-CM

## 2021-02-19 RX ORDER — CYCLOBENZAPRINE HCL 10 MG
TABLET ORAL
Qty: 180 TABLET | Refills: 3 | Status: SHIPPED | OUTPATIENT
Start: 2021-02-19 | End: 2022-08-12

## 2021-02-23 ENCOUNTER — HOSPITAL ENCOUNTER (OUTPATIENT)
Dept: LAB | Age: 56
Discharge: HOME | End: 2021-02-23
Payer: COMMERCIAL

## 2021-02-23 ENCOUNTER — OFFICE VISIT (OUTPATIENT)
Dept: PODIATRY | Age: 56
End: 2021-02-23
Payer: COMMERCIAL

## 2021-02-23 VITALS — DIASTOLIC BLOOD PRESSURE: 70 MMHG | SYSTOLIC BLOOD PRESSURE: 126 MMHG | HEART RATE: 80 BPM | RESPIRATION RATE: 20 BRPM

## 2021-02-23 DIAGNOSIS — B35.1 DERMATOPHYTOSIS OF NAIL: ICD-10-CM

## 2021-02-23 DIAGNOSIS — E11.9 TYPE 2 DIABETES MELLITUS WITHOUT COMPLICATION, WITHOUT LONG-TERM CURRENT USE OF INSULIN (HCC): ICD-10-CM

## 2021-02-23 DIAGNOSIS — E11.51 CONTROLLED TYPE 2 DM WITH PERIPHERAL CIRCULATORY DISORDER (HCC): Primary | ICD-10-CM

## 2021-02-23 DIAGNOSIS — Z12.5 SCREENING FOR PROSTATE CANCER: ICD-10-CM

## 2021-02-23 DIAGNOSIS — E78.1 HYPERTRIGLYCERIDEMIA: ICD-10-CM

## 2021-02-23 DIAGNOSIS — I10 ESSENTIAL HYPERTENSION: ICD-10-CM

## 2021-02-23 LAB
ABSOLUTE EOS #: 0.19 K/UL (ref 0–0.44)
ABSOLUTE IMMATURE GRANULOCYTE: 0.06 K/UL (ref 0–0.3)
ABSOLUTE LYMPH #: 1.68 K/UL (ref 1.1–3.7)
ABSOLUTE MONO #: 0.41 K/UL (ref 0.1–1.2)
ALBUMIN SERPL-MCNC: 4.5 G/DL (ref 3.5–5.2)
ALBUMIN/GLOBULIN RATIO: 1.5 (ref 1–2.5)
ALP BLD-CCNC: 57 U/L (ref 40–129)
ALT SERPL-CCNC: 32 U/L (ref 5–41)
ANION GAP SERPL CALCULATED.3IONS-SCNC: 12 MMOL/L (ref 9–17)
AST SERPL-CCNC: 33 U/L
BASOPHILS # BLD: 1 % (ref 0–2)
BASOPHILS ABSOLUTE: 0.06 K/UL (ref 0–0.2)
BILIRUB SERPL-MCNC: 0.39 MG/DL (ref 0.3–1.2)
BUN BLDV-MCNC: 19 MG/DL (ref 6–20)
BUN/CREAT BLD: 17 (ref 9–20)
CALCIUM SERPL-MCNC: 10.1 MG/DL (ref 8.6–10.4)
CHLORIDE BLD-SCNC: 101 MMOL/L (ref 98–107)
CHOLESTEROL/HDL RATIO: 6.9
CHOLESTEROL: 173 MG/DL
CO2: 25 MMOL/L (ref 20–31)
CREAT SERPL-MCNC: 1.14 MG/DL (ref 0.7–1.2)
DIFFERENTIAL TYPE: ABNORMAL
EOSINOPHILS RELATIVE PERCENT: 3 % (ref 1–4)
ESTIMATED AVERAGE GLUCOSE: 148 MG/DL
GFR AFRICAN AMERICAN: >60 ML/MIN
GFR NON-AFRICAN AMERICAN: >60 ML/MIN
GFR SERPL CREATININE-BSD FRML MDRD: ABNORMAL ML/MIN/{1.73_M2}
GFR SERPL CREATININE-BSD FRML MDRD: ABNORMAL ML/MIN/{1.73_M2}
GLUCOSE BLD-MCNC: 162 MG/DL (ref 70–99)
HBA1C MFR BLD: 6.8 % (ref 4–6)
HCT VFR BLD CALC: 38.9 % (ref 40.7–50.3)
HDLC SERPL-MCNC: 25 MG/DL
HEMOGLOBIN: 13.3 G/DL (ref 13–17)
IMMATURE GRANULOCYTES: 1 %
LDL CHOLESTEROL: 82 MG/DL (ref 0–130)
LYMPHOCYTES # BLD: 26 % (ref 24–43)
MCH RBC QN AUTO: 30 PG (ref 25.2–33.5)
MCHC RBC AUTO-ENTMCNC: 34.2 G/DL (ref 25.2–33.5)
MCV RBC AUTO: 87.8 FL (ref 82.6–102.9)
MONOCYTES # BLD: 6 % (ref 3–12)
NRBC AUTOMATED: 0 PER 100 WBC
PDW BLD-RTO: 12.9 % (ref 11.8–14.4)
PLATELET # BLD: 210 K/UL (ref 138–453)
PLATELET ESTIMATE: ABNORMAL
PMV BLD AUTO: 9.6 FL (ref 8.1–13.5)
POTASSIUM SERPL-SCNC: 4.6 MMOL/L (ref 3.7–5.3)
PROSTATE SPECIFIC ANTIGEN: 0.26 UG/L
RBC # BLD: 4.43 M/UL (ref 4.21–5.77)
RBC # BLD: ABNORMAL 10*6/UL
SEG NEUTROPHILS: 63 % (ref 36–65)
SEGMENTED NEUTROPHILS ABSOLUTE COUNT: 4.15 K/UL (ref 1.5–8.1)
SODIUM BLD-SCNC: 138 MMOL/L (ref 135–144)
TOTAL PROTEIN: 7.5 G/DL (ref 6.4–8.3)
TRIGL SERPL-MCNC: 330 MG/DL
VLDLC SERPL CALC-MCNC: ABNORMAL MG/DL (ref 1–30)
WBC # BLD: 6.6 K/UL (ref 3.5–11.3)
WBC # BLD: ABNORMAL 10*3/UL

## 2021-02-23 PROCEDURE — 80061 LIPID PANEL: CPT

## 2021-02-23 PROCEDURE — 99999 PR OFFICE/OUTPT VISIT,PROCEDURE ONLY: CPT | Performed by: PODIATRIST

## 2021-02-23 PROCEDURE — G0103 PSA SCREENING: HCPCS

## 2021-02-23 PROCEDURE — 83036 HEMOGLOBIN GLYCOSYLATED A1C: CPT

## 2021-02-23 PROCEDURE — 80053 COMPREHEN METABOLIC PANEL: CPT

## 2021-02-23 PROCEDURE — 85025 COMPLETE CBC W/AUTO DIFF WBC: CPT

## 2021-02-23 PROCEDURE — 36415 COLL VENOUS BLD VENIPUNCTURE: CPT

## 2021-02-23 PROCEDURE — 11721 DEBRIDE NAIL 6 OR MORE: CPT | Performed by: PODIATRIST

## 2021-02-23 NOTE — PROGRESS NOTES
Foot Care Worksheet  PCP: Digna Newton DO  Last visit: 10 / 30 / 2020    Nail description:  Thick , Yellow , Crumbly , Marked limitation of ambulation     Pain resulting from thickened and dystrophy of nail plate No    Nails involved  Right   1, 2, 3, 4, 5  (T5-T9)  Left     1, 2, 3, 4, 5  (TA-T4)    Q7 1 Class A Finding - Non traumatic amputation of foot No    Q8 2 Class B Findings - Absent DP pulse No, Absent PT pulse No, Advanced tropic changes (3 required) Yes    Decrease hair growth No, Nail changes/thickening Yes, Pigmented changes/discoloration Yes, Skin texture (thin, shiny) Yes, Skin color (rubor/redness) No    Q9 1 Class B and 2 Class C Findings  Claudication No, Temperature change Yes, Paresthesia Yes, Burning No, Edema Yes
office with any questions/problems/concerns.

## 2021-03-09 ENCOUNTER — OFFICE VISIT (OUTPATIENT)
Dept: FAMILY MEDICINE CLINIC | Age: 56
End: 2021-03-09
Payer: COMMERCIAL

## 2021-03-09 VITALS
HEIGHT: 72 IN | WEIGHT: 284 LBS | OXYGEN SATURATION: 97 % | TEMPERATURE: 97.7 F | DIASTOLIC BLOOD PRESSURE: 86 MMHG | SYSTOLIC BLOOD PRESSURE: 100 MMHG | HEART RATE: 100 BPM | BODY MASS INDEX: 38.47 KG/M2

## 2021-03-09 DIAGNOSIS — M47.816 OSTEOARTHRITIS OF LUMBAR SPINE, UNSPECIFIED SPINAL OSTEOARTHRITIS COMPLICATION STATUS: ICD-10-CM

## 2021-03-09 DIAGNOSIS — I10 ESSENTIAL HYPERTENSION: ICD-10-CM

## 2021-03-09 DIAGNOSIS — M25.50 ARTHRALGIA OF MULTIPLE JOINTS: ICD-10-CM

## 2021-03-09 DIAGNOSIS — E78.1 HYPERTRIGLYCERIDEMIA: ICD-10-CM

## 2021-03-09 DIAGNOSIS — K21.9 GASTROESOPHAGEAL REFLUX DISEASE, UNSPECIFIED WHETHER ESOPHAGITIS PRESENT: ICD-10-CM

## 2021-03-09 DIAGNOSIS — E11.9 TYPE 2 DIABETES MELLITUS WITHOUT COMPLICATION, WITHOUT LONG-TERM CURRENT USE OF INSULIN (HCC): Primary | ICD-10-CM

## 2021-03-09 PROCEDURE — 3017F COLORECTAL CA SCREEN DOC REV: CPT | Performed by: FAMILY MEDICINE

## 2021-03-09 PROCEDURE — G8484 FLU IMMUNIZE NO ADMIN: HCPCS | Performed by: FAMILY MEDICINE

## 2021-03-09 PROCEDURE — 2022F DILAT RTA XM EVC RTNOPTHY: CPT | Performed by: FAMILY MEDICINE

## 2021-03-09 PROCEDURE — G8417 CALC BMI ABV UP PARAM F/U: HCPCS | Performed by: FAMILY MEDICINE

## 2021-03-09 PROCEDURE — 1036F TOBACCO NON-USER: CPT | Performed by: FAMILY MEDICINE

## 2021-03-09 PROCEDURE — 99214 OFFICE O/P EST MOD 30 MIN: CPT | Performed by: FAMILY MEDICINE

## 2021-03-09 PROCEDURE — 3044F HG A1C LEVEL LT 7.0%: CPT | Performed by: FAMILY MEDICINE

## 2021-03-09 PROCEDURE — G8427 DOCREV CUR MEDS BY ELIG CLIN: HCPCS | Performed by: FAMILY MEDICINE

## 2021-03-09 RX ORDER — LISINOPRIL AND HYDROCHLOROTHIAZIDE 25; 20 MG/1; MG/1
1 TABLET ORAL DAILY
Qty: 90 TABLET | Refills: 3 | Status: CANCELLED | OUTPATIENT
Start: 2021-03-09

## 2021-03-09 RX ORDER — DICLOFENAC SODIUM 75 MG/1
TABLET, DELAYED RELEASE ORAL
Qty: 180 TABLET | Refills: 3 | Status: CANCELLED | OUTPATIENT
Start: 2021-03-09

## 2021-03-09 RX ORDER — GLUCOSAMINE HCL/CHONDROITIN SU 500-400 MG
CAPSULE ORAL
Qty: 100 STRIP | Refills: 3 | Status: SHIPPED | OUTPATIENT
Start: 2021-03-09

## 2021-03-09 RX ORDER — FAMOTIDINE 40 MG/1
TABLET, FILM COATED ORAL
Qty: 30 TABLET | Refills: 11 | Status: CANCELLED | OUTPATIENT
Start: 2021-03-09

## 2021-03-09 RX ORDER — GEMFIBROZIL 600 MG/1
600 TABLET, FILM COATED ORAL 2 TIMES DAILY
Qty: 180 TABLET | Refills: 1 | Status: SHIPPED | OUTPATIENT
Start: 2021-03-09 | End: 2021-11-18

## 2021-03-09 RX ORDER — METOPROLOL TARTRATE 50 MG/1
TABLET, FILM COATED ORAL
Qty: 180 TABLET | Refills: 3 | Status: CANCELLED | OUTPATIENT
Start: 2021-03-09

## 2021-03-09 RX ORDER — DULAGLUTIDE 0.75 MG/.5ML
INJECTION, SOLUTION SUBCUTANEOUS
Qty: 6 ML | Refills: 3 | Status: CANCELLED | OUTPATIENT
Start: 2021-03-09

## 2021-03-09 RX ORDER — BLOOD-GLUCOSE METER
1 EACH MISCELLANEOUS DAILY
Qty: 1 KIT | Refills: 0 | Status: SHIPPED | OUTPATIENT
Start: 2021-03-09 | End: 2021-09-17

## 2021-03-09 ASSESSMENT — ENCOUNTER SYMPTOMS
DIARRHEA: 0
ABDOMINAL PAIN: 0
CONSTIPATION: 0

## 2021-03-09 NOTE — PROGRESS NOTES
[address] [address]  [address], [address] [place]  [phone]      Micheline Roper is a 54 y.o. male who presents today for his medical conditions/complaints as noted below. Micheline Roper is c/o of Diabetes (6 mo)      HPI:     Pt here today for follow-up of DM and HLD. Results reviewed with pt during OV today - A1c improved to 6.8%. Pt states he has cut down his portion sizes and has decreased beer and grapefruit juice consumption. Has lost 11 lbs since his last OV six months ago. Taking Januvia 50 mg daily, Metformin XR 4989 mg BID, and Trulicity 3.47 mg once weekly on Sunday's for DM - taking compliantly.  Also taking ASA 81 mg nightly. LP shows improved HDL to 25 and TG's to 330. Pt did recently stop the Gemfibrozil 600 mg BID x past 10-14 days, as he thought it wasn't working, but on review of previous levels today in his chart, it does appear that his TG's are the lowest they have been in the past 3 years, so he will re-start. Tolerating med OK. Taking Lisinopril-HCTZ 20-25 mg daily and Lopressor 50 mg BID for HTN - stable. BP well-controlled today - 100/86. PSA normal; CMP normal, other than glucose 162; CBC normal, with improved Hgb to 13.3. Taking Diclofenac 75 mg BID for muscle pain - stable. Had a fall approx a week ago - fell backward when wheelchair flipped over a lip in the cement, and he hit the back of his head on cement. Also got hit in the face with a heavy wooden object that he was holding in his lap, causing pain in nose and swollen lip. Has been feeling more neck pain and occipital HA, more on the L, since then. Does not feel a lump on his skull. Has some restricted ROM of his neck to the left - feels \"tight\". Taking Pepcid 40 mg daily and OTC Prilosec 20 mg nightly - stable; no recent heartburn.      Taking Flexeril 10 mg nightly at bedtime for muscle spasticity; sometimes has to take one during the day as needed.  Also taking Diclofenac 75 mg BID.     Pt using Bi-Pap machine nightly for CHAD - had started on CPAP machine, but was transitioned to Bi-Pap when he was still having issues.      Pt was able to get new power chair - working very well for him. Past Medical History:   Diagnosis Date    Arthritis     Carpal tunnel syndrome     Bilateral.    Cerebral palsy (HCC)     Chest pain     Atypical, history of.  Chronic arthralgias of knees and hips     Degenerative disc disease     Lumbar.  Diabetes mellitus, type 2 ([address]     Hip joint valgus deformity     Right hip secondary to muscle imbalance and cerebral palsy.  History of blood transfusion     History of kidney stones     Hypertension     Pyelonephritis 10/14/1988    Reflux     Sleep apnea     Spasticity     Severe, lower extremities, with multiple procedures to the hips and knees.  Ureteral calculi 1991    Right with obstruction. Past Surgical History:   Procedure Laterality Date    CARPAL TUNNEL RELEASE Right 2009    CHOLECYSTECTOMY  2016    laparscopic w/op grams    COLONOSCOPY  10/1/15    normal    HIP OSTEOTOMY Left     HIP OSTEOTOMY Right     Varus derotation.  LITHOTRIPSY Right 1991    With cystoscopy and ureteral catheter placement.  LUMBAR FUSION  2005    L4-L5.  OTHER SURGICAL HISTORY      pelvic surgery    TOTAL HIP ARTHROPLASTY Left      Family History   Problem Relation Age of Onset    Heart Disease Mother     Heart Disease Father     Heart Attack Brother         age 46    Heart Disease Paternal Uncle      Social History     Tobacco Use    Smoking status: Former Smoker     Types: Cigarettes, Cigars     Quit date: 10/9/1999     Years since quittin.4    Smokeless tobacco: Never Used    Tobacco comment: quit smoking 10/9/1999. [facility] Concepc[facility], 2016.    Substance Use Topics    Alcohol use: Yes     Types: 12 Standard drinks or equivalent per week     Comment: during summer. very occasional during the wintwr      Current Outpatient Medications   Medication Sig Dispense Refill    blood glucose monitor strips TEST GLUCOSE ONCE DAILY. Please fill per insurance coverage. 100 strip 3    gemfibrozil (LOPID) 600 MG tablet Take 1 tablet by mouth 2 times daily 180 tablet 1    Blood Glucose Monitoring Suppl (ONE TOUCH ULTRA 2) w/Device KIT 1 kit by Does not apply route daily Use to check blood glucose daily. 1 kit 0    cyclobenzaprine (FLEXERIL) 10 MG tablet TAKE 1 TABLET BY MOUTH TWICE DAILY AS NEEDED 180 tablet 3    SITagliptin (JANUVIA) 50 MG tablet TAKE 1 TABLET BY MOUTH DAILY 30 tablet 11    metFORMIN (GLUCOPHAGE-XR) 500 MG extended release tablet TAKE 2 TABLETS BY MOUTH TWICE DAILY 120 tablet 11    famotidine (PEPCID) 40 MG tablet TAKE 1 TABLET BY MOUTH EVERY DAY 30 tablet 11    Misc. Devices [facility]) MISC Use daily 1 each 0    lisinopril-hydroCHLOROthiazide (PRINZIDE;ZESTORETIC) 20-25 MG per tablet TAKE 1 TABLET BY MOUTH DAILY 90 tablet 3    diclofenac (VOLTAREN) 75 MG EC tablet TAKE 1 TABLET BY MOUTH TWICE DAILY WITH FOOD 180 tablet 3    metoprolol tartrate (LOPRESSOR) 50 MG tablet TAKE 1 TABLET BY MOUTH TWICE DAILY 180 tablet 3    TRULICITY 3.35 CL/8.8LM SOPN INJECT 0.75MG INTO THE SKIN ONCE A WEEK 6 mL 3    Lift Chair MISC by Does not apply route 1 each 0    ONETOUCH DELICA LANCETS FINE MISC TEST THREE TIMES WEEKLY AS DIRECTED 100 each 0    aspirin 81 MG tablet Take 81 mg by mouth daily.  omeprazole (PRILOSEC) 20 MG capsule Take 20 mg by mouth daily. No current facility-administered medications for this visit.       Allergies   Allergen Reactions    Fenofibrate Rash       Health Maintenance   Topic Date Due    COVID-19 Vaccine (1) Never done    Flu vaccine (1) Never done    Hepatitis B vaccine (1 of 3 - Risk 3-dose series) 04/03/2021 (Originally 8/7/1984)    Shingles Vaccine (1 of 2) 04/03/2021 (Originally 8/7/2015)    Diabetic retinal Assessment:       Diagnosis Orders   1. Type 2 diabetes mellitus without complication, without long-term current use of insulin ([facility])  blood glucose monitor strips    Blood Glucose Monitoring Suppl (ONE TOUCH ULTRA 2) w/Device KIT    Hemoglobin A1C   2. Hypertriglyceridemia  gemfibrozil (LOPID) 600 MG tablet    Lipid Panel   3. Essential hypertension     4. Osteoarthritis of lumbar spine, unspecified spinal osteoarthritis complication status     5. Arthralgia of multiple joints     6. Gastroesophageal reflux disease           Plan: Will continue current DM regimen and Gemfibrozil for TG's. Will re-check A1c and LP again in 6 months; PSA and CMP again in 1 year. Return in about 6 months (around 2021) for f/u DM, HLD. Orders Placed This Encounter   Procedures    Hemoglobin A1C     Standing Status:   Future     Standing Expiration Date:   3/9/2022    Lipid Panel     Standing Status:   Future     Standing Expiration Date:   3/9/2022     Order Specific Question:   Is Patient Fasting?/# of Hours     Answer:   12     Orders Placed This Encounter   Medications    blood glucose monitor strips     Sig: TEST GLUCOSE ONCE DAILY. Please fill per insurance coverage. Dispense:  100 strip     Refill:  3    gemfibrozil (LOPID) 600 MG tablet     Sig: Take 1 tablet by mouth 2 times daily     Dispense:  180 tablet     Refill:  1    Blood Glucose Monitoring Suppl (ONE TOUCH ULTRA 2) w/Device KIT     Si kit by Does not apply route daily Use to check blood glucose daily. Dispense:  1 kit     Refill:  0       Patient given educational materials - see patient instructions. Discussed use, benefit, and side effects of prescribed medications. All patient questions answered. Pt voiced understanding. Reviewed health maintenance.             Electronically signed by Jayda Reagan DO on 3/22/2021 at 7:52 AM

## 2021-05-04 ENCOUNTER — OFFICE VISIT (OUTPATIENT)
Dept: PODIATRY | Age: 56
End: 2021-05-04
Payer: COMMERCIAL

## 2021-05-04 VITALS
DIASTOLIC BLOOD PRESSURE: 78 MMHG | SYSTOLIC BLOOD PRESSURE: 128 MMHG | WEIGHT: 284 LBS | HEIGHT: 72 IN | HEART RATE: 84 BPM | BODY MASS INDEX: 38.47 KG/M2

## 2021-05-04 DIAGNOSIS — B35.1 DERMATOPHYTOSIS OF NAIL: ICD-10-CM

## 2021-05-04 DIAGNOSIS — E11.51 CONTROLLED TYPE 2 DM WITH PERIPHERAL CIRCULATORY DISORDER (HCC): Primary | ICD-10-CM

## 2021-05-04 DIAGNOSIS — L97.501 SKIN ULCER OF GREAT TOE, UNSPECIFIED LATERALITY, LIMITED TO BREAKDOWN OF SKIN (HCC): ICD-10-CM

## 2021-05-04 PROCEDURE — 1036F TOBACCO NON-USER: CPT | Performed by: PODIATRIST

## 2021-05-04 PROCEDURE — 11721 DEBRIDE NAIL 6 OR MORE: CPT | Performed by: PODIATRIST

## 2021-05-04 PROCEDURE — 3017F COLORECTAL CA SCREEN DOC REV: CPT | Performed by: PODIATRIST

## 2021-05-04 PROCEDURE — 3044F HG A1C LEVEL LT 7.0%: CPT | Performed by: PODIATRIST

## 2021-05-04 PROCEDURE — G8417 CALC BMI ABV UP PARAM F/U: HCPCS | Performed by: PODIATRIST

## 2021-05-04 PROCEDURE — 2022F DILAT RTA XM EVC RTNOPTHY: CPT | Performed by: PODIATRIST

## 2021-05-04 PROCEDURE — G8427 DOCREV CUR MEDS BY ELIG CLIN: HCPCS | Performed by: PODIATRIST

## 2021-05-04 PROCEDURE — 99213 OFFICE O/P EST LOW 20 MIN: CPT | Performed by: PODIATRIST

## 2021-05-04 NOTE — PATIENT INSTRUCTIONS
Discontinue previous dressing. Apply Silvadene, an antimicrobial cream which is a prescription medicine. Apply once or twice a day to your wound as instructed by your doctor. Cover with a dry dressing. Call with any concerns. SIGNS OF INFECTION  - Redness, swelling, skin hot  - Wound bed turns black or stringy yellow  - Foul odor  - Increased drainage or pus  - Increased pain  - Fever greater than 100F    CALL YOUR DOCTOR OR SEEK MEDICAL ATTENTION IF SIGNS OF INFECTION.   DO NOT WAIT UNTIL YOUR NEXT APPOINTMENT    Call the Wound Care Nurse with any other questions or concerns- [phone]

## 2021-05-04 NOTE — PROGRESS NOTES
Subjective:  Luz Maria Freeman is a 54 y.o. male who presents to the office today for open areas on both big toes. Patient noticed just last week. Patient is been a lot more active and worried he injured his toes. Patient saw some drainage from the right side so use antibiotic ointment. No other treatment tried. No signs of infection seen at home. No pain. Patient also presents for DM foot care. Currently denies F/C/N/V. Allergies   Allergen Reactions    Fenofibrate Rash       Past Medical History:   Diagnosis Date    Arthritis     Carpal tunnel syndrome     Bilateral.    Cerebral palsy (HCC)     Chest pain     Atypical, history of.  Chronic arthralgias of knees and hips     Degenerative disc disease     Lumbar.  Diabetes mellitus, type 2 ([address]     Hip joint valgus deformity     Right hip secondary to muscle imbalance and cerebral palsy.  History of blood transfusion     History of kidney stones     Hypertension     Pyelonephritis 10/14/1988    Reflux     Sleep apnea     Spasticity     Severe, lower extremities, with multiple procedures to the hips and knees.  Ureteral calculi 06/04/1991    Right with obstruction. Prior to Admission medications    Medication Sig Start Date End Date Taking? Authorizing Provider   silver sulfADIAZINE (SILVADENE) 1 % cream Apply topically daily. 5/4/21  Yes Sheng Huntley DPM   blood glucose monitor strips TEST GLUCOSE ONCE DAILY. Please fill per insurance coverage. 3/9/21  Yes Rey Thakkar, DO   gemfibrozil (LOPID) 600 MG tablet Take 1 tablet by mouth 2 times daily 3/9/21  Yes Rey Thakkar, DO   Blood Glucose Monitoring Suppl (ONE TOUCH ULTRA 2) w/Device KIT 1 kit by Does not apply route daily Use to check blood glucose daily.  3/9/21  Yes Gilda Thakkar, DO   cyclobenzaprine (FLEXERIL) 10 MG tablet TAKE 1 TABLET BY MOUTH TWICE DAILY AS NEEDED 2/19/21  Yes Napolean Felty K Black, DO   SITagliptin (JANUVIA) 50 MG tablet TAKE 1 TABLET BY MOUTH DAILY 2/2/21  Yes Nisreen Thakkar, DO   metFORMIN (GLUCOPHAGE-XR) 500 MG extended release tablet TAKE 2 TABLETS BY MOUTH TWICE DAILY 1/21/21  Yes Nisreen Thakkar, DO   famotidine (PEPCID) 40 MG tablet TAKE 1 TABLET BY MOUTH EVERY DAY 12/22/20  Yes Víctor Logan, DO   Misc. Devices [facility]) MISC Use daily 11/23/20  Yes Nisreen Thakkar, DO   lisinopril-hydroCHLOROthiazide (PRINZIDE;ZESTORETIC) 20-25 MG per tablet TAKE 1 TABLET BY MOUTH DAILY 9/1/20  Yes Nisreen Thakkar, DO   diclofenac (VOLTAREN) 75 MG EC tablet TAKE 1 TABLET BY MOUTH TWICE DAILY WITH FOOD 9/1/20  Yes Nisreen Thakkar, DO   metoprolol tartrate (LOPRESSOR) 50 MG tablet TAKE 1 TABLET BY MOUTH TWICE DAILY 7/20/20  Yes Nisreen Thakkar, DO   TRULICITY 9.78 CV/5.9RX SOPN INJECT 0.75MG INTO THE SKIN ONCE A WEEK 6/22/20  Yes Víctor Logan, DO   Lift Chair [address] [facility] by Does not apply route 6/15/20  Yes Nisreen Thakkar, DO   ONETOUCH DELICA LANCETS FINE MISC TEST THREE TIMES WEEKLY AS DIRECTED 3/23/18  Yes Nisreen Thakkar, DO   aspirin 81 MG tablet Take 81 mg by mouth daily. Yes Historical Provider, MD   omeprazole (PRILOSEC) 20 MG capsule Take 20 mg by mouth daily. Yes Historical Provider, MD       Past Surgical History:   Procedure Laterality Date    CARPAL TUNNEL RELEASE Right 09/21/2009    CHOLECYSTECTOMY  09/14/2016    laparscopic w/op grams    COLONOSCOPY  10/1/15    normal    HIP OSTEOTOMY Left 1989    HIP OSTEOTOMY Right 1983    Varus derotation.  LITHOTRIPSY Right 06/04/1991    With cystoscopy and ureteral catheter placement.  LUMBAR FUSION  June 2005    L4-L5.     OTHER SURGICAL HISTORY      pelvic surgery    TOTAL HIP ARTHROPLASTY Left 1999       Family History   Problem Relation Age of Onset    Heart Disease Mother     Heart Disease Father     Heart Attack Brother         age 46    Heart Disease Paternal Uncle        Social History     Tobacco Use    Smoking status: Former Smoker     Types: Cigarettes, Cigars     Quit date: 10/9/1999     Years since quittin.5    Smokeless tobacco: Never Used    Tobacco comment: quit smoking 10/9/1999. Vic Meyer [facility], 2016. Substance Use Topics    Alcohol use: Yes     Types: 12 Standard drinks or equivalent per week     Comment: during summer. very occasional during the wintwr       Review of Systems: All 12 systems reviewed and pertinent positives noted above. Lower Extremity Physical Examination:     Vitals:   Vitals:    21 0805   BP: 128/78   Pulse: 84     General: AAO x 3 in NAD. Vascular: DP and PT pulses palpable 2/4, bilateral.  CFT <3 seconds, bilateral.  Hair growth present to the level of the digits, bilateral.  Edema present, bilateral.  Varicosities present, bilateral. Erythema absent, bilateral. Distal Rubor absent bilateral.  Temperature within normal limits bilateral. Hyperpigmentation present bilateral. Atrophic skin yes. Neurological: Sensation Impaired to light touch to level of digits, bilateral.  Protective sensation intact  0/10 sites via 5.07/10g [place] Monofilament, bilateral.  negative Tinel's, bilateral.  negative Valleix sign, bilateral.  Vibratory abnormal  bilateral.  Reflexes Decreased bilateral.  Paresthesias positive. Dysthesias negative. Sharp/dull abnormal  bilateral.  Musculoskeletal: Muscle strength 4/5, bilateral.  Pain absent upon palpation bilateral. Normal medial longitudinal arch, bilateral.  Ankle ROM decreased,bilateral.  1st MPJ ROM within normal limits, bilateral.  Dorsally contracted digits absent. No other foot deformities. Integument:   Open lesion present distal L hallux and dorsal medial R hallux IPJ. healthy red granular base, no probing no undermining no malodor. No surrounding signs of infx. . Interdigital maceration absent to web spaces absent, Bilateral.  Nails left 1, 2, 3, 4, 5 and right 1, 2, 3, 4, 5 thickened, dystrophic and crumbly, discolored with subungual debris. Fissures absent, Bilateral. Hyperkeratotic tissue is absent. Asessment: Patient is a 54 y.o. male with:    Diagnosis Orders   1. Controlled type 2 DM with peripheral circulatory disorder (HCC)   DIABETES FOOT EXAM    MS DEBRIDEMENT OF NAILS, 6 OR MORE   2. Dermatophytosis of nail  HM DIABETES FOOT EXAM    MS DEBRIDEMENT OF NAILS, 6 OR MORE   3. Skin ulcer of great toe, unspecified laterality, limited to breakdown of skin ([address]   DIABETES FOOT EXAM       Plan: Patient examined and evaluated. Current condition and treatment options discussed in detail. Orders Placed This Encounter   Medications    silver sulfADIAZINE (SILVADENE) 1 % cream     Sig: Apply topically daily. Dispense:  30 g     Refill:  0   dressing: silvadene qd  No debridement needed today. Follow up in 2 weeks for wounds. DM foot ed and exam.    Patient had low level medical decision making this visit. Acute uncomplicated condition. No new testing. Overall low risk of morbidity at this point. All nails as mentioned above debrided in length and thickness. Patient advised OTC methods for treatment of the mycotic nails. Patient will follow up in 10 weeks. Contact office with any questions/problems/concerns.

## 2021-05-04 NOTE — PROGRESS NOTES
Foot Care Worksheet  PCP: Gilberto Puente,   Last visit: 3/9/2021    Nail description:  Thick , Yellow , Crumbly , Marked limitation of ambulation     Pain resulting from thickened and dystrophy of nail plate No    Nails involved  Right   1, 2, 3, 4, 5  (T5-T9)  Left     1, 2, 3, 4, 5  (TA-T4)    Q7 1 Class A Finding - Non traumatic amputation of foot No    Q8 2 Class B Findings - Absent DP pulse No, Absent PT pulse No, Advanced tropic changes (3 required) Yes    Decrease hair growth No, Nail changes/thickening Yes, Pigmented changes/discoloration Yes, Skin texture (thin, shiny) Yes, Skin color (rubor/redness) No    Q9 1 Class B and 2 Class C Findings  Claudication No, Temperature change Yes, Paresthesia Yes, Burning No, Edema Yes

## 2021-06-19 DIAGNOSIS — I10 ESSENTIAL HYPERTENSION: ICD-10-CM

## 2021-06-22 RX ORDER — METOPROLOL TARTRATE 50 MG/1
TABLET, FILM COATED ORAL
Qty: 180 TABLET | Refills: 3 | Status: SHIPPED | OUTPATIENT
Start: 2021-06-22 | End: 2022-06-17

## 2021-06-26 DIAGNOSIS — E11.9 TYPE 2 DIABETES MELLITUS WITHOUT COMPLICATION, WITHOUT LONG-TERM CURRENT USE OF INSULIN (HCC): ICD-10-CM

## 2021-06-29 RX ORDER — DULAGLUTIDE 0.75 MG/.5ML
0.75 INJECTION, SOLUTION SUBCUTANEOUS WEEKLY
Qty: 6 ML | Refills: 3 | Status: SHIPPED | OUTPATIENT
Start: 2021-06-29 | End: 2022-04-04 | Stop reason: SDUPTHER

## 2021-08-18 DIAGNOSIS — M25.50 ARTHRALGIA OF MULTIPLE JOINTS: ICD-10-CM

## 2021-08-18 DIAGNOSIS — I10 ESSENTIAL HYPERTENSION: ICD-10-CM

## 2021-08-18 DIAGNOSIS — M47.816 OSTEOARTHRITIS OF LUMBAR SPINE, UNSPECIFIED SPINAL OSTEOARTHRITIS COMPLICATION STATUS: ICD-10-CM

## 2021-08-21 RX ORDER — DICLOFENAC SODIUM 75 MG/1
TABLET, DELAYED RELEASE ORAL
Qty: 180 TABLET | Refills: 3 | Status: SHIPPED | OUTPATIENT
Start: 2021-08-21 | End: 2022-08-15

## 2021-08-21 RX ORDER — LISINOPRIL AND HYDROCHLOROTHIAZIDE 25; 20 MG/1; MG/1
1 TABLET ORAL DAILY
Qty: 90 TABLET | Refills: 3 | Status: SHIPPED | OUTPATIENT
Start: 2021-08-21 | End: 2022-08-15

## 2021-09-17 ENCOUNTER — HOSPITAL ENCOUNTER (OUTPATIENT)
Dept: LAB | Age: 56
Discharge: HOME | End: 2021-09-17
Payer: COMMERCIAL

## 2021-09-17 ENCOUNTER — VIRTUAL VISIT (OUTPATIENT)
Dept: FAMILY MEDICINE CLINIC | Age: 56
End: 2021-09-17
Payer: COMMERCIAL

## 2021-09-17 ENCOUNTER — PATIENT MESSAGE (OUTPATIENT)
Dept: FAMILY MEDICINE CLINIC | Age: 56
End: 2021-09-17

## 2021-09-17 DIAGNOSIS — K21.9 GASTROESOPHAGEAL REFLUX DISEASE, UNSPECIFIED WHETHER ESOPHAGITIS PRESENT: ICD-10-CM

## 2021-09-17 DIAGNOSIS — E78.1 HYPERTRIGLYCERIDEMIA: ICD-10-CM

## 2021-09-17 DIAGNOSIS — G80.9 CEREBRAL PALSY, UNSPECIFIED TYPE (HCC): ICD-10-CM

## 2021-09-17 DIAGNOSIS — E11.9 TYPE 2 DIABETES MELLITUS WITHOUT COMPLICATION, WITHOUT LONG-TERM CURRENT USE OF INSULIN (HCC): Primary | ICD-10-CM

## 2021-09-17 DIAGNOSIS — I10 ESSENTIAL HYPERTENSION: ICD-10-CM

## 2021-09-17 DIAGNOSIS — E11.9 TYPE 2 DIABETES MELLITUS WITHOUT COMPLICATION, WITHOUT LONG-TERM CURRENT USE OF INSULIN (HCC): ICD-10-CM

## 2021-09-17 DIAGNOSIS — G47.33 OBSTRUCTIVE SLEEP APNEA SYNDROME: ICD-10-CM

## 2021-09-17 LAB
CHOLESTEROL/HDL RATIO: 6.6
CHOLESTEROL: 166 MG/DL
ESTIMATED AVERAGE GLUCOSE: 169 MG/DL
HBA1C MFR BLD: 7.5 % (ref 4–6)
HDLC SERPL-MCNC: 25 MG/DL
LDL CHOLESTEROL: 82 MG/DL (ref 0–130)
TRIGL SERPL-MCNC: 293 MG/DL
VLDLC SERPL CALC-MCNC: ABNORMAL MG/DL (ref 1–30)

## 2021-09-17 PROCEDURE — 3051F HG A1C>EQUAL 7.0%<8.0%: CPT | Performed by: FAMILY MEDICINE

## 2021-09-17 PROCEDURE — 80061 LIPID PANEL: CPT

## 2021-09-17 PROCEDURE — 2022F DILAT RTA XM EVC RTNOPTHY: CPT | Performed by: FAMILY MEDICINE

## 2021-09-17 PROCEDURE — 83036 HEMOGLOBIN GLYCOSYLATED A1C: CPT

## 2021-09-17 PROCEDURE — 36415 COLL VENOUS BLD VENIPUNCTURE: CPT

## 2021-09-17 PROCEDURE — 3017F COLORECTAL CA SCREEN DOC REV: CPT | Performed by: FAMILY MEDICINE

## 2021-09-17 PROCEDURE — 99212 OFFICE O/P EST SF 10 MIN: CPT

## 2021-09-17 PROCEDURE — 99214 OFFICE O/P EST MOD 30 MIN: CPT | Performed by: FAMILY MEDICINE

## 2021-09-17 PROCEDURE — G8427 DOCREV CUR MEDS BY ELIG CLIN: HCPCS | Performed by: FAMILY MEDICINE

## 2021-09-17 ASSESSMENT — PATIENT HEALTH QUESTIONNAIRE - PHQ9
2. FEELING DOWN, DEPRESSED OR HOPELESS: 0
SUM OF ALL RESPONSES TO PHQ QUESTIONS 1-9: 0
SUM OF ALL RESPONSES TO PHQ9 QUESTIONS 1 & 2: 0
1. LITTLE INTEREST OR PLEASURE IN DOING THINGS: 0
SUM OF ALL RESPONSES TO PHQ QUESTIONS 1-9: 0
SUM OF ALL RESPONSES TO PHQ QUESTIONS 1-9: 0

## 2021-09-17 ASSESSMENT — ENCOUNTER SYMPTOMS
BLOOD IN STOOL: 0
SHORTNESS OF BREATH: 0

## 2021-09-17 NOTE — PROGRESS NOTES
2021    TELEHEALTH EVALUATION -- Audio/Visual (During MIZBK-51 public health emergency)    HPI:    Latoya Bravo (:  1965) has requested an audio/video evaluation for the following concern(s):    Pt has been drinking more beer this summer with camping, but has cut back on his sweet beers and liquor with soda. Has been cutting down on breads and not eating seconds as often. Back down into 3XL, instead of 4XL. Taking Januvia 50 mg daily, Metformin XR 0421 mg BID, and Trulicity 1.88 mg once weekly on  for DM - taking compliantly.  Also taking ASA 81 mg nightly. Scheduled in 6 days to have his diabetic DM eye exam in [place].     Taking Gemfibrozil 600 mg BID for hypertriglyceridemia - stable.     Taking Lisinopril-HCTZ 20-25 mg daily and Lopressor 50 mg BID for HTN - stable.        Taking Pepcid 40 mg daily and OTC Prilosec 20 mg nightly - stable; no recent heartburn. Admits that he does eat spicy foods, but trying to cut back on this.      Taking Flexeril 10 mg nightly at bedtime for muscle spasticity; sometimes has to take one during the day as needed, but only rarely.  Also taking Diclofenac 75 mg BID.     Pt using Bi-Pap machine nightly for CHAD - was informed that his machine is under recall. Was told to ask about a In-line bacterial filter. Gets his supplies through [employer]. Settings are 16/8. PATHS[employer]ORS BiPap. Review of Systems   Constitutional: Negative for fever. Respiratory: Negative for shortness of breath. Cardiovascular: Positive for palpitations (occasional fluttering - chronic for pt; will usually clear with cough). Negative for chest pain. Gastrointestinal: Negative for blood in stool. Genitourinary: Negative for dysuria (had some last week, but increased cranberry juice and it resolved) and hematuria. Skin: Negative for rash and wound. Neurological: Negative for dizziness and light-headedness.    Psychiatric/Behavioral: Negative for dysphoric mood. Prior to Visit Medications    Medication Sig Taking? Authorizing Provider   lisinopril-hydroCHLOROthiazide (PRINZIDE;ZESTORETIC) 20-25 MG per tablet TAKE 1 TABLET BY MOUTH DAILY Yes Billy Thakkar, DO   diclofenac (VOLTAREN) 75 MG EC tablet TAKE 1 TABLET BY MOUTH TWICE DAILY WITH FOOD Yes Robin Thakkar, DO   Dulaglutide (TRULICITY) 2.21 HS/1.6UH SOPN Inject 0.75 mg into the skin once a week Yes Billy Thakkar, DO   metoprolol tartrate (LOPRESSOR) 50 MG tablet TAKE 1 TABLET BY MOUTH TWICE DAILY Yes Gilda Thakkar, DO   blood glucose monitor strips TEST GLUCOSE ONCE DAILY. Please fill per insurance coverage. Yes Billy Thakkar, DO   gemfibrozil (LOPID) 600 MG tablet Take 1 tablet by mouth 2 times daily Yes Billy Thakkar DO   cyclobenzaprine (FLEXERIL) 10 MG tablet TAKE 1 TABLET BY MOUTH TWICE DAILY AS NEEDED Yes Robin Thakkar, DO   SITagliptin (JANUVIA) 50 MG tablet TAKE 1 TABLET BY MOUTH DAILY Yes Robin Thakkar, DO   metFORMIN (GLUCOPHAGE-XR) 500 MG extended release tablet TAKE 2 TABLETS BY MOUTH TWICE DAILY Yes Robin Thakkar, DO   famotidine (PEPCID) 40 MG tablet TAKE 1 TABLET BY MOUTH EVERY DAY Yes Ellen Bull, DO   Misc. Devices [facility]) MISC Use daily Yes Billy Thakkar, DO   ONETOUCH DELICA LANCETS FINE MISC TEST THREE TIMES WEEKLY AS DIRECTED Yes Billy Thakkar DO   aspirin 81 MG tablet Take 81 mg by mouth daily. Yes Historical Provider, MD   omeprazole (PRILOSEC) 20 MG capsule Take 20 mg by mouth daily. Yes Historical Provider, MD       Social History     Tobacco Use    Smoking status: Former Smoker     Types: Cigarettes, Cigars     Quit date: 10/9/1999     Years since quittin.9    Smokeless tobacco: Never Used    Tobacco comment: quit smoking 10/9/1999. Juvencio Philip RCP, 2016. Substance Use Topics    Alcohol use: Yes     Types: 12 Standard drinks or equivalent per week     Comment: during summer.   very occasional during the wintwr    Drug use: No        Allergies Allergen Reactions    Fenofibrate Rash   ,   Past Medical History:   Diagnosis Date    Arthritis     Carpal tunnel syndrome     Bilateral.    Cerebral palsy (HCC)     Chest pain     Atypical, history of.  Chronic arthralgias of knees and hips     Degenerative disc disease     Lumbar.  Diabetes mellitus, type 2 ([address]     Hip joint valgus deformity     Right hip secondary to muscle imbalance and cerebral palsy.  History of blood transfusion     History of kidney stones     Hypertension     Pyelonephritis 10/14/1988    Reflux     Sleep apnea     Spasticity     Severe, lower extremities, with multiple procedures to the hips and knees.  Ureteral calculi 06/04/1991    Right with obstruction. ,   Past Surgical History:   Procedure Laterality Date    CARPAL TUNNEL RELEASE Right 09/21/2009    CHOLECYSTECTOMY  09/14/2016    laparscopic w/op grams    COLONOSCOPY  10/1/15    normal    HIP OSTEOTOMY Left 1989    HIP OSTEOTOMY Right 1983    Varus derotation.  LITHOTRIPSY Right 06/04/1991    With cystoscopy and ureteral catheter placement.  LUMBAR FUSION  June 2005    L4-L5.  OTHER SURGICAL HISTORY      pelvic surgery    TOTAL HIP ARTHROPLASTY Left 1999       PHYSICAL EXAMINATION:  [ INSTRUCTIONS:  \"[x]\" Indicates a positive item  \"[]\" Indicates a negative item  -- DELETE ALL ITEMS NOT EXAMINED]  Vital Signs: (As obtained by patient/caregiver or practitioner observation)    Blood pressure-  Heart rate-    Respiratory rate-    Temperature-  Pulse oximetry-     Constitutional: [] Appears well-developed and well-nourished [] No apparent distress      [] Abnormal-   Mental status  [] Alert and awake  [] Oriented to person/place/time []Able to follow commands      Eyes:  EOM    []  Normal  [] Abnormal-  Sclera  []  Normal  [] Abnormal -         Discharge []  None visible  [] Abnormal -    HENT:   [] Normocephalic, atraumatic.   [] Abnormal   [] Mouth/Throat: Mucous membranes are moist. External Ears [] Normal  [] Abnormal-     Neck: [] No visualized mass     Pulmonary/Chest: [] Respiratory effort normal.  [] No visualized signs of difficulty breathing or respiratory distress        [] Abnormal-      Musculoskeletal:   [] Normal gait with no signs of ataxia         [] Normal range of motion of neck        [] Abnormal-       Neurological:        [] No Facial Asymmetry (Cranial nerve 7 motor function) (limited exam to video visit)          [] No gaze palsy        [] Abnormal-         Skin:        [] No significant exanthematous lesions or discoloration noted on facial skin         [] Abnormal-            Psychiatric:       [] Normal Affect [] No Hallucinations        [] Abnormal-     Other pertinent observable physical exam findings-     ASSESSMENT/PLAN:  1. Type 2 diabetes mellitus without complication, without long-term current use of insulin ([facility]    2. Essential hypertension    3. Hypertriglyceridemia    4. Obstructive sleep apnea syndrome    5. Gastroesophageal reflux disease, unspecified whether esophagitis present    6. Cerebral palsy, unspecified type ([facility]        Declined Covid vaccine. Return in about 26 weeks (around 3/18/2022) for f/u OV at 9:00 am for MAW visit and f/u DM, HTN. Niru Her, was evaluated through a synchronous (real-time) audio-video encounter. The patient (or guardian if applicable) is aware that this is a billable service. Verbal consent to proceed has been obtained within the past 12 months. The visit was conducted pursuant to the emergency declaration under the [address] [place] [address] authority and the [employer] General Act. Patient identification was verified, and a caregiver was present when appropriate. The patient was located in a state where the provider was credentialed to provide care.     Total time spent on this encounter: Not billed by time    --Delbert Urbina DO Bijan on 9/26/2021 at 11:45 PM    An electronic signature was used to authenticate this note.

## 2021-09-20 DIAGNOSIS — E11.9 TYPE 2 DIABETES MELLITUS WITHOUT COMPLICATION, WITHOUT LONG-TERM CURRENT USE OF INSULIN (HCC): Primary | ICD-10-CM

## 2021-09-20 DIAGNOSIS — E78.1 HYPERTRIGLYCERIDEMIA: ICD-10-CM

## 2021-09-20 NOTE — TELEPHONE ENCOUNTER
From: Elham Rodriguez  To: Renetta Salinas DO  Sent: 9/17/2021 4:49 PM EDT  Subject: Test Results Question    My blood pressure is 127 over 83.  Pulse is 103

## 2021-09-22 ENCOUNTER — OFFICE VISIT (OUTPATIENT)
Dept: PODIATRY | Age: 56
End: 2021-09-22
Payer: COMMERCIAL

## 2021-09-22 VITALS — HEART RATE: 68 BPM | SYSTOLIC BLOOD PRESSURE: 124 MMHG | DIASTOLIC BLOOD PRESSURE: 70 MMHG | RESPIRATION RATE: 20 BRPM

## 2021-09-22 DIAGNOSIS — E11.51 CONTROLLED TYPE 2 DM WITH PERIPHERAL CIRCULATORY DISORDER (HCC): Primary | ICD-10-CM

## 2021-09-22 DIAGNOSIS — B35.1 DERMATOPHYTOSIS OF NAIL: ICD-10-CM

## 2021-09-22 PROCEDURE — 99999 PR OFFICE/OUTPT VISIT,PROCEDURE ONLY: CPT | Performed by: PODIATRIST

## 2021-09-22 PROCEDURE — 11721 DEBRIDE NAIL 6 OR MORE: CPT | Performed by: PODIATRIST

## 2021-09-22 NOTE — PROGRESS NOTES
Foot Care Worksheet  PCP: Sherry Hinojosa DO  Last visit:  03 / 09 / 2021    Nail description:  Thick , Yellow , Crumbly , Marked limitation of ambulation     Pain resulting from thickened and dystrophy of nail plate No    Nails involved  Right   1, 2, 3, 4, 5  (T5-T9)  Left     1, 2, 3, 4, 5  (TA-T4)    Q7 1 Class A Finding - Non traumatic amputation of foot No    Q8 2 Class B Findings - Absent DP pulse No, Absent PT pulse No, Advanced tropic changes (3 required) Yes    Decrease hair growth No, Nail changes/thickening Yes, Pigmented changes/discoloration Yes, Skin texture (thin, shiny) Yes, Skin color (rubor/redness) No    Q9 1 Class B and 2 Class C Findings  Claudication No, Temperature change Yes, Paresthesia Yes, Burning No, Edema Yes

## 2021-09-22 NOTE — PROGRESS NOTES
Subjective:  Monster Oropeza is a 64 y.o. male who presents to the office today for routine DM foot care. Currently denies F/C/N/V. Allergies   Allergen Reactions    Fenofibrate Rash       Past Medical History:   Diagnosis Date    Arthritis     Carpal tunnel syndrome     Bilateral.    Cerebral palsy (HCC)     Chest pain     Atypical, history of.  Chronic arthralgias of knees and hips     Degenerative disc disease     Lumbar.  Diabetes mellitus, type 2 ([address]     Hip joint valgus deformity     Right hip secondary to muscle imbalance and cerebral palsy.  History of blood transfusion     History of kidney stones     Hypertension     Pyelonephritis 10/14/1988    Reflux     Sleep apnea     Spasticity     Severe, lower extremities, with multiple procedures to the hips and knees.  Ureteral calculi 06/04/1991    Right with obstruction. Prior to Admission medications    Medication Sig Start Date End Date Taking? Authorizing Provider   lisinopril-hydroCHLOROthiazide (PRINZIDE;ZESTORETIC) 20-25 MG per tablet TAKE 1 TABLET BY MOUTH DAILY 8/21/21  Yes Daja Thakkar, DO   diclofenac (VOLTAREN) 75 MG EC tablet TAKE 1 TABLET BY MOUTH TWICE DAILY WITH FOOD 8/21/21  Yes Daja Thakkar, DO   Dulaglutide (TRULICITY) 2.92 ES/1.3OR SOPN Inject 0.75 mg into the skin once a week 6/29/21  Yes Daja Thakkar, DO   metoprolol tartrate (LOPRESSOR) 50 MG tablet TAKE 1 TABLET BY MOUTH TWICE DAILY 6/22/21  Yes Daja Thakkar, DO   blood glucose monitor strips TEST GLUCOSE ONCE DAILY. Please fill per insurance coverage.  3/9/21  Yes Daja Thakkar, DO   gemfibrozil (LOPID) 600 MG tablet Take 1 tablet by mouth 2 times daily 3/9/21  Yes Daja Thakkar, DO   cyclobenzaprine (FLEXERIL) 10 MG tablet TAKE 1 TABLET BY MOUTH TWICE DAILY AS NEEDED 2/19/21  Yes Daja Thakkar, DO   SITagliptin (JANUVIA) 50 MG tablet TAKE 1 TABLET BY MOUTH DAILY 2/2/21  Yes Daja Thakkar, DO   metFORMIN (GLUCOPHAGE-XR) 500 MG extended release tablet TAKE 2 TABLETS BY MOUTH TWICE DAILY 21  Yes Michelle Thakkar, DO   famotidine (PEPCID) 40 MG tablet TAKE 1 TABLET BY MOUTH EVERY DAY 20  Yes Eileen Beauchamp, DO   Misc. Devices [facility]) MISC Use daily 20  Yes Michelle Thakkar, DO   ONETOUCH DELICA LANCETS FINE MISC TEST THREE TIMES WEEKLY AS DIRECTED 3/23/18  Yes Michelle Thakkar, DO   aspirin 81 MG tablet Take 81 mg by mouth daily. Yes Historical Provider, MD   omeprazole (PRILOSEC) 20 MG capsule Take 20 mg by mouth daily. Yes Historical Provider, MD       Past Surgical History:   Procedure Laterality Date    CARPAL TUNNEL RELEASE Right 2009    CHOLECYSTECTOMY  2016    laparscopic w/op grams    COLONOSCOPY  10/1/15    normal    HIP OSTEOTOMY Left     HIP OSTEOTOMY Right     Varus derotation.  LITHOTRIPSY Right 1991    With cystoscopy and ureteral catheter placement.  LUMBAR FUSION  2005    L4-L5.  OTHER SURGICAL HISTORY      pelvic surgery    TOTAL HIP ARTHROPLASTY Left        Family History   Problem Relation Age of Onset    Heart Disease Mother     Heart Disease Father     Heart Attack Brother         age 46    Heart Disease Paternal Uncle        Social History     Tobacco Use    Smoking status: Former Smoker     Types: Cigarettes, Cigars     Quit date: 10/9/1999     Years since quittin.9    Smokeless tobacco: Never Used    Tobacco comment: quit smoking 10/9/1999. Naya Vaughan [facility], 2016. Substance Use Topics    Alcohol use: Yes     Types: 12 Standard drinks or equivalent per week     Comment: during summer. very occasional during the wintwr       Review of Systems: All 12 systems reviewed and pertinent positives noted above. Lower Extremity Physical Examination:     Vitals:   Vitals:    21 0756   BP: 124/70   Pulse: 68   Resp: 20     General: AAO x 3 in NAD.      Vascular: DP and PT pulses palpable 2/4, bilateral.  CFT <3 seconds, bilateral.  Hair growth present to the level of the digits, bilateral.  Edema present, bilateral.  Varicosities present, bilateral. Erythema absent, bilateral. Distal Rubor absent bilateral.  Temperature within normal limits bilateral. Hyperpigmentation present bilateral. Atrophic skin yes. Neurological: Sensation Impaired to light touch to level of digits, bilateral.  Protective sensation intact  0/10 sites via 5.07/10g [place]-Aj Monofilament, bilateral.  negative Tinel's, bilateral.  negative Valleix sign, bilateral.  Vibratory abnormal  bilateral.  Reflexes Decreased bilateral.  Paresthesias positive. Dysthesias negative. Sharp/dull abnormal  bilateral.  Musculoskeletal: Muscle strength 4/5, bilateral.  Pain absent upon palpation bilateral. Normal medial longitudinal arch, bilateral.  Ankle ROM decreased,bilateral.  1st MPJ ROM within normal limits, bilateral.  Dorsally contracted digits absent. No other foot deformities. Integument:   Open lesion present distal L hallux. Interdigital maceration absent to web spaces absent, Bilateral.  Nails left 1, 2, 3, 4, 5 and right 1, 2, 3, 4, 5 thickened, dystrophic and crumbly, discolored with subungual debris. Fissures absent, Bilateral. Hyperkeratotic tissue is absent. Asessment: Patient is a 64 y.o. male with:    Diagnosis Orders   1. Controlled type 2 DM with peripheral circulatory disorder ([address]     2. Dermatophytosis of nail         Plan: Patient examined and evaluated. Current condition and treatment options discussed in detail. DM foot ed and exam.    All nails as mentioned above debrided in length and thickness. Patient advised OTC methods for treatment of the mycotic nails. Patient will follow up in 10 weeks. Contact office with any questions/problems/concerns.

## 2021-11-16 DIAGNOSIS — E78.1 HYPERTRIGLYCERIDEMIA: ICD-10-CM

## 2021-11-16 NOTE — TELEPHONE ENCOUNTER
[facility] called requesting a refill of the below medication which has been pended for you:     Requested Prescriptions     Pending Prescriptions Disp Refills    gemfibrozil (LOPID) 600 MG tablet [Pharmacy Med Name: GEMFIBROZIL 600MG TABLETS] 180 tablet 1     Sig: TAKE 1 TABLET BY MOUTH TWICE DAILY       Last Appointment Date: 9/17/2021  Next Appointment Date: 3/18/2022    Allergies   Allergen Reactions    Fenofibrate Rash

## 2021-11-18 RX ORDER — GEMFIBROZIL 600 MG/1
TABLET, FILM COATED ORAL
Qty: 180 TABLET | Refills: 1 | Status: SHIPPED | OUTPATIENT
Start: 2021-11-18 | End: 2022-04-04 | Stop reason: SDUPTHER

## 2021-12-01 ENCOUNTER — OFFICE VISIT (OUTPATIENT)
Dept: PODIATRY | Age: 56
End: 2021-12-01
Payer: COMMERCIAL

## 2021-12-01 VITALS — SYSTOLIC BLOOD PRESSURE: 126 MMHG | HEART RATE: 88 BPM | DIASTOLIC BLOOD PRESSURE: 74 MMHG | RESPIRATION RATE: 20 BRPM

## 2021-12-01 DIAGNOSIS — E11.51 CONTROLLED TYPE 2 DM WITH PERIPHERAL CIRCULATORY DISORDER (HCC): Primary | ICD-10-CM

## 2021-12-01 DIAGNOSIS — B35.1 DERMATOPHYTOSIS OF NAIL: ICD-10-CM

## 2021-12-01 PROCEDURE — 11721 DEBRIDE NAIL 6 OR MORE: CPT | Performed by: PODIATRIST

## 2021-12-01 PROCEDURE — 99999 PR OFFICE/OUTPT VISIT,PROCEDURE ONLY: CPT | Performed by: PODIATRIST

## 2021-12-01 NOTE — PROGRESS NOTES
Foot Care Worksheet  PCP: Jeffrey Sorto DO  Last visit:  03 / 09 / 2021    Nail description:  Thick , Yellow , Crumbly , Marked limitation of ambulation     Pain resulting from thickened and dystrophy of nail plate No    Nails involved  Right   1, 2, 3, 4, 5  (T5-T9)  Left     1, 2, 3, 4, 5  (TA-T4)    Q7 1 Class A Finding - Non traumatic amputation of foot No    Q8 2 Class B Findings - Absent DP pulse No, Absent PT pulse No, Advanced tropic changes (3 required) Yes    Decrease hair growth No, Nail changes/thickening Yes, Pigmented changes/discoloration Yes, Skin texture (thin, shiny) Yes, Skin color (rubor/redness) No    Q9 1 Class B and 2 Class C Findings  Claudication No, Temperature change Yes, Paresthesia Yes, Burning No, Edema Yes

## 2021-12-01 NOTE — PROGRESS NOTES
Subjective:  Karin Hardy is a 64 y.o. male who presents to the office today for routine DM foot care. Currently denies F/C/N/V. Allergies   Allergen Reactions    Fenofibrate Rash       Past Medical History:   Diagnosis Date    Arthritis     Carpal tunnel syndrome     Bilateral.    Cerebral palsy (HCC)     Chest pain     Atypical, history of.  Chronic arthralgias of knees and hips     Degenerative disc disease     Lumbar.  Diabetes mellitus, type 2 ([address]     Hip joint valgus deformity     Right hip secondary to muscle imbalance and cerebral palsy.  History of blood transfusion     History of kidney stones     Hypertension     Pyelonephritis 10/14/1988    Reflux     Sleep apnea     Spasticity     Severe, lower extremities, with multiple procedures to the hips and knees.  Ureteral calculi 06/04/1991    Right with obstruction. Prior to Admission medications    Medication Sig Start Date End Date Taking? Authorizing Provider   gemfibrozil (LOPID) 600 MG tablet TAKE 1 TABLET BY MOUTH TWICE DAILY 11/18/21  Yes Diamond Thakkar, DO   lisinopril-hydroCHLOROthiazide (PRINZIDE;ZESTORETIC) 20-25 MG per tablet TAKE 1 TABLET BY MOUTH DAILY 8/21/21  Yes Diamond Thakkar, DO   diclofenac (VOLTAREN) 75 MG EC tablet TAKE 1 TABLET BY MOUTH TWICE DAILY WITH FOOD 8/21/21  Yes Diamond Thakkar, DO   Dulaglutide (TRULICITY) 7.33 SV/0.2NL SOPN Inject 0.75 mg into the skin once a week 6/29/21  Yes Diamond Thakkar, DO   metoprolol tartrate (LOPRESSOR) 50 MG tablet TAKE 1 TABLET BY MOUTH TWICE DAILY 6/22/21  Yes Diamond Thakkar, DO   blood glucose monitor strips TEST GLUCOSE ONCE DAILY. Please fill per insurance coverage.  3/9/21  Yes Gilda Thakkar, DO   cyclobenzaprine (FLEXERIL) 10 MG tablet TAKE 1 TABLET BY MOUTH TWICE DAILY AS NEEDED 2/19/21  Yes Diamond Thakkar, DO   SITagliptin (JANUVIA) 50 MG tablet TAKE 1 TABLET BY MOUTH DAILY 2/2/21  Yes Diamond Thakkar, DO   metFORMIN (GLUCOPHAGE-XR) 500 MG extended release tablet TAKE 2 TABLETS BY MOUTH TWICE DAILY 21  Yes Lorice Dies Black, DO   famotidine (PEPCID) 40 MG tablet TAKE 1 TABLET BY MOUTH EVERY DAY 20  Yes Rosa Elena Lo, DO   Misc. Devices [facility]) MISC Use daily 20  Yes Lorice Dies Black, DO   ONETOUCH DELICA LANCETS FINE MISC TEST THREE TIMES WEEKLY AS DIRECTED 3/23/18  Yes Lorbenjamin Dies Black, DO   aspirin 81 MG tablet Take 81 mg by mouth daily. Yes Historical Provider, MD   omeprazole (PRILOSEC) 20 MG capsule Take 20 mg by mouth daily. Yes Historical Provider, MD       Past Surgical History:   Procedure Laterality Date    CARPAL TUNNEL RELEASE Right 2009    CHOLECYSTECTOMY  2016    laparscopic w/op grams    COLONOSCOPY  10/1/15    normal    HIP OSTEOTOMY Left     HIP OSTEOTOMY Right     Varus derotation.  LITHOTRIPSY Right 1991    With cystoscopy and ureteral catheter placement.  LUMBAR FUSION  2005    L4-L5.  OTHER SURGICAL HISTORY      pelvic surgery    TOTAL HIP ARTHROPLASTY Left        Family History   Problem Relation Age of Onset    Heart Disease Mother     Heart Disease Father     Heart Attack Brother         age 46    Heart Disease Paternal Uncle        Social History     Tobacco Use    Smoking status: Former Smoker     Types: Cigarettes, Cigars     Quit date: 10/9/1999     Years since quittin.1    Smokeless tobacco: Never Used    Tobacco comment: quit smoking 10/9/1999. Ilan Guzman [facility], 2016. Substance Use Topics    Alcohol use: Yes     Types: 12 Standard drinks or equivalent per week     Comment: during summer. very occasional during the wintwr       Review of Systems: All 12 systems reviewed and pertinent positives noted above. Lower Extremity Physical Examination:     Vitals:   Vitals:    21 0751   BP: 126/74   Pulse: 88   Resp: 20     General: AAO x 3 in NAD.      Vascular: DP and PT pulses palpable 2/4, bilateral.  CFT <3 seconds, bilateral.  Hair growth present to the level of the digits, bilateral.  Edema present, bilateral.  Varicosities present, bilateral. Erythema absent, bilateral. Distal Rubor absent bilateral.  Temperature within normal limits bilateral. Hyperpigmentation present bilateral. Atrophic skin yes. Neurological: Sensation Impaired to light touch to level of digits, bilateral.  Protective sensation intact  0/10 sites via 5.07/10g [place]-Aj Monofilament, bilateral.  negative Tinel's, bilateral.  negative Valleix sign, bilateral.  Vibratory abnormal  bilateral.  Reflexes Decreased bilateral.  Paresthesias positive. Dysthesias negative. Sharp/dull abnormal  bilateral.  Musculoskeletal: Muscle strength 4/5, bilateral.  Pain absent upon palpation bilateral. Normal medial longitudinal arch, bilateral.  Ankle ROM decreased,bilateral.  1st MPJ ROM within normal limits, bilateral.  Dorsally contracted digits absent. No other foot deformities. Integument:   Open lesion present distal L hallux. Interdigital maceration absent to web spaces absent, Bilateral.  Nails left 1, 2, 3, 4, 5 and right 1, 2, 3, 4, 5 thickened, dystrophic and crumbly, discolored with subungual debris. Fissures absent, Bilateral. Hyperkeratotic tissue is absent. Asessment: Patient is a 64 y.o. male with:    Diagnosis Orders   1. Controlled type 2 DM with peripheral circulatory disorder ([address]     2. Dermatophytosis of nail         Plan: Patient examined and evaluated. Current condition and treatment options discussed in detail. DM foot ed and exam.    All nails as mentioned above debrided in length and thickness. Patient advised OTC methods for treatment of the mycotic nails. Patient will follow up in 10 weeks. Contact office with any questions/problems/concerns.

## 2021-12-16 DIAGNOSIS — K21.9 GASTROESOPHAGEAL REFLUX DISEASE: ICD-10-CM

## 2021-12-16 RX ORDER — FAMOTIDINE 40 MG/1
20-40 TABLET, FILM COATED ORAL DAILY
Qty: 90 TABLET | Refills: 3 | Status: SHIPPED | OUTPATIENT
Start: 2021-12-16 | End: 2022-04-04

## 2021-12-16 NOTE — TELEPHONE ENCOUNTER
Please inform pt that the recommended long-term dose of this med is only 20 mg daily. Could he try the lower dose to see if he sx's remain controlled? I refilled for higher dose (40 mg tabs), but he could try cutting in 1/2.

## 2021-12-16 NOTE — TELEPHONE ENCOUNTER
Spoke to patient. Patient states that he will start cutting them in half to see if his symptoms are controlled. Patient verbalized he will notify the office if symptoms are not controlled.

## 2021-12-16 NOTE — TELEPHONE ENCOUNTER
[facility] called requesting a refill of the below medication which has been pended for you:     Requested Prescriptions     Pending Prescriptions Disp Refills    famotidine (PEPCID) 40 MG tablet [Pharmacy Med Name: FAMOTIDINE 40MG TABLETS] 30 tablet 11     Sig: TAKE 1 TABLET BY MOUTH EVERY DAY       Last Appointment Date: 9/17/2021  Next Appointment Date: 3/18/2022    Allergies   Allergen Reactions    Fenofibrate Rash

## 2022-01-29 DIAGNOSIS — E11.9 TYPE 2 DIABETES MELLITUS WITHOUT COMPLICATION, WITHOUT LONG-TERM CURRENT USE OF INSULIN (HCC): ICD-10-CM

## 2022-01-31 RX ORDER — METFORMIN HYDROCHLORIDE 500 MG/1
TABLET, EXTENDED RELEASE ORAL
Qty: 360 TABLET | Refills: 3 | Status: SHIPPED | OUTPATIENT
Start: 2022-01-31

## 2022-01-31 NOTE — TELEPHONE ENCOUNTER
[facility] called requesting a refill of the below medication which has been pended for you:     Requested Prescriptions     Pending Prescriptions Disp Refills    metFORMIN (GLUCOPHAGE-XR) 500 MG extended release tablet [Pharmacy Med Name: METFORMIN ER 500MG 24HR TABS] 120 tablet 11     Sig: TAKE 2 TABLETS BY MOUTH TWICE DAILY       Last Appointment Date: 9/17/2021  Next Appointment Date: 3/18/2022    Allergies   Allergen Reactions    Fenofibrate Rash

## 2022-02-09 ENCOUNTER — OFFICE VISIT (OUTPATIENT)
Dept: PODIATRY | Age: 57
End: 2022-02-09
Payer: COMMERCIAL

## 2022-02-09 VITALS — DIASTOLIC BLOOD PRESSURE: 80 MMHG | RESPIRATION RATE: 20 BRPM | HEART RATE: 76 BPM | SYSTOLIC BLOOD PRESSURE: 128 MMHG

## 2022-02-09 DIAGNOSIS — E11.51 CONTROLLED TYPE 2 DM WITH PERIPHERAL CIRCULATORY DISORDER (HCC): Primary | ICD-10-CM

## 2022-02-09 DIAGNOSIS — B35.1 DERMATOPHYTOSIS OF NAIL: ICD-10-CM

## 2022-02-09 PROCEDURE — 99999 PR OFFICE/OUTPT VISIT,PROCEDURE ONLY: CPT | Performed by: PODIATRIST

## 2022-02-09 PROCEDURE — 11721 DEBRIDE NAIL 6 OR MORE: CPT | Performed by: PODIATRIST

## 2022-02-09 NOTE — PROGRESS NOTES
Foot Care Worksheet  PCP: Kike Valdez DO  Last visit:  09 / 17 / 2021    Nail description:  Thick , Yellow , Crumbly , Marked limitation of ambulation     Pain resulting from thickened and dystrophy of nail plate No    Nails involved  Right   1, 2, 3, 4, 5  (T5-T9)  Left     1, 2, 3, 4, 5  (TA-T4)    Q7 1 Class A Finding - Non traumatic amputation of foot No    Q8 2 Class B Findings - Absent DP pulse No, Absent PT pulse No, Advanced tropic changes (3 required) Yes    Decrease hair growth No, Nail changes/thickening Yes, Pigmented changes/discoloration Yes, Skin texture (thin, shiny) Yes, Skin color (rubor/redness) No    Q9 1 Class B and 2 Class C Findings  Claudication No, Temperature change Yes, Paresthesia Yes, Burning No, Edema Yes

## 2022-02-09 NOTE — PROGRESS NOTES
Subjective:  Beverley Ziegler is a 64 y.o. male who presents to the office today for routine DM foot care. Currently denies F/C/N/V. Allergies   Allergen Reactions    Fenofibrate Rash       Past Medical History:   Diagnosis Date    Arthritis     Carpal tunnel syndrome     Bilateral.    Cerebral palsy (HCC)     Chest pain     Atypical, history of.  Chronic arthralgias of knees and hips     Degenerative disc disease     Lumbar.  Diabetes mellitus, type 2 ([address]     Hip joint valgus deformity     Right hip secondary to muscle imbalance and cerebral palsy.  History of blood transfusion     History of kidney stones     Hypertension     Pyelonephritis 10/14/1988    Reflux     Sleep apnea     Spasticity     Severe, lower extremities, with multiple procedures to the hips and knees.  Ureteral calculi 06/04/1991    Right with obstruction. Prior to Admission medications    Medication Sig Start Date End Date Taking? Authorizing Provider   metFORMIN (GLUCOPHAGE-XR) 500 MG extended release tablet TAKE 2 TABLETS BY MOUTH TWICE DAILY 1/31/22  Yes Cleatis Blush Black, DO   gemfibrozil (LOPID) 600 MG tablet TAKE 1 TABLET BY MOUTH TWICE DAILY 11/18/21  Yes Cleatis Blush Black, DO   lisinopril-hydroCHLOROthiazide (PRINZIDE;ZESTORETIC) 20-25 MG per tablet TAKE 1 TABLET BY MOUTH DAILY 8/21/21  Yes Cleatis Blush Black, DO   diclofenac (VOLTAREN) 75 MG EC tablet TAKE 1 TABLET BY MOUTH TWICE DAILY WITH FOOD 8/21/21  Yes Cleatis Blush Black, DO   Dulaglutide (TRULICITY) 3.81 VF/6.6WW SOPN Inject 0.75 mg into the skin once a week 6/29/21  Yes Cleatis Blush Black, DO   metoprolol tartrate (LOPRESSOR) 50 MG tablet TAKE 1 TABLET BY MOUTH TWICE DAILY 6/22/21  Yes Cleatis Blush Black, DO   blood glucose monitor strips TEST GLUCOSE ONCE DAILY. Please fill per insurance coverage.  3/9/21  Yes Cleatis Blush Black, DO   cyclobenzaprine (FLEXERIL) 10 MG tablet TAKE 1 TABLET BY MOUTH TWICE DAILY AS NEEDED 2/19/21  Yes Cleatis Blush Black, DO   SITagliptin (Isaac [place]) 50 MG tablet TAKE 1 TABLET BY MOUTH DAILY 21  Yes Ananya Gabriel, DO   Misc. Devices [facility]) MISC Use daily 20  Yes Marybeth Counts Black, DO   ONETOUCH DELICA LANCETS FINE MISC TEST THREE TIMES WEEKLY AS DIRECTED 3/23/18  Yes Marybeth Counts Black, DO   aspirin 81 MG tablet Take 81 mg by mouth daily. Yes Historical Provider, MD   omeprazole (PRILOSEC) 20 MG capsule Take 20 mg by mouth daily. Yes Historical Provider, MD   famotidine (PEPCID) 40 MG tablet Take 0.5-1 tablets by mouth daily  Patient not taking: Reported on 21   Ananya Gabriel DO       Past Surgical History:   Procedure Laterality Date    CARPAL TUNNEL RELEASE Right 2009    CHOLECYSTECTOMY  2016    laparscopic w/op grams    COLONOSCOPY  10/1/15    normal    HIP OSTEOTOMY Left     HIP OSTEOTOMY Right     Varus derotation.  LITHOTRIPSY Right 1991    With cystoscopy and ureteral catheter placement.  LUMBAR FUSION  2005    L4-L5.  OTHER SURGICAL HISTORY      pelvic surgery    TOTAL HIP ARTHROPLASTY Left        Family History   Problem Relation Age of Onset    Heart Disease Mother     Heart Disease Father     Heart Attack Brother         age 46    Heart Disease Paternal Uncle        Social History     Tobacco Use    Smoking status: Former Smoker     Types: Cigarettes, Cigars     Quit date: 10/9/1999     Years since quittin.3    Smokeless tobacco: Never Used    Tobacco comment: quit smoking 10/9/1999. [facility], 2016. Substance Use Topics    Alcohol use: Yes     Types: 12 Standard drinks or equivalent per week     Comment: during summer. very occasional during the wint       Review of Systems: All 12 systems reviewed and pertinent positives noted above. Lower Extremity Physical Examination:     Vitals:   Vitals:    22 0811   BP: 128/80   Pulse: 76   Resp: 20     General: AAO x 3 in NAD.      Vascular: DP and PT pulses palpable 2/4, bilateral.  CFT <3 seconds, bilateral.  Hair growth present to the level of the digits, bilateral.  Edema present, bilateral.  Varicosities present, bilateral. Erythema absent, bilateral. Distal Rubor absent bilateral.  Temperature within normal limits bilateral. Hyperpigmentation present bilateral. Atrophic skin yes. Neurological: Sensation Impaired to light touch to level of digits, bilateral.  Protective sensation intact  0/10 sites via 5.07/10g [place]-Aj Monofilament, bilateral.  negative Tinel's, bilateral.  negative Valleix sign, bilateral.  Vibratory abnormal  bilateral.  Reflexes Decreased bilateral.  Paresthesias positive. Dysthesias negative. Sharp/dull abnormal  bilateral.  Musculoskeletal: Muscle strength 4/5, bilateral.  Pain absent upon palpation bilateral. Normal medial longitudinal arch, bilateral.  Ankle ROM decreased,bilateral.  1st MPJ ROM within normal limits, bilateral.  Dorsally contracted digits absent. No other foot deformities. Integument:   Open lesion present distal L hallux. Interdigital maceration absent to web spaces absent, Bilateral.  Nails left 1, 2, 3, 4, 5 and right 1, 2, 3, 4, 5 thickened, dystrophic and crumbly, discolored with subungual debris. Fissures absent, Bilateral. Hyperkeratotic tissue is absent. Asessment: Patient is a 64 y.o. male with:    Diagnosis Orders   1. Controlled type 2 DM with peripheral circulatory disorder ([address]     2. Dermatophytosis of nail         Plan: Patient examined and evaluated. Current condition and treatment options discussed in detail. DM foot ed and exam.    All nails as mentioned above debrided in length and thickness. Patient advised OTC methods for treatment of the mycotic nails. Patient will follow up in 10 weeks. Contact office with any questions/problems/concerns.

## 2022-02-18 DIAGNOSIS — E11.9 TYPE 2 DIABETES MELLITUS WITHOUT COMPLICATION, WITHOUT LONG-TERM CURRENT USE OF INSULIN (HCC): ICD-10-CM

## 2022-02-18 NOTE — TELEPHONE ENCOUNTER
PCP out of office.        Jelani Beckham called requesting a refill of the below medication which has been pended for you:     Requested Prescriptions     Pending Prescriptions Disp Refills    SITagliptin (JANUVIA) 50 MG tablet [Pharmacy Med Name: Patrizia Roque 50MG TABLETS] 30 tablet 11     Sig: TAKE 1 TABLET BY MOUTH DAILY       Last Appointment Date: 9/17/2021  Next Appointment Date: 3/18/2022    Allergies   Allergen Reactions    Fenofibrate Rash

## 2022-03-17 DIAGNOSIS — E11.9 TYPE 2 DIABETES MELLITUS WITHOUT COMPLICATION, WITHOUT LONG-TERM CURRENT USE OF INSULIN (HCC): ICD-10-CM

## 2022-03-17 NOTE — TELEPHONE ENCOUNTER
Left detailed message regarding lab work that needs to be completed.        Elisa Baires called requesting a refill of the below medication which has been pended for you:     Requested Prescriptions     Pending Prescriptions Disp Refills    SITagliptin (JANUVIA) 50 MG tablet [Pharmacy Med Name: Mary Carrier 50MG TABLETS] 30 tablet 0     Sig: TAKE 1 TABLET BY MOUTH DAILY       Last Appointment Date: Visit date not found  Next Appointment Date: Visit date not found    Allergies   Allergen Reactions    Fenofibrate Rash

## 2022-03-21 ENCOUNTER — PATIENT MESSAGE (OUTPATIENT)
Dept: FAMILY MEDICINE CLINIC | Age: 57
End: 2022-03-21

## 2022-03-22 NOTE — TELEPHONE ENCOUNTER
From: Bree Ny  To: Dr. Dillan Jaffe: 3/21/2022 3:18 PM EDT  Subject: Refill     For whatever their reason, Michael says my refill for Fillmore Rigoberto has been refused by my doctor. [address] [place] [address]. ..lo. please submit the refill.  Thank you

## 2022-03-23 ENCOUNTER — PATIENT MESSAGE (OUTPATIENT)
Dept: FAMILY MEDICINE CLINIC | Age: 57
End: 2022-03-23

## 2022-03-24 NOTE — TELEPHONE ENCOUNTER
From: Jovanny Ny  To: Dr. Karley Mclean: 3/23/2022 3:14 PM EDT  Subject: Chair lift    I need a note from you stating that I would benefit from obtaining a vehicle power chair lift for my power chair, please. You can text a photo of the note to [place] at . At your earliest convenience. It will save me from paying the taxes on the life I have to purchase.  Thank you

## 2022-03-28 ENCOUNTER — HOSPITAL ENCOUNTER (OUTPATIENT)
Dept: LAB | Age: 57
Discharge: HOME | End: 2022-03-28
Payer: COMMERCIAL

## 2022-03-28 DIAGNOSIS — E78.1 HYPERTRIGLYCERIDEMIA: ICD-10-CM

## 2022-03-28 DIAGNOSIS — E11.9 TYPE 2 DIABETES MELLITUS WITHOUT COMPLICATION, WITHOUT LONG-TERM CURRENT USE OF INSULIN (HCC): ICD-10-CM

## 2022-03-28 LAB
CHOLESTEROL/HDL RATIO: 8.6
CHOLESTEROL: 163 MG/DL
ESTIMATED AVERAGE GLUCOSE: 151 MG/DL
HBA1C MFR BLD: 6.9 % (ref 4–6)
HDLC SERPL-MCNC: 19 MG/DL
LDL CHOLESTEROL: 72 MG/DL (ref 0–130)
TRIGL SERPL-MCNC: 360 MG/DL

## 2022-03-28 PROCEDURE — 83036 HEMOGLOBIN GLYCOSYLATED A1C: CPT

## 2022-03-28 PROCEDURE — 80061 LIPID PANEL: CPT

## 2022-03-28 PROCEDURE — 36415 COLL VENOUS BLD VENIPUNCTURE: CPT

## 2022-03-29 SDOH — HEALTH STABILITY: PHYSICAL HEALTH: ON AVERAGE, HOW MANY DAYS PER WEEK DO YOU ENGAGE IN MODERATE TO STRENUOUS EXERCISE (LIKE A BRISK WALK)?: 0 DAYS

## 2022-03-29 ASSESSMENT — PATIENT HEALTH QUESTIONNAIRE - PHQ9
1. LITTLE INTEREST OR PLEASURE IN DOING THINGS: 0
SUM OF ALL RESPONSES TO PHQ9 QUESTIONS 1 & 2: 0
2. FEELING DOWN, DEPRESSED OR HOPELESS: 0
SUM OF ALL RESPONSES TO PHQ QUESTIONS 1-9: 0

## 2022-03-29 ASSESSMENT — LIFESTYLE VARIABLES
HOW OFTEN DURING THE LAST YEAR HAVE YOU HAD A FEELING OF GUILT OR REMORSE AFTER DRINKING: 0
HOW OFTEN DURING THE LAST YEAR HAVE YOU HAD A FEELING OF GUILT OR REMORSE AFTER DRINKING: NEVER
HAS A RELATIVE, FRIEND, DOCTOR, OR ANOTHER HEALTH PROFESSIONAL EXPRESSED CONCERN ABOUT YOUR DRINKING OR SUGGESTED YOU CUT DOWN: NO
HAVE YOU OR SOMEONE ELSE BEEN INJURED AS A RESULT OF YOUR DRINKING: NO
HOW OFTEN DO YOU HAVE SIX OR MORE DRINKS ON ONE OCCASION: 2
HOW OFTEN DURING THE LAST YEAR HAVE YOU NEEDED AN ALCOHOLIC DRINK FIRST THING IN THE MORNING TO GET YOURSELF GOING AFTER A NIGHT OF HEAVY DRINKING: NEVER
HOW OFTEN DURING THE LAST YEAR HAVE YOU NEEDED AN ALCOHOLIC DRINK FIRST THING IN THE MORNING TO GET YOURSELF GOING AFTER A NIGHT OF HEAVY DRINKING: 0
HOW OFTEN DURING THE LAST YEAR HAVE YOU BEEN UNABLE TO REMEMBER WHAT HAPPENED THE NIGHT BEFORE BECAUSE YOU HAD BEEN DRINKING: 0
HOW OFTEN DURING THE LAST YEAR HAVE YOU FOUND THAT YOU WERE NOT ABLE TO STOP DRINKING ONCE YOU HAD STARTED: NEVER
HOW OFTEN DURING THE LAST YEAR HAVE YOU FAILED TO DO WHAT WAS NORMALLY EXPECTED FROM YOU BECAUSE OF DRINKING: NEVER
HOW MANY STANDARD DRINKS CONTAINING ALCOHOL DO YOU HAVE ON A TYPICAL DAY: 1
HOW OFTEN DURING THE LAST YEAR HAVE YOU FOUND THAT YOU WERE NOT ABLE TO STOP DRINKING ONCE YOU HAD STARTED: 0
HOW MANY STANDARD DRINKS CONTAINING ALCOHOL DO YOU HAVE ON A TYPICAL DAY: 1 OR 2
HAVE YOU OR SOMEONE ELSE BEEN INJURED AS A RESULT OF YOUR DRINKING: 0
HAS A RELATIVE, FRIEND, DOCTOR, OR ANOTHER HEALTH PROFESSIONAL EXPRESSED CONCERN ABOUT YOUR DRINKING OR SUGGESTED YOU CUT DOWN: 0
HOW OFTEN DURING THE LAST YEAR HAVE YOU FAILED TO DO WHAT WAS NORMALLY EXPECTED FROM YOU BECAUSE OF DRINKING: 0
HOW OFTEN DO YOU HAVE A DRINK CONTAINING ALCOHOL: 3
HOW OFTEN DO YOU HAVE A DRINK CONTAINING ALCOHOL: 2-4 TIMES A MONTH
HOW OFTEN DURING THE LAST YEAR HAVE YOU BEEN UNABLE TO REMEMBER WHAT HAPPENED THE NIGHT BEFORE BECAUSE YOU HAD BEEN DRINKING: NEVER

## 2022-04-04 ENCOUNTER — OFFICE VISIT (OUTPATIENT)
Dept: FAMILY MEDICINE CLINIC | Age: 57
End: 2022-04-04
Payer: COMMERCIAL

## 2022-04-04 VITALS
HEIGHT: 72 IN | DIASTOLIC BLOOD PRESSURE: 86 MMHG | OXYGEN SATURATION: 99 % | SYSTOLIC BLOOD PRESSURE: 130 MMHG | HEART RATE: 99 BPM | WEIGHT: 270 LBS | TEMPERATURE: 97.2 F | BODY MASS INDEX: 36.57 KG/M2

## 2022-04-04 DIAGNOSIS — R31.9 HEMATURIA, UNSPECIFIED TYPE: ICD-10-CM

## 2022-04-04 DIAGNOSIS — E78.1 HYPERTRIGLYCERIDEMIA: ICD-10-CM

## 2022-04-04 DIAGNOSIS — I10 ESSENTIAL HYPERTENSION: ICD-10-CM

## 2022-04-04 DIAGNOSIS — M62.838 MUSCLE SPASTICITY: ICD-10-CM

## 2022-04-04 DIAGNOSIS — G47.33 OBSTRUCTIVE SLEEP APNEA SYNDROME: ICD-10-CM

## 2022-04-04 DIAGNOSIS — K21.9 GASTROESOPHAGEAL REFLUX DISEASE, UNSPECIFIED WHETHER ESOPHAGITIS PRESENT: ICD-10-CM

## 2022-04-04 DIAGNOSIS — G80.9 CEREBRAL PALSY, UNSPECIFIED TYPE (HCC): ICD-10-CM

## 2022-04-04 DIAGNOSIS — M21.059 HIP JOINT VALGUS DEFORMITY, UNSPECIFIED LATERALITY: ICD-10-CM

## 2022-04-04 DIAGNOSIS — E11.9 TYPE 2 DIABETES MELLITUS WITHOUT COMPLICATION, WITHOUT LONG-TERM CURRENT USE OF INSULIN (HCC): Primary | ICD-10-CM

## 2022-04-04 PROCEDURE — 3044F HG A1C LEVEL LT 7.0%: CPT | Performed by: FAMILY MEDICINE

## 2022-04-04 PROCEDURE — 3017F COLORECTAL CA SCREEN DOC REV: CPT | Performed by: FAMILY MEDICINE

## 2022-04-04 PROCEDURE — G8417 CALC BMI ABV UP PARAM F/U: HCPCS | Performed by: FAMILY MEDICINE

## 2022-04-04 PROCEDURE — 2022F DILAT RTA XM EVC RTNOPTHY: CPT | Performed by: FAMILY MEDICINE

## 2022-04-04 PROCEDURE — G8427 DOCREV CUR MEDS BY ELIG CLIN: HCPCS | Performed by: FAMILY MEDICINE

## 2022-04-04 PROCEDURE — 99214 OFFICE O/P EST MOD 30 MIN: CPT | Performed by: FAMILY MEDICINE

## 2022-04-04 PROCEDURE — 1036F TOBACCO NON-USER: CPT | Performed by: FAMILY MEDICINE

## 2022-04-04 RX ORDER — DULAGLUTIDE 0.75 MG/.5ML
0.75 INJECTION, SOLUTION SUBCUTANEOUS WEEKLY
Qty: 6 ML | Refills: 3 | Status: SHIPPED | OUTPATIENT
Start: 2022-04-04

## 2022-04-04 RX ORDER — GEMFIBROZIL 600 MG/1
600 TABLET, FILM COATED ORAL 2 TIMES DAILY
Qty: 180 TABLET | Refills: 3 | Status: SHIPPED | OUTPATIENT
Start: 2022-04-04

## 2022-04-04 ASSESSMENT — ENCOUNTER SYMPTOMS
CONSTIPATION: 1
SHORTNESS OF BREATH: 0
BLOOD IN STOOL: 0

## 2022-04-04 NOTE — PROGRESS NOTES
[address]  [address], [address]  [phone]      Antonio Cabrales is a 64 y.o. male who presents today for his medical conditions/complaints as noted below. Antonio Cabrales is c/o of Hypertension and Diabetes      HPI:     Pt here today for follow-up of HTN and DM. Results reviewed with pt during OV today - LP shows high TG's at 360 (up from 293) and low HDL (down from 25 to 19), but LDL at goal; A1c improved from 7.5% to 6.9%. Pt has lost 14 lbs in the past year - eating smaller portion sizes and eating less often. Likes to [place] and was eating a lot while he was cooking, and has been watching this. Taking Januvia 50 mg daily, Metformin XR 5164 mg BID, and Trulicity 3.67 mg once weekly on Sunday's for DM - taking compliantly. Only checks glucose at home once per month - can be 130-220, depending on what time he checks.  Also taking ASA 81 mg nightly. Has DM eye exam scheduled in the next couple months with [facility].     Taking Lopid 600 mg BID for hypertriglyceridemia - stable.     Taking Lisinopril-HCTZ 20-25 mg daily and Lopressor 50 mg BID for HTN - stable. BP well-controlled today - 130/86.     Taking Diclofenac 75 mg BID for myalgias - stable. Stopped taking Pepcid 40 mg daily as of 6 months ago; only taking OTC Prilosec 20 mg nightly now, and has been stable on this. Has had to decrease his intake of spicy peppers a little bit, however.      Taking Flexeril 10 mg nightly at bedtime for muscle spasticity; sometimes has to take one during the day as needed.     Pt using Bi-Pap machine nightly for CHAD - was able to get a new machine 1.5 months ago. Using power wheelchair 24/7 - doing well with this. Current chair needs some updates. Past Medical History:   Diagnosis Date    Arthritis     Carpal tunnel syndrome     Bilateral.    Cerebral palsy (HCC)     Chest pain     Atypical, history of.     Chronic arthralgias of knees and hips     Degenerative disc disease     Lumbar.  Diabetes mellitus, type 2 ([address]     Hip joint valgus deformity     Right hip secondary to muscle imbalance and cerebral palsy.  History of blood transfusion     History of kidney stones     Hypertension     Pyelonephritis 10/14/1988    Reflux     Sleep apnea     Spasticity     Severe, lower extremities, with multiple procedures to the hips and knees.  Ureteral calculi 1991    Right with obstruction. Past Surgical History:   Procedure Laterality Date    CARPAL TUNNEL RELEASE Right 2009    CHOLECYSTECTOMY  2016    laparscopic w/op grams    COLONOSCOPY  10/1/15    normal    HIP OSTEOTOMY Left     HIP OSTEOTOMY Right     Varus derotation.  LITHOTRIPSY Right 1991    With cystoscopy and ureteral catheter placement.  LUMBAR FUSION  2005    L4-L5.  OTHER SURGICAL HISTORY      pelvic surgery    TOTAL HIP ARTHROPLASTY Left      Family History   Problem Relation Age of Onset    Heart Disease Mother     Heart Disease Father     Heart Attack Brother         age 46    Heart Disease Paternal Uncle      Social History     Tobacco Use    Smoking status: Former Smoker     Types: Cigarettes, Cigars     Quit date: 10/9/1999     Years since quittin.5    Smokeless tobacco: Never Used    Tobacco comment: quit smoking 10/9/1999. [facility], 2016. Substance Use Topics    Alcohol use: Yes     Types: 12 Standard drinks or equivalent per week     Comment: during summer.   very occasional during the wintwr      Current Outpatient Medications   Medication Sig Dispense Refill    Dulaglutide (TRULICITY) 4.19 SK/1.4UY SOPN Inject 0.75 mg into the skin once a week 6 mL 3    gemfibrozil (LOPID) 600 MG tablet Take 1 tablet by mouth 2 times daily 180 tablet 3    SITagliptin (JANUVIA) 50 MG tablet TAKE 1 TABLET BY MOUTH DAILY 90 tablet 0    metFORMIN (GLUCOPHAGE-XR) 500 MG extended release tablet TAKE 2 TABLETS BY MOUTH TWICE DAILY 360 tablet 3    lisinopril-hydroCHLOROthiazide (PRINZIDE;ZESTORETIC) 20-25 MG per tablet TAKE 1 TABLET BY MOUTH DAILY 90 tablet 3    diclofenac (VOLTAREN) 75 MG EC tablet TAKE 1 TABLET BY MOUTH TWICE DAILY WITH FOOD 180 tablet 3    blood glucose monitor strips TEST GLUCOSE ONCE DAILY. Please fill per insurance coverage. 100 strip 3    cyclobenzaprine (FLEXERIL) 10 MG tablet TAKE 1 TABLET BY MOUTH TWICE DAILY AS NEEDED 180 tablet 3    Misc. Devices [facility]) MISC Use daily 1 each 0    ONETOUCH DELICA LANCETS FINE MISC TEST THREE TIMES WEEKLY AS DIRECTED 100 each 0    aspirin 81 MG tablet Take 81 mg by mouth daily.  omeprazole (PRILOSEC) 20 MG delayed release capsule Take 20 mg by mouth daily       metoprolol tartrate (LOPRESSOR) 50 MG tablet TAKE 1 TABLET BY MOUTH TWICE DAILY 180 tablet 3     No current facility-administered medications for this visit. Allergies   Allergen Reactions    Fenofibrate Rash       Health Maintenance   Topic Date Due    COVID-19 Vaccine (1) Never done    Hepatitis B vaccine (1 of 3 - Risk 3-dose series) Never done    Shingles Vaccine (1 of 2) Never done    Diabetic retinal exam  06/18/2021    Diabetic microalbuminuria test  11/02/2021    Potassium monitoring  02/23/2022    Creatinine monitoring  02/23/2022    Flu vaccine (Season Ended) 09/01/2022    Diabetic foot exam  02/09/2023    A1C test (Diabetic or Prediabetic)  03/28/2023    Lipid screen  03/28/2023    Depression Screen  03/29/2023    DTaP/Tdap/Td vaccine (2 - Td or Tdap) 05/11/2025    Colorectal Cancer Screen  10/01/2025    Pneumococcal 0-64 years Vaccine (2 of 2 - PPSV23) 08/07/2030    Hepatitis C screen  Completed    HIV screen  Completed    Hepatitis A vaccine  Aged Out    Hib vaccine  Aged Out    Meningococcal (ACWY) vaccine  Aged Out       Subjective:      Review of Systems   Constitutional: Negative for fever.  Unexpected weight change: has lost 14 lbs since his last OV in 3/2021. Respiratory: Negative for shortness of breath. Cardiovascular: Positive for palpitations (infrequent). Negative for chest pain and leg swelling. Gastrointestinal: Positive for constipation. Negative for blood in stool. Genitourinary: Positive for frequency (has been going overnight, which is unusual for him) and hematuria (intermittent; also has noticed stronger smell to urine). Negative for dysuria. Skin: Negative for rash and wound. Neurological: Negative for dizziness. Psychiatric/Behavioral: Negative for dysphoric mood and suicidal ideas. Objective:     Vitals:    04/04/22 1032   BP: 130/86   Site: Right Upper Arm   Position: Sitting   Cuff Size: Large Adult   Pulse: 99   Temp: 97.2 °F (36.2 °C)   TempSrc: Temporal   SpO2: 99%   Weight: 270 lb (122.5 kg)   Height: 6' (1.829 m)     Physical Exam  Vitals and nursing note reviewed. Constitutional:       General: He is not in acute distress. Appearance: He is well-developed. HENT:      Head: Normocephalic and atraumatic. Right Ear: Tympanic membrane, ear canal and external ear normal.      Left Ear: Tympanic membrane, ear canal and external ear normal.      Nose: Nose normal.      Mouth/Throat:      Mouth: Mucous membranes are moist.      Pharynx: Oropharynx is clear. No oropharyngeal exudate. Eyes:      Conjunctiva/sclera: Conjunctivae normal.   Neck:      Comments: Small, superficial, mobile, well-circumscribed lump in R anterior neck; no change since last visit; nontender. Cardiovascular:      Rate and Rhythm: Normal rate and regular rhythm. Heart sounds: Normal heart sounds. Pulmonary:      Effort: Pulmonary effort is normal. No respiratory distress. Breath sounds: Normal breath sounds. Abdominal:      General: Bowel sounds are normal. There is no distension. Palpations: Abdomen is soft. Tenderness: There is no abdominal tenderness.    Skin:     General: Skin is warm and dry. Neurological:      Mental Status: He is alert and oriented to person, place, and time. Assessment:      1. Type 2 diabetes mellitus without complication, without long-term current use of insulin (HCC)  -     Hemoglobin A1C; Future  -     Comprehensive Metabolic Panel; Future  -     Dulaglutide (TRULICITY) 4.92 PV/6.6BN SOPN; Inject 0.75 mg into the skin once a week, Disp-6 mL, R-3Normal  -     Microalbumin, Ur; Future  2. Essential hypertension  -     Comprehensive Metabolic Panel; Future  3. Hypertriglyceridemia  -     gemfibrozil (LOPID) 600 MG tablet; Take 1 tablet by mouth 2 times daily, Disp-180 tablet, R-3Normal  4. Hematuria, unspecified type  -     Urinalysis with Reflex to Culture; Future  5. Gastroesophageal reflux disease, unspecified whether esophagitis present  6. Obstructive sleep apnea syndrome  7. Muscle spasticity  8. Cerebral palsy, unspecified type ([address]  9. Hip joint valgus deformity, unspecified laterality         Plan: Will continue Lopid 600 mg BID for hypertriglyceridemia and re-check LP again in 1 year. Will continue DM regimen and re-check A1c again in 6 months. Declined Covid vaccine today. Return in about 6 months (around 10/4/2022) for f/u DM, HTN, labs. Orders Placed This Encounter   Procedures    Hemoglobin A1C     Standing Status:   Future     Standing Expiration Date:   4/4/2023    Comprehensive Metabolic Panel     Standing Status:   Future     Standing Expiration Date:   4/4/2023   Jocelyn Lei, Ur     Standing Status:   Future     Standing Expiration Date:   4/4/2023    Urinalysis with Reflex to Culture     Standing Status:   Future     Standing Expiration Date:   4/4/2023     Order Specific Question:   SPECIFY(EX-CATH,MIDSTREAM,CYSTO,ETC)?      Answer:   clean catch     Orders Placed This Encounter   Medications    Dulaglutide (TRULICITY) 6.33 WK/3.2QE SOPN     Sig: Inject 0.75 mg into the skin once a week     Dispense:  6 mL Refill:  3    gemfibrozil (LOPID) 600 MG tablet     Sig: Take 1 tablet by mouth 2 times daily     Dispense:  180 tablet     Refill:  3       Patient given educational materials - see patient instructions. Discussed use, benefit, and side effects of prescribed medications. All patient questions answered. Pt voiced understanding. Reviewed health maintenance.             Electronically signed by Renetta Salinas DO, DO on 4/10/2022 at 11:58 PM

## 2022-04-05 ENCOUNTER — TELEMEDICINE (OUTPATIENT)
Dept: FAMILY MEDICINE CLINIC | Age: 57
End: 2022-04-05
Payer: COMMERCIAL

## 2022-04-05 DIAGNOSIS — Z00.00 MEDICARE ANNUAL WELLNESS VISIT, SUBSEQUENT: Primary | ICD-10-CM

## 2022-04-05 PROCEDURE — 3017F COLORECTAL CA SCREEN DOC REV: CPT | Performed by: FAMILY MEDICINE

## 2022-04-05 PROCEDURE — G0439 PPPS, SUBSEQ VISIT: HCPCS | Performed by: FAMILY MEDICINE

## 2022-04-05 NOTE — PROGRESS NOTES
Medicare Annual Wellness Visit    Dwayne Oconnell is here for Medicare AWV    Assessment & Plan   Medicare annual wellness visit, subsequent      Recommendations for Preventive Services Due: see orders and patient instructions/AVS.  Recommended screening schedule for the next 5-10 years is provided to the patient in written form: see Patient Instructions/AVS.     No follow-ups on file. Subjective     Patient's complete Health Risk Assessment and screening values have been reviewed and are found in Flowsheets. The following problems were reviewed today and where indicated follow up appointments were made and/or referrals ordered. Positive Risk Factor Screenings with Interventions:    Fall Risk:  Do you feel unsteady or are you worried about falling? : (!) yes  2 or more falls in past year?: (!) yes  Fall with injury in past year?: no     Fall Risk Interventions:    · Hx of cerebral palsy, hip joint valgus deformity, has w/c he uses to get around in the house. States \"sometimes it gives out while I'm loading my w/c and I can't do anything to stop it\"       Alcohol Screening:  AUDIT Total Score: 3    A score of 8 or more is associated with harmful or hazardous drinking. A score of 13 or more in women, and 15 or more in men, is likely to indicate alcohol dependence.     Substance Abuse - Alcohol Interventions:  patient is not ready to change his/her alcohol consumption behavior at this time          General Health and ACP:  General  In general, how would you say your health is?: Very Good  In the past 7 days, have you experienced any of the following: New or Increased Pain, New or Increased Fatigue, Loneliness, Social Isolation, Stress or Anger?: No  Do you get the social and emotional support that you need?: Yes  Do you have a Living Will?: (!) No    Advance Directives     Power of [facility] Will ACP-Advance Directive ACP-Power of     Not on File Not on File Not on File Not on File      General Health Risk Interventions:  · No Living Will: Patient declines ACP discussion/assistance; will be meeting with  to have documents started. Health Habits/Nutrition:     Physical Activity: Unknown    Days of Exercise per Week: 0 days    Minutes of Exercise per Session: Not on file     Have you lost any weight without trying in the past 3 months?: No     Have you seen the dentist within the past year?: Yes    Health Habits/Nutrition Interventions:  · Inadequate physical activity:  Has difficulty with mobiliy     Safety:  Do you have working smoke detectors?: Yes  Do you have any tripping hazards - loose or unsecured carpets or rugs?: No  Do you have any tripping hazards - clutter in doorways, halls, or stairs?: No  Do you have either shower bars, grab bars, non-slip mats or non-slip surfaces in your shower or bathtub?: Yes  Do all of your stairways have a railing or banister?: (!) No  Do you always fasten your seatbelt when you are in a car?: Yes    Safety Interventions:  · Does not have stairs, has converted them to ramps           Objective      Patient-Reported Vitals  No data recorded           Allergies   Allergen Reactions    Fenofibrate Rash     Prior to Visit Medications    Medication Sig Taking?  Authorizing Provider   Dulaglutide (TRULICITY) 5.54 OL/6.4TR SOPN Inject 0.75 mg into the skin once a week  Gilda Thakkar, DO   gemfibrozil (LOPID) 600 MG tablet Take 1 tablet by mouth 2 times daily  Gilda Thakkar, DO   SITagliptin (JANUVIA) 50 MG tablet TAKE 1 TABLET BY MOUTH DAILY  Arabella Thakkar, DO   metFORMIN (GLUCOPHAGE-XR) 500 MG extended release tablet TAKE 2 TABLETS BY MOUTH TWICE DAILY  Dolly Thakkar, DO   lisinopril-hydroCHLOROthiazide (PRINZIDE;ZESTORETIC) 20-25 MG per tablet TAKE 1 TABLET BY MOUTH DAILY  Arabella Thakkar, DO   diclofenac (VOLTAREN) 75 MG EC tablet TAKE 1 TABLET BY MOUTH TWICE DAILY WITH FOOD  Arabella Thakkar, DO   metoprolol tartrate (LOPRESSOR) 50 MG tablet TAKE 1 TABLET BY MOUTH TWICE DAILY  Juaquin Gilford Black,    blood glucose monitor strips TEST GLUCOSE ONCE DAILY. Please fill per insurance coverage. Juaquin Gilford Black, DO   cyclobenzaprine (FLEXERIL) 10 MG tablet TAKE 1 TABLET BY MOUTH TWICE DAILY AS NEEDED  Juaquin Gilford Black, DO   Misc. Devices [facility]) MISC Use daily  Juaquin Gilford Black, DO   ONETOUCH DELICA LANCETS FINE MISC TEST THREE TIMES WEEKLY AS DIRECTED  Juaquin Gilford Black, DO   aspirin 81 MG tablet Take 81 mg by mouth daily. Historical Provider, MD   omeprazole (PRILOSEC) 20 MG delayed release capsule Take 20 mg by mouth daily   Historical Provider, MD Morton (Including outside providers/suppliers regularly involved in providing care):   Patient Care Team:  Saúl Dent DO as PCP - General (Family Medicine)  Saúl Dent DO as PCP - [facility] Empaneled Provider  Pascual Ramirez DPM as Consulting Physician (Podiatry)    Reviewed and updated this visit:  Tobacco  Allergies  Meds  Med Hx  Surg Hx  Soc Hx  Fam Hx           Pramod Hammonds, was evaluated through a synchronous (real-time) audio-video encounter. The patient (or guardian if applicable) is aware that this is a billable service, which includes applicable co-pays. This Virtual Visit was conducted with patient's (and/or legal guardian's) consent. The visit was conducted pursuant to the emergency declaration under the [address] [place] [address], [address] [place] [address] authority and the [employer] Act. Patient identification was verified, and a caregiver was present when appropriate. The patient was located at home in a state where the provider was licensed to provide care. This encounter was performed under Saúl auguste DOs, direct supervision, 4/5/2022.

## 2022-04-05 NOTE — PATIENT INSTRUCTIONS
Personalized Preventive Plan for Jim Chen - 4/5/2022  Medicare offers a range of preventive health benefits. Some of the tests and screenings are paid in full while other may be subject to a deductible, co-insurance, and/or copay. Some of these benefits include a comprehensive review of your medical history including lifestyle, illnesses that may run in your family, and various assessments and screenings as appropriate. After reviewing your medical record and screening and assessments performed today your provider may have ordered immunizations, labs, imaging, and/or referrals for you. A list of these orders (if applicable) as well as your Preventive Care list are included within your After Visit Summary for your review. Other Preventive Recommendations:    · A preventive eye exam performed by an eye specialist is recommended every 1-2 years to screen for glaucoma; cataracts, macular degeneration, and other eye disorders. · A preventive dental visit is recommended every 6 months. · Try to get at least 150 minutes of exercise per week or 10,000 steps per day on a pedometer . · Order or download the FREE \"Exercise & Physical Activity: Your Everyday Guide\" from [employer]. Call 5-762.736.6362 or search [employer] online. · You need 3222-3607 mg of calcium and 9504-5740 IU of vitamin D per day. It is possible to meet your calcium requirement with diet alone, but a vitamin D supplement is usually necessary to meet this goal.  · When exposed to the sun, use a sunscreen that protects against both UVA and UVB radiation with an SPF of 30 or greater. Reapply every 2 to 3 hours or after sweating, drying off with a towel, or swimming. · Always wear a seat belt when traveling in a car. Always wear a helmet when riding a bicycle or motorcycle.

## 2022-04-28 ENCOUNTER — HOSPITAL ENCOUNTER (OUTPATIENT)
Age: 57
Setting detail: SPECIMEN
Discharge: HOME | End: 2022-04-28
Payer: COMMERCIAL

## 2022-04-28 ENCOUNTER — OFFICE VISIT (OUTPATIENT)
Dept: PODIATRY | Age: 57
End: 2022-04-28
Payer: COMMERCIAL

## 2022-04-28 VITALS — HEART RATE: 88 BPM | DIASTOLIC BLOOD PRESSURE: 80 MMHG | SYSTOLIC BLOOD PRESSURE: 132 MMHG | RESPIRATION RATE: 20 BRPM

## 2022-04-28 DIAGNOSIS — R31.9 HEMATURIA, UNSPECIFIED TYPE: ICD-10-CM

## 2022-04-28 DIAGNOSIS — B35.1 DERMATOPHYTOSIS OF NAIL: ICD-10-CM

## 2022-04-28 DIAGNOSIS — E11.51 CONTROLLED TYPE 2 DM WITH PERIPHERAL CIRCULATORY DISORDER (HCC): Primary | ICD-10-CM

## 2022-04-28 LAB
-: ABNORMAL
BACTERIA: ABNORMAL
BILIRUBIN URINE: NEGATIVE
EPITHELIAL CELLS UA: ABNORMAL /HPF (ref 0–5)
GLUCOSE URINE: NEGATIVE
KETONES, URINE: NEGATIVE
LEUKOCYTE ESTERASE, URINE: ABNORMAL
NITRITE, URINE: POSITIVE
OTHER OBSERVATIONS UA: ABNORMAL
PH UA: 5.5 (ref 5–6)
PROTEIN UA: NEGATIVE
RBC UA: ABNORMAL /HPF (ref 0–4)
SPECIFIC GRAVITY UA: 1.02 (ref 1.01–1.02)
URINE HGB: ABNORMAL
UROBILINOGEN, URINE: NORMAL
WBC UA: ABNORMAL /HPF (ref 0–4)

## 2022-04-28 PROCEDURE — 87086 URINE CULTURE/COLONY COUNT: CPT

## 2022-04-28 PROCEDURE — 11721 DEBRIDE NAIL 6 OR MORE: CPT | Performed by: PODIATRIST

## 2022-04-28 PROCEDURE — 87186 SC STD MICRODIL/AGAR DIL: CPT

## 2022-04-28 PROCEDURE — 99999 PR OFFICE/OUTPT VISIT,PROCEDURE ONLY: CPT | Performed by: PODIATRIST

## 2022-04-28 PROCEDURE — 87077 CULTURE AEROBIC IDENTIFY: CPT

## 2022-04-28 PROCEDURE — 81001 URINALYSIS AUTO W/SCOPE: CPT

## 2022-04-28 NOTE — PROGRESS NOTES
Foot Care Worksheet  PCP: Flo Estes DO  Last visit:  04 / 05 / 2022    Nail description:  Thick , Yellow , Crumbly , Marked limitation of ambulation     Pain resulting from thickened and dystrophy of nail plate No    Nails involved  Right   1, 2, 3, 4, 5  (T5-T9)  Left     1, 2, 3, 4, 5  (TA-T4)    Q7 1 Class A Finding - Non traumatic amputation of foot No    Q8 2 Class B Findings - Absent DP pulse No, Absent PT pulse No, Advanced tropic changes (3 required) Yes    Decrease hair growth No, Nail changes/thickening Yes, Pigmented changes/discoloration Yes, Skin texture (thin, shiny) Yes, Skin color (rubor/redness) No    Q9 1 Class B and 2 Class C Findings  Claudication No, Temperature change Yes, Paresthesia Yes, Burning No, Edema Yes

## 2022-04-28 NOTE — PROGRESS NOTES
Subjective:  Rosa Hinojosa is a 64 y.o. male who presents to the office today for routine DM foot care. Currently denies F/C/N/V. Allergies   Allergen Reactions    Fenofibrate Rash       Past Medical History:   Diagnosis Date    Arthritis     Carpal tunnel syndrome     Bilateral.    Cerebral palsy (HCC)     Chest pain     Atypical, history of.  Chronic arthralgias of knees and hips     Degenerative disc disease     Lumbar.  Diabetes mellitus, type 2 ([address]     Hip joint valgus deformity     Right hip secondary to muscle imbalance and cerebral palsy.  History of blood transfusion     History of kidney stones     Hypertension     Pyelonephritis 10/14/1988    Reflux     Sleep apnea     Spasticity     Severe, lower extremities, with multiple procedures to the hips and knees.  Ureteral calculi 06/04/1991    Right with obstruction. Prior to Admission medications    Medication Sig Start Date End Date Taking? Authorizing Provider   Dulaglutide (TRULICITY) 4.37 UU/2.9CE SOPN Inject 0.75 mg into the skin once a week 4/4/22  Yes Elsy Thakkar, DO   gemfibrozil (LOPID) 600 MG tablet Take 1 tablet by mouth 2 times daily 4/4/22  Yes Gilda Thakkar, DO   SITagliptin (JANUVIA) 50 MG tablet TAKE 1 TABLET BY MOUTH DAILY 3/19/22  Yes Elsy Thakkar, DO   metFORMIN (GLUCOPHAGE-XR) 500 MG extended release tablet TAKE 2 TABLETS BY MOUTH TWICE DAILY 1/31/22  Yes Elsy Thakkar, DO   lisinopril-hydroCHLOROthiazide (PRINZIDE;ZESTORETIC) 20-25 MG per tablet TAKE 1 TABLET BY MOUTH DAILY 8/21/21  Yes Elsy Thakkar, DO   diclofenac (VOLTAREN) 75 MG EC tablet TAKE 1 TABLET BY MOUTH TWICE DAILY WITH FOOD 8/21/21  Yes Elsy Thakkar, DO   metoprolol tartrate (LOPRESSOR) 50 MG tablet TAKE 1 TABLET BY MOUTH TWICE DAILY 6/22/21  Yes Elsy Thakkar, DO   blood glucose monitor strips TEST GLUCOSE ONCE DAILY. Please fill per insurance coverage.  3/9/21  Yes Elsy Thakkar, DO   cyclobenzaprine (FLEXERIL) 10 MG tablet TAKE 1 TABLET BY MOUTH TWICE DAILY AS NEEDED 21  Yes Sherry Hinojosa, DO   Misc. Devices [facility]) MISC Use daily 20  Yes Andrea Thakkar, DO   ONETOUCH DELICA LANCETS FINE MISC TEST THREE TIMES WEEKLY AS DIRECTED 3/23/18  Yes Andrea Thakkar, DO   aspirin 81 MG tablet Take 81 mg by mouth daily. Yes Historical Provider, MD   omeprazole (PRILOSEC) 20 MG delayed release capsule Take 20 mg by mouth daily    Yes Historical Provider, MD       Past Surgical History:   Procedure Laterality Date    CARPAL TUNNEL RELEASE Right 2009    CHOLECYSTECTOMY  2016    laparscopic w/op grams    COLONOSCOPY  10/1/15    normal    HIP OSTEOTOMY Left     HIP OSTEOTOMY Right     Varus derotation.  LITHOTRIPSY Right 1991    With cystoscopy and ureteral catheter placement.  LUMBAR FUSION  2005    L4-L5.  OTHER SURGICAL HISTORY      pelvic surgery    TOTAL HIP ARTHROPLASTY Left        Family History   Problem Relation Age of Onset    Heart Disease Mother     Heart Disease Father     Heart Attack Brother         age 46    Heart Disease Paternal Uncle        Social History     Tobacco Use    Smoking status: Former Smoker     Types: Cigarettes, Cigars     Quit date: 10/9/1999     Years since quittin.5    Smokeless tobacco: Never Used    Tobacco comment: quit smoking 10/9/1999. Shawn Vogel [facility], 2016. Substance Use Topics    Alcohol use: Yes     Types: 12 Standard drinks or equivalent per week     Comment: during summer. very occasional during the wintwr       Review of Systems: All 12 systems reviewed and pertinent positives noted above. Lower Extremity Physical Examination:     Vitals:   Vitals:    22 0756   BP: 132/80   Pulse: 88   Resp: 20     General: AAO x 3 in NAD.      Vascular: DP and PT pulses palpable 2/4, bilateral.  CFT <3 seconds, bilateral.  Hair growth present to the level of the digits, bilateral.  Edema present, bilateral.  Varicosities present, bilateral. Erythema absent, bilateral. Distal Rubor absent bilateral.  Temperature within normal limits bilateral. Hyperpigmentation present bilateral. Atrophic skin yes. Neurological: Sensation Impaired to light touch to level of digits, bilateral.  Protective sensation intact  0/10 sites via 5.07/10g [place]-Aj Monofilament, bilateral.  negative Tinel's, bilateral.  negative Valleix sign, bilateral.  Vibratory abnormal  bilateral.  Reflexes Decreased bilateral.  Paresthesias positive. Dysthesias negative. Sharp/dull abnormal  bilateral.  Musculoskeletal: Muscle strength 4/5, bilateral.  Pain absent upon palpation bilateral. Normal medial longitudinal arch, bilateral.  Ankle ROM decreased,bilateral.  1st MPJ ROM within normal limits, bilateral.  Dorsally contracted digits absent. No other foot deformities. Integument:   Open lesion present distal L hallux. Interdigital maceration absent to web spaces absent, Bilateral.  Nails left 1, 2, 3, 4, 5 and right 1, 2, 3, 4, 5 thickened, dystrophic and crumbly, discolored with subungual debris. Fissures absent, Bilateral. Hyperkeratotic tissue is absent. Asessment: Patient is a 64 y.o. male with:    Diagnosis Orders   1. Controlled type 2 DM with peripheral circulatory disorder ([facility]     2. Dermatophytosis of nail         Plan: Patient examined and evaluated. Current condition and treatment options discussed in detail. DM foot ed and exam.    All nails as mentioned above debrided in length and thickness. Patient advised OTC methods for treatment of the mycotic nails. Patient will follow up in 10 weeks. Contact office with any questions/problems/concerns.

## 2022-04-30 LAB
CULTURE: ABNORMAL
SPECIMEN DESCRIPTION: ABNORMAL

## 2022-05-03 DIAGNOSIS — N30.01 ACUTE CYSTITIS WITH HEMATURIA: Primary | ICD-10-CM

## 2022-05-03 RX ORDER — CIPROFLOXACIN 500 MG/1
500 TABLET, FILM COATED ORAL 2 TIMES DAILY
Qty: 14 TABLET | Refills: 0 | Status: SHIPPED | OUTPATIENT
Start: 2022-05-03 | End: 2022-05-10

## 2022-06-15 DIAGNOSIS — E11.9 TYPE 2 DIABETES MELLITUS WITHOUT COMPLICATION, WITHOUT LONG-TERM CURRENT USE OF INSULIN (HCC): ICD-10-CM

## 2022-06-15 DIAGNOSIS — I10 ESSENTIAL HYPERTENSION: ICD-10-CM

## 2022-06-17 RX ORDER — METOPROLOL TARTRATE 50 MG/1
TABLET, FILM COATED ORAL
Qty: 180 TABLET | Refills: 3 | Status: SHIPPED | OUTPATIENT
Start: 2022-06-17

## 2022-07-14 ENCOUNTER — OFFICE VISIT (OUTPATIENT)
Dept: PODIATRY | Age: 57
End: 2022-07-14
Payer: COMMERCIAL

## 2022-07-14 VITALS — HEART RATE: 76 BPM | SYSTOLIC BLOOD PRESSURE: 122 MMHG | DIASTOLIC BLOOD PRESSURE: 70 MMHG | RESPIRATION RATE: 20 BRPM

## 2022-07-14 DIAGNOSIS — B35.1 DERMATOPHYTOSIS OF NAIL: ICD-10-CM

## 2022-07-14 DIAGNOSIS — E11.51 CONTROLLED TYPE 2 DM WITH PERIPHERAL CIRCULATORY DISORDER (HCC): Primary | ICD-10-CM

## 2022-07-14 PROCEDURE — 11721 DEBRIDE NAIL 6 OR MORE: CPT | Performed by: PODIATRIST

## 2022-07-14 PROCEDURE — 99999 PR OFFICE/OUTPT VISIT,PROCEDURE ONLY: CPT | Performed by: PODIATRIST

## 2022-07-14 NOTE — PROGRESS NOTES
Foot Care Worksheet  PCP: Jeffrey Sorto DO  Last visit:  04 / 05 / 2022    Nail description:  Thick , Yellow , Crumbly , Marked limitation of ambulation     Pain resulting from thickened and dystrophy of nail plate No    Nails involved  Right   1, 2, 3, 4, 5  (T5-T9)  Left     1, 2, 3, 4, 5  (TA-T4)    Q7 1 Class A Finding - Non traumatic amputation of foot No    Q8 2 Class B Findings - Absent DP pulse No, Absent PT pulse No, Advanced tropic changes (3 required) Yes    Decrease hair growth No, Nail changes/thickening Yes, Pigmented changes/discoloration Yes, Skin texture (thin, shiny) Yes, Skin color (rubor/redness) No    Q9 1 Class B and 2 Class C Findings  Claudication No, Temperature change Yes, Paresthesia Yes, Burning No, Edema Yes

## 2022-07-14 NOTE — PROGRESS NOTES
Subjective:  Bailey Watson is a 64 y.o. male who presents to the office today for routine DM foot care. Currently denies F/C/N/V. Allergies   Allergen Reactions    Fenofibrate Rash       Past Medical History:   Diagnosis Date    Arthritis     Carpal tunnel syndrome     Bilateral.    Cerebral palsy (HCC)     Chest pain     Atypical, history of.  Chronic arthralgias of knees and hips     Degenerative disc disease     Lumbar.  Diabetes mellitus, type 2 ([address]     Hip joint valgus deformity     Right hip secondary to muscle imbalance and cerebral palsy.  History of blood transfusion     History of kidney stones     Hypertension     Pyelonephritis 10/14/1988    Reflux     Sleep apnea     Spasticity     Severe, lower extremities, with multiple procedures to the hips and knees.  Ureteral calculi 06/04/1991    Right with obstruction. Prior to Admission medications    Medication Sig Start Date End Date Taking? Authorizing Provider   metoprolol tartrate (LOPRESSOR) 50 MG tablet TAKE 1 TABLET BY MOUTH TWICE DAILY 6/17/22  Yes [facility] Bijan, DO   SITagliptin (JANUVIA) 50 MG tablet TAKE 1 TABLET BY MOUTH DAILY 6/17/22  Yes [facility] Bijan, DO   Dulaglutide (TRULICITY) 5.19 NK/7.7QK SOPN Inject 0.75 mg into the skin once a week 4/4/22  Yes [facility] Bijan, DO   gemfibrozil (LOPID) 600 MG tablet Take 1 tablet by mouth 2 times daily 4/4/22  Yes [facility] Black, DO   metFORMIN (GLUCOPHAGE-XR) 500 MG extended release tablet TAKE 2 TABLETS BY MOUTH TWICE DAILY 1/31/22  Yes [facility] Bijan, DO   lisinopril-hydroCHLOROthiazide (PRINZIDE;ZESTORETIC) 20-25 MG per tablet TAKE 1 TABLET BY MOUTH DAILY 8/21/21  Yes [facility] Bijan, DO   diclofenac (VOLTAREN) 75 MG EC tablet TAKE 1 TABLET BY MOUTH TWICE DAILY WITH FOOD 8/21/21  Yes [facility] Bijan, DO   blood glucose monitor strips TEST GLUCOSE ONCE DAILY. Please fill per insurance coverage.  3/9/21  Yes Italia Viburnumroberta Thakkar, DO   cyclobenzaprine (FLEXERIL) 10 MG tablet TAKE 1 TABLET BY MOUTH TWICE DAILY AS NEEDED 21  Yes Federico Potter, DO   Misc. Devices [facility]) MISC Use daily 20  Yes Madeline Thakkar, DO   ONETOUCH DELICA LANCETS FINE MISC TEST THREE TIMES WEEKLY AS DIRECTED 3/23/18  Yes Madeline Thakkar, DO   aspirin 81 MG tablet Take 81 mg by mouth daily. Yes Historical Provider, MD   omeprazole (PRILOSEC) 20 MG delayed release capsule Take 20 mg by mouth daily    Yes Historical Provider, MD       Past Surgical History:   Procedure Laterality Date    CARPAL TUNNEL RELEASE Right 2009    CHOLECYSTECTOMY  2016    laparscopic w/op grams    COLONOSCOPY  10/1/15    normal    HIP OSTEOTOMY Left     HIP OSTEOTOMY Right     Varus derotation.  LITHOTRIPSY Right 1991    With cystoscopy and ureteral catheter placement.  LUMBAR FUSION  2005    L4-L5.  OTHER SURGICAL HISTORY      pelvic surgery    TOTAL HIP ARTHROPLASTY Left        Family History   Problem Relation Age of Onset    Heart Disease Mother     Heart Disease Father     Heart Attack Brother         age 46    Heart Disease Paternal Uncle        Social History     Tobacco Use    Smoking status: Former Smoker     Types: Cigarettes, Cigars     Quit date: 10/9/1999     Years since quittin.7    Smokeless tobacco: Never Used    Tobacco comment: quit smoking 10/9/1999. Jean Buchanan [facility], 2016. Substance Use Topics    Alcohol use: Yes     Types: 12 Standard drinks or equivalent per week     Comment: during summer. very occasional during the wintwr       Review of Systems: All 12 systems reviewed and pertinent positives noted above. Lower Extremity Physical Examination:     Vitals:   Vitals:    22 0746   BP: 122/70   Pulse: 76   Resp: 20     General: AAO x 3 in NAD.      Vascular: DP and PT pulses palpable 2/4, bilateral.  CFT <3 seconds, bilateral.  Hair growth present to the level of the digits, bilateral.  Edema present, bilateral.  Varicosities present, bilateral. Erythema absent, bilateral. Distal Rubor absent bilateral.  Temperature within normal limits bilateral. Hyperpigmentation present bilateral. Atrophic skin yes. Neurological: Sensation Impaired to light touch to level of digits, bilateral.  Protective sensation intact  0/10 sites via 5.07/10g [place]-Aj Monofilament, bilateral.  negative Tinel's, bilateral.  negative Valleix sign, bilateral.  Vibratory abnormal  bilateral.  Reflexes Decreased bilateral.  Paresthesias positive. Dysthesias negative. Sharp/dull abnormal  bilateral.  Musculoskeletal: Muscle strength 4/5, bilateral.  Pain absent upon palpation bilateral. Normal medial longitudinal arch, bilateral.  Ankle ROM decreased,bilateral.  1st MPJ ROM within normal limits, bilateral.  Dorsally contracted digits absent. No other foot deformities. Integument:   Open lesion present distal L hallux. Interdigital maceration absent to web spaces absent, Bilateral.  Nails left 1, 2, 3, 4, 5 and right 1, 2, 3, 4, 5 thickened, dystrophic and crumbly, discolored with subungual debris. Fissures absent, Bilateral. Hyperkeratotic tissue is absent. Asessment: Patient is a 64 y.o. male with:    Diagnosis Orders   1. Controlled type 2 DM with peripheral circulatory disorder ([facility] [address]     2. Dermatophytosis of nail         Plan: Patient examined and evaluated. Current condition and treatment options discussed in detail. DM foot ed and exam.    All nails as mentioned above debrided in length and thickness. Patient advised OTC methods for treatment of the mycotic nails. Patient will follow up in 10 weeks. Contact office with any questions/problems/concerns.

## 2022-08-11 DIAGNOSIS — M62.838 MUSCLE SPASTICITY: ICD-10-CM

## 2022-08-12 RX ORDER — CYCLOBENZAPRINE HCL 10 MG
TABLET ORAL
Qty: 180 TABLET | Refills: 3 | Status: SHIPPED | OUTPATIENT
Start: 2022-08-12

## 2022-08-12 NOTE — TELEPHONE ENCOUNTER
Judd Duff called requesting a refill of the below medication which has been pended for you:     Requested Prescriptions     Pending Prescriptions Disp Refills    cyclobenzaprine (FLEXERIL) 10 MG tablet [Pharmacy Med Name: CYCLOBENZAPRINE 10MG TABLETS] 180 tablet 3     Sig: TAKE 1 TABLET BY MOUTH TWICE DAILY AS NEEDED       Last Appointment Date: 4/5/2022  Next Appointment Date: 10/4/2022    Allergies   Allergen Reactions    Fenofibrate Rash

## 2022-08-14 DIAGNOSIS — M25.50 ARTHRALGIA OF MULTIPLE JOINTS: ICD-10-CM

## 2022-08-14 DIAGNOSIS — I10 ESSENTIAL HYPERTENSION: ICD-10-CM

## 2022-08-14 DIAGNOSIS — M47.816 OSTEOARTHRITIS OF LUMBAR SPINE, UNSPECIFIED SPINAL OSTEOARTHRITIS COMPLICATION STATUS: ICD-10-CM

## 2022-08-15 RX ORDER — DICLOFENAC SODIUM 75 MG/1
TABLET, DELAYED RELEASE ORAL
Qty: 180 TABLET | Refills: 3 | Status: SHIPPED | OUTPATIENT
Start: 2022-08-15

## 2022-08-15 RX ORDER — LISINOPRIL AND HYDROCHLOROTHIAZIDE 25; 20 MG/1; MG/1
1 TABLET ORAL DAILY
Qty: 90 TABLET | Refills: 3 | Status: SHIPPED | OUTPATIENT
Start: 2022-08-15

## 2022-08-15 NOTE — TELEPHONE ENCOUNTER
Sallie Adams called requesting a refill of the below medication which has been pended for you:     Requested Prescriptions     Pending Prescriptions Disp Refills    diclofenac (VOLTAREN) 75 MG EC tablet [Pharmacy Med Name: DICLOFENAC SODIUM 75MG DR TABLETS] 180 tablet 3     Sig: TAKE 1 TABLET BY MOUTH TWICE DAILY WITH FOOD    lisinopril-hydroCHLOROthiazide (PRINZIDE;ZESTORETIC) 20-25 MG per tablet [Pharmacy Med Name: LISINOPRIL-HCTZ 20/25MG TABLETS] 90 tablet 3     Sig: TAKE 1 TABLET BY MOUTH DAILY       Last Appointment Date: 4/5/2022  Next Appointment Date: 10/4/2022    Allergies   Allergen Reactions    Fenofibrate Rash

## 2022-09-20 ENCOUNTER — PATIENT MESSAGE (OUTPATIENT)
Dept: FAMILY MEDICINE CLINIC | Age: 57
End: 2022-09-20

## 2022-09-20 NOTE — TELEPHONE ENCOUNTER
From: Garima Ny  To: Dr. Alfred Montielu: 9/20/2022 12:13 PM EDT  Subject: Tests     I have an appointment 10/04/2022, I will be at the clinic this Thursday. Are there any testing ordered?  If so I could take care of them then

## 2022-09-22 ENCOUNTER — HOSPITAL ENCOUNTER (OUTPATIENT)
Dept: LAB | Age: 57
Discharge: HOME | End: 2022-09-22
Payer: COMMERCIAL

## 2022-09-22 ENCOUNTER — OFFICE VISIT (OUTPATIENT)
Dept: PODIATRY | Age: 57
End: 2022-09-22
Payer: COMMERCIAL

## 2022-09-22 VITALS
BODY MASS INDEX: 36.62 KG/M2 | SYSTOLIC BLOOD PRESSURE: 130 MMHG | DIASTOLIC BLOOD PRESSURE: 72 MMHG | HEIGHT: 72 IN | HEART RATE: 68 BPM

## 2022-09-22 DIAGNOSIS — B35.1 DERMATOPHYTOSIS OF NAIL: ICD-10-CM

## 2022-09-22 DIAGNOSIS — E11.51 CONTROLLED TYPE 2 DM WITH PERIPHERAL CIRCULATORY DISORDER (HCC): Primary | ICD-10-CM

## 2022-09-22 DIAGNOSIS — I10 ESSENTIAL HYPERTENSION: ICD-10-CM

## 2022-09-22 DIAGNOSIS — E11.9 TYPE 2 DIABETES MELLITUS WITHOUT COMPLICATION, WITHOUT LONG-TERM CURRENT USE OF INSULIN (HCC): ICD-10-CM

## 2022-09-22 LAB
ALBUMIN SERPL-MCNC: 4.3 G/DL (ref 3.5–5.2)
ALBUMIN/GLOBULIN RATIO: 1.7 (ref 1–2.5)
ALP BLD-CCNC: 58 U/L (ref 40–129)
ALT SERPL-CCNC: 15 U/L (ref 5–41)
ANION GAP SERPL CALCULATED.3IONS-SCNC: 11 MMOL/L (ref 9–17)
AST SERPL-CCNC: 16 U/L
BILIRUB SERPL-MCNC: 0.2 MG/DL (ref 0.3–1.2)
BUN BLDV-MCNC: 24 MG/DL (ref 6–20)
BUN/CREAT BLD: 15 (ref 9–20)
CALCIUM SERPL-MCNC: 9.4 MG/DL (ref 8.6–10.4)
CHLORIDE BLD-SCNC: 104 MMOL/L (ref 98–107)
CO2: 24 MMOL/L (ref 20–31)
CREAT SERPL-MCNC: 1.57 MG/DL (ref 0.7–1.2)
ESTIMATED AVERAGE GLUCOSE: 163 MG/DL
GFR AFRICAN AMERICAN: 55 ML/MIN
GFR NON-AFRICAN AMERICAN: 46 ML/MIN
GFR SERPL CREATININE-BSD FRML MDRD: ABNORMAL ML/MIN/{1.73_M2}
GLUCOSE BLD-MCNC: 174 MG/DL (ref 70–99)
HBA1C MFR BLD: 7.3 % (ref 4–6)
POTASSIUM SERPL-SCNC: 4.7 MMOL/L (ref 3.7–5.3)
SODIUM BLD-SCNC: 139 MMOL/L (ref 135–144)
TOTAL PROTEIN: 6.9 G/DL (ref 6.4–8.3)

## 2022-09-22 PROCEDURE — 80053 COMPREHEN METABOLIC PANEL: CPT

## 2022-09-22 PROCEDURE — 11721 DEBRIDE NAIL 6 OR MORE: CPT | Performed by: PODIATRIST

## 2022-09-22 PROCEDURE — 36415 COLL VENOUS BLD VENIPUNCTURE: CPT

## 2022-09-22 PROCEDURE — 99999 PR OFFICE/OUTPT VISIT,PROCEDURE ONLY: CPT | Performed by: PODIATRIST

## 2022-09-22 PROCEDURE — 83036 HEMOGLOBIN GLYCOSYLATED A1C: CPT

## 2022-09-22 NOTE — PROGRESS NOTES
Foot Care Worksheet  PCP: Kj Ott DO  Last visit:  04 / 05 / 2022    Nail description: Thick , Yellow , Crumbly , Marked limitation of ambulation     Pain resulting from thickened and dystrophy of nail plate No    Nails involved  Right   1, 2, 3, 4, 5  (T5-T9)  Left     1, 2, 3, 4, 5  (TA-T4)    Q7 1 Class A Finding - Non traumatic amputation of foot No    Q8 2 Class B Findings - Absent DP pulse No, Absent PT pulse No, Advanced tropic changes (3 required) Yes    Decrease hair growth No, Nail changes/thickening Yes, Pigmented changes/discoloration Yes, Skin texture (thin, shiny) Yes, Skin color (rubor/redness) No    Q9 1 Class B and 2 Class C Findings  Claudication No, Temperature change Yes, Paresthesia Yes, Burning No, Edema Yes    Subjective:  Azra Cousin is a 62 y.o. male who presents to the office today for routine DM foot care. Currently denies F/C/N/V. Allergies   Allergen Reactions    Fenofibrate Rash       Past Medical History:   Diagnosis Date    Arthritis     Carpal tunnel syndrome     Bilateral.    Cerebral palsy (HCC)     Chest pain     Atypical, history of. Chronic arthralgias of knees and hips     Degenerative disc disease     Lumbar. Diabetes mellitus, type 2 (HCC)     Hip joint valgus deformity     Right hip secondary to muscle imbalance and cerebral palsy. History of blood transfusion     History of kidney stones     Hypertension     Pyelonephritis 10/14/1988    Reflux     Sleep apnea     Spasticity     Severe, lower extremities, with multiple procedures to the hips and knees. Ureteral calculi 06/04/1991    Right with obstruction. Prior to Admission medications    Medication Sig Start Date End Date Taking?  Authorizing Provider   diclofenac (VOLTAREN) 75 MG EC tablet TAKE 1 TABLET BY MOUTH TWICE DAILY WITH FOOD 8/15/22  Yes Mignon Thakkar,    lisinopril-hydroCHLOROthiazide (PRINZIDE;ZESTORETIC) 20-25 MG per tablet TAKE 1 TABLET BY MOUTH DAILY 8/15/22  Yes Mignon Leon Black, DO   cyclobenzaprine (FLEXERIL) 10 MG tablet TAKE 1 TABLET BY MOUTH TWICE DAILY AS NEEDED 8/12/22  Yes Sherrill Thakkar, DO   metoprolol tartrate (LOPRESSOR) 50 MG tablet TAKE 1 TABLET BY MOUTH TWICE DAILY 6/17/22  Yes Sherrill Thakkar, DO   SITagliptin (JANUVIA) 50 MG tablet TAKE 1 TABLET BY MOUTH DAILY 6/17/22  Yes Sherrill Thakkar, DO   Dulaglutide (TRULICITY) 7.84 ZC/8.3DH SOPN Inject 0.75 mg into the skin once a week 4/4/22  Yes Sherrill Thakkar, DO   gemfibrozil (LOPID) 600 MG tablet Take 1 tablet by mouth 2 times daily 4/4/22  Yes Sherrill Thakkar, DO   metFORMIN (GLUCOPHAGE-XR) 500 MG extended release tablet TAKE 2 TABLETS BY MOUTH TWICE DAILY 1/31/22  Yes Sherrill Thakkar, DO   blood glucose monitor strips TEST GLUCOSE ONCE DAILY. Please fill per insurance coverage. 3/9/21  Yes Tiffany Saul, DO   Misc. Devices [facility]) MISC Use daily 11/23/20  Yes Sherrill Thakkar, DO   ONETOUCH DELICA LANCETS FINE MISC TEST THREE TIMES WEEKLY AS DIRECTED 3/23/18  Yes Sherrill Thakkar, DO   aspirin 81 MG tablet Take 81 mg by mouth daily. Yes Historical Provider, MD   omeprazole (PRILOSEC) 20 MG delayed release capsule Take 20 mg by mouth daily    Yes Historical Provider, MD       Past Surgical History:   Procedure Laterality Date    CARPAL TUNNEL RELEASE Right 09/21/2009    CHOLECYSTECTOMY  09/14/2016    laparscopic w/op grams    COLONOSCOPY  10/1/15    normal    HIP OSTEOTOMY Left 1989    HIP OSTEOTOMY Right 1983    Varus derotation. LITHOTRIPSY Right 06/04/1991    With cystoscopy and ureteral catheter placement. LUMBAR FUSION  June 2005    L4-L5.     OTHER SURGICAL HISTORY      pelvic surgery    TOTAL HIP ARTHROPLASTY Left 1999       Family History   Problem Relation Age of Onset    Heart Disease Mother     Heart Disease Father     Heart Attack Brother         age 46    Heart Disease Paternal Uncle        Social History     Tobacco Use    Smoking status: Former     Types: Cigarettes, Cigars     Quit date: 10/9/1999     Years since quittin.9    Smokeless tobacco: Never    Tobacco comments:     quit smoking 10/9/1999. Kaitlin Corrigan P, 2016. Substance Use Topics    Alcohol use: Yes     Types: 12 Standard drinks or equivalent per week     Comment: during summer. very occasional during the wintwr       Review of Systems: All 12 systems reviewed and pertinent positives noted above. Lower Extremity Physical Examination:     Vitals:   Vitals:    22 0755   BP: 130/72   Pulse: 68     General: AAO x 3 in NAD. Vascular: DP and PT pulses palpable 2/4, bilateral.  CFT <3 seconds, bilateral.  Hair growth present to the level of the digits, bilateral.  Edema present, bilateral.  Varicosities present, bilateral. Erythema absent, bilateral. Distal Rubor absent bilateral.  Temperature within normal limits bilateral. Hyperpigmentation present bilateral. Atrophic skin yes. Neurological: Sensation Impaired to light touch to level of digits, bilateral.  Protective sensation intact  0/10 sites via 5.07/10g [place]-Aj Monofilament, bilateral.  negative Tinel's, bilateral.  negative Valleix sign, bilateral.  Vibratory abnormal  bilateral.  Reflexes Decreased bilateral.  Paresthesias positive. Dysthesias negative. Sharp/dull abnormal  bilateral.  Musculoskeletal: Muscle strength 4/5, bilateral.  Pain absent upon palpation bilateral. Normal medial longitudinal arch, bilateral.  Ankle ROM decreased,bilateral.  1st MPJ ROM within normal limits, bilateral.  Dorsally contracted digits absent. No other foot deformities. Integument:   Open lesion present distal L hallux. Interdigital maceration absent to web spaces absent, Bilateral.  Nails left 1, 2, 3, 4, 5 and right 1, 2, 3, 4, 5 thickened, dystrophic and crumbly, discolored with subungual debris. Fissures absent, Bilateral. Hyperkeratotic tissue is absent. Asessment: Patient is a 62 y.o. male with:    Diagnosis Orders   1.  Controlled type 2 DM with peripheral circulatory [facility]        2. Dermatophytosis of nail            Plan: Patient examined and evaluated. Current condition and treatment options discussed in detail. DM foot ed and exam.    All nails as mentioned above debrided in length and thickness. Patient advised OTC methods for treatment of the mycotic nails. Patient will follow up in 10 weeks. Contact office with any questions/problems/concerns.

## 2022-10-04 ENCOUNTER — OFFICE VISIT (OUTPATIENT)
Dept: FAMILY MEDICINE CLINIC | Age: 57
End: 2022-10-04
Payer: COMMERCIAL

## 2022-10-04 ENCOUNTER — HOSPITAL ENCOUNTER (OUTPATIENT)
Age: 57
Setting detail: SPECIMEN
Discharge: HOME | End: 2022-10-04
Payer: COMMERCIAL

## 2022-10-04 VITALS
DIASTOLIC BLOOD PRESSURE: 70 MMHG | TEMPERATURE: 97.7 F | WEIGHT: 279 LBS | SYSTOLIC BLOOD PRESSURE: 108 MMHG | HEART RATE: 88 BPM | BODY MASS INDEX: 37.79 KG/M2 | RESPIRATION RATE: 18 BRPM | OXYGEN SATURATION: 98 % | HEIGHT: 72 IN

## 2022-10-04 DIAGNOSIS — E78.1 HYPERTRIGLYCERIDEMIA: ICD-10-CM

## 2022-10-04 DIAGNOSIS — G47.33 OBSTRUCTIVE SLEEP APNEA SYNDROME: ICD-10-CM

## 2022-10-04 DIAGNOSIS — E11.9 TYPE 2 DIABETES MELLITUS WITHOUT COMPLICATION, WITHOUT LONG-TERM CURRENT USE OF INSULIN (HCC): Primary | ICD-10-CM

## 2022-10-04 DIAGNOSIS — I10 ESSENTIAL HYPERTENSION: ICD-10-CM

## 2022-10-04 DIAGNOSIS — R79.89 ELEVATED SERUM CREATININE: ICD-10-CM

## 2022-10-04 DIAGNOSIS — E11.9 TYPE 2 DIABETES MELLITUS WITHOUT COMPLICATION, WITHOUT LONG-TERM CURRENT USE OF INSULIN (HCC): ICD-10-CM

## 2022-10-04 DIAGNOSIS — Z12.5 SCREENING FOR PROSTATE CANCER: ICD-10-CM

## 2022-10-04 LAB
CREATININE URINE: 98.5 MG/DL (ref 39–259)
MICROALBUMIN/CREAT 24H UR: <12 MG/L
MICROALBUMIN/CREAT UR-RTO: NORMAL MCG/MG CREAT

## 2022-10-04 PROCEDURE — G8484 FLU IMMUNIZE NO ADMIN: HCPCS | Performed by: FAMILY MEDICINE

## 2022-10-04 PROCEDURE — 3017F COLORECTAL CA SCREEN DOC REV: CPT | Performed by: FAMILY MEDICINE

## 2022-10-04 PROCEDURE — 82043 UR ALBUMIN QUANTITATIVE: CPT

## 2022-10-04 PROCEDURE — G8427 DOCREV CUR MEDS BY ELIG CLIN: HCPCS | Performed by: FAMILY MEDICINE

## 2022-10-04 PROCEDURE — 82570 ASSAY OF URINE CREATININE: CPT

## 2022-10-04 PROCEDURE — 3051F HG A1C>EQUAL 7.0%<8.0%: CPT | Performed by: FAMILY MEDICINE

## 2022-10-04 PROCEDURE — 1036F TOBACCO NON-USER: CPT | Performed by: FAMILY MEDICINE

## 2022-10-04 PROCEDURE — 99214 OFFICE O/P EST MOD 30 MIN: CPT | Performed by: FAMILY MEDICINE

## 2022-10-04 PROCEDURE — 2022F DILAT RTA XM EVC RTNOPTHY: CPT | Performed by: FAMILY MEDICINE

## 2022-10-04 PROCEDURE — G8417 CALC BMI ABV UP PARAM F/U: HCPCS | Performed by: FAMILY MEDICINE

## 2022-10-04 ASSESSMENT — ENCOUNTER SYMPTOMS: BLOOD IN STOOL: 0

## 2022-10-04 NOTE — PROGRESS NOTES
BALA Parker [address]  [address], [address] [place]  [phone]      Tianna Fine is a 62 y.o. male who presents today for his medical conditions/complaints as noted below. Tianna Fine is c/o of Diabetes and Hypertension      HPI:     Pt here today for follow-up of DM and HTN. Has cut down on his alcohol intake - has switched to beer to cut out sweet liquor/mixers. Drinks 12-24 beers per weekend, but this will decrease after he's done going to [place] at the end of this month, and might be only 1-2 beers per month after that. Has been eating more peanuts; still trying to eat smaller portions. Taking Januvia 50 mg daily, Metformin XR 9955 mg BID, and Trulicity 0.50 mg once weekly on Sunday's for DM - taking compliantly. Only checks glucose at home once per month - has not checked recently. Also taking ASA 81 mg nightly. Has DM eye exam scheduled in the next couple months with [address]. Taking Lopid 600 mg BID for hypertriglyceridemia - stable. Taking Lisinopril-HCTZ 20-25 mg daily and Lopressor 50 mg BID for HTN - stable. BP well-controlled today - 108/70. Taking Diclofenac 75 mg BID for myalgias - stable. Taking OTC Prilosec 20 mg nightly now, and has been stable on this. Has had to decrease his intake of spicy peppers a little bit, however. Heartburn has been stable recently. Taking Flexeril 10 mg nightly at bedtime for muscle spasticity; sometimes has to take one during the day as needed. Pt using Bi-Pap machine nightly for CHAD - was able to get a new machine 1.5 months ago. Using power wheelchair 24/7 - doing well with this. Past Medical History:   Diagnosis Date    Arthritis     Carpal tunnel syndrome     Bilateral.    Cerebral palsy (HCC)     Chest pain     Atypical, history of. Chronic arthralgias of knees and hips     Degenerative disc disease     Lumbar.     Diabetes mellitus, type 2 ([address] Hip joint valgus deformity     Right hip secondary to muscle imbalance and cerebral palsy. History of blood transfusion     History of kidney stones     Hypertension     Pyelonephritis 10/14/1988    Reflux     Sleep apnea     Spasticity     Severe, lower extremities, with multiple procedures to the hips and knees. Ureteral calculi 1991    Right with obstruction. Past Surgical History:   Procedure Laterality Date    CARPAL TUNNEL RELEASE Right 2009    CHOLECYSTECTOMY  2016    laparscopic w/op grams    COLONOSCOPY  10/1/15    normal    HIP OSTEOTOMY Left     HIP OSTEOTOMY Right     Varus derotation. LITHOTRIPSY Right 1991    With cystoscopy and ureteral catheter placement. LUMBAR FUSION  2005    L4-L5. OTHER SURGICAL HISTORY      pelvic surgery    TOTAL HIP ARTHROPLASTY Left      Family History   Problem Relation Age of Onset    Heart Disease Mother     Heart Disease Father     Heart Attack Brother         age 46    Heart Disease Paternal Uncle      Social History     Tobacco Use    Smoking status: Former     Types: Cigarettes, Cigars     Quit date: 10/9/1999     Years since quittin.0    Smokeless tobacco: Never    Tobacco comments:     quit smoking 10/9/1999. Jarad Canchola RCP, 2016. Substance Use Topics    Alcohol use: Yes     Types: 12 Standard drinks or equivalent per week     Comment: during summer.   very occasional during the wintwr      Current Outpatient Medications   Medication Sig Dispense Refill    diclofenac (VOLTAREN) 75 MG EC tablet TAKE 1 TABLET BY MOUTH TWICE DAILY WITH FOOD 180 tablet 3    lisinopril-hydroCHLOROthiazide (PRINZIDE;ZESTORETIC) 20-25 MG per tablet TAKE 1 TABLET BY MOUTH DAILY 90 tablet 3    cyclobenzaprine (FLEXERIL) 10 MG tablet TAKE 1 TABLET BY MOUTH TWICE DAILY AS NEEDED 180 tablet 3    metoprolol tartrate (LOPRESSOR) 50 MG tablet TAKE 1 TABLET BY MOUTH TWICE DAILY 180 tablet 3    SITagliptin (JANUVIA) 50 MG tablet TAKE 1 TABLET BY MOUTH DAILY 90 tablet 3    Dulaglutide (TRULICITY) 1.40 EC/6.4QW SOPN Inject 0.75 mg into the skin once a week 6 mL 3    gemfibrozil (LOPID) 600 MG tablet Take 1 tablet by mouth 2 times daily 180 tablet 3    metFORMIN (GLUCOPHAGE-XR) 500 MG extended release tablet TAKE 2 TABLETS BY MOUTH TWICE DAILY 360 tablet 3    blood glucose monitor strips TEST GLUCOSE ONCE DAILY. Please fill per insurance coverage. 100 strip 3    Misc. Devices [facility]) MISC Use daily 1 each 0    ONETOUCH DELICA LANCETS FINE MISC TEST THREE TIMES WEEKLY AS DIRECTED 100 each 0    aspirin 81 MG tablet Take 81 mg by mouth daily. omeprazole (PRILOSEC) 20 MG delayed release capsule Take 20 mg by mouth daily        No current facility-administered medications for this visit. Allergies   Allergen Reactions    Fenofibrate Rash       Health Maintenance   Topic Date Due    COVID-19 Vaccine (1) Never done    Hepatitis B vaccine (1 of 3 - Risk 3-dose series) Never done    Shingles vaccine (1 of 2) Never done    Diabetic retinal exam  06/18/2021    Flu vaccine (1) Never done    Lipids  03/28/2023    Depression Screen  03/29/2023    Diabetic foot exam  09/22/2023    A1C test (Diabetic or Prediabetic)  09/22/2023    Diabetic microalbuminuria test  10/04/2023    DTaP/Tdap/Td vaccine (2 - Td or Tdap) 05/11/2025    Colorectal Cancer Screen  10/01/2025    Pneumococcal 0-64 years Vaccine  Completed    Hepatitis C screen  Completed    HIV screen  Completed    Hepatitis A vaccine  Aged Out    Hib vaccine  Aged Out    Meningococcal (ACWY) vaccine  Aged Out       Subjective:      Review of Systems   Constitutional:  Negative for unexpected weight change. Cardiovascular:  Negative for chest pain and palpitations. Gastrointestinal:  Negative for blood in stool. Genitourinary:  Negative for dysuria and hematuria. Skin:  Negative for rash and wound. Neurological:  Positive for light-headedness (intermittent, when he stands).  Negative for dizziness. Psychiatric/Behavioral:  Negative for dysphoric mood and suicidal ideas. The patient is not nervous/anxious. Objective:     Vitals:    10/04/22 0954   BP: 108/70   Site: Right Upper Arm   Position: Sitting   Pulse: 88   Resp: 18   Temp: 97.7 °F (36.5 °C)   TempSrc: Temporal   SpO2: 98%   Weight: 279 lb (126.6 kg)   Height: 6' (1.829 m)     Physical Exam  Vitals and nursing note reviewed. Constitutional:       General: He is not in acute distress. Appearance: He is well-developed. HENT:      Head: Normocephalic and atraumatic. Right Ear: Tympanic membrane, ear canal and external ear normal.      Left Ear: Tympanic membrane, ear canal and external ear normal.      Nose: Nose normal.      Mouth/Throat:      Mouth: Mucous membranes are moist.      Pharynx: Oropharynx is clear. No oropharyngeal exudate. Eyes:      Conjunctiva/sclera: Conjunctivae normal.   Cardiovascular:      Rate and Rhythm: Normal rate and regular rhythm. Heart sounds: Normal heart sounds. Pulmonary:      Effort: Pulmonary effort is normal. No respiratory distress. Breath sounds: Normal breath sounds. Abdominal:      General: Bowel sounds are normal. There is no distension. Palpations: Abdomen is soft. Tenderness: There is no abdominal tenderness. Skin:     General: Skin is warm and dry. Neurological:      Mental Status: He is alert and oriented to person, place, and time. Assessment:      1. Type 2 diabetes mellitus without complication, without long-term current use of insulin (HCC)  2. Elevated serum creatinine  -     Basic Metabolic Panel; Future  3. Essential hypertension  4. Hypertriglyceridemia  5. Obstructive sleep apnea syndrome  6. Screening for prostate cancer  -     PSA Screening; Future       Plan:      Declined flu vaccine today. Return in about 6 months (around 4/4/2023) for f/u DM, HTN.     Orders Placed This Encounter   Procedures    Basic Metabolic Panel Standing Status:   Future     Standing Expiration Date:   10/4/2023    PSA Screening     Standing Status:   Future     Standing Expiration Date:   10/4/2023     No orders of the defined types were placed in this encounter. Patient given educational materials - see patient instructions. Discussed use, benefit, and side effects of prescribed medications. All patient questions answered. Pt voiced understanding. Reviewed health maintenance.             Electronically signed by Kimberlee Patino DO, DO on 10/17/2022 at 12:10 AM

## 2022-10-17 ENCOUNTER — HOSPITAL ENCOUNTER (OUTPATIENT)
Dept: LAB | Age: 57
Discharge: HOME | End: 2022-10-17
Payer: COMMERCIAL

## 2022-10-17 DIAGNOSIS — R79.89 ELEVATED SERUM CREATININE: ICD-10-CM

## 2022-10-17 DIAGNOSIS — Z12.5 SCREENING FOR PROSTATE CANCER: ICD-10-CM

## 2022-10-17 LAB
ANION GAP SERPL CALCULATED.3IONS-SCNC: 13 MMOL/L (ref 9–17)
BUN BLDV-MCNC: 18 MG/DL (ref 6–20)
BUN/CREAT BLD: 13 (ref 9–20)
CALCIUM SERPL-MCNC: 9.7 MG/DL (ref 8.6–10.4)
CHLORIDE BLD-SCNC: 103 MMOL/L (ref 98–107)
CO2: 23 MMOL/L (ref 20–31)
CREAT SERPL-MCNC: 1.34 MG/DL (ref 0.7–1.2)
GFR SERPL CREATININE-BSD FRML MDRD: >60 ML/MIN/1.73M2
GLUCOSE BLD-MCNC: 219 MG/DL (ref 70–99)
POTASSIUM SERPL-SCNC: 4.4 MMOL/L (ref 3.7–5.3)
PROSTATE SPECIFIC ANTIGEN: 0.25 NG/ML
SODIUM BLD-SCNC: 139 MMOL/L (ref 135–144)

## 2022-10-17 PROCEDURE — G0103 PSA SCREENING: HCPCS

## 2022-10-17 PROCEDURE — 80048 BASIC METABOLIC PNL TOTAL CA: CPT

## 2022-10-17 PROCEDURE — 36415 COLL VENOUS BLD VENIPUNCTURE: CPT

## 2022-10-20 DIAGNOSIS — R79.89 ELEVATED SERUM CREATININE: Primary | ICD-10-CM

## 2022-10-20 DIAGNOSIS — Z12.5 SCREENING FOR PROSTATE CANCER: ICD-10-CM

## 2022-10-24 DIAGNOSIS — E11.9 TYPE 2 DIABETES MELLITUS WITHOUT COMPLICATION, WITHOUT LONG-TERM CURRENT USE OF INSULIN (HCC): Primary | ICD-10-CM

## 2022-11-06 ENCOUNTER — PATIENT MESSAGE (OUTPATIENT)
Dept: FAMILY MEDICINE CLINIC | Age: 57
End: 2022-11-06

## 2022-11-07 NOTE — TELEPHONE ENCOUNTER
From: Pita Ny  To: Dr. Schaefer Butter: 11/6/2022 8:00 PM EST  Subject: Pain management     What are your thoughts on medical Marijuana? What are your insights on it, pros and cons?

## 2022-11-12 ENCOUNTER — PATIENT MESSAGE (OUTPATIENT)
Dept: FAMILY MEDICINE CLINIC | Age: 57
End: 2022-11-12

## 2022-11-12 DIAGNOSIS — M25.519 SHOULDER PAIN, UNSPECIFIED CHRONICITY, UNSPECIFIED LATERALITY: Primary | ICD-10-CM

## 2022-11-12 DIAGNOSIS — M54.2 NECK PAIN: ICD-10-CM

## 2022-11-17 NOTE — TELEPHONE ENCOUNTER
From: Salud Ny  To: Dr. Aguiar Payment: 11/12/2022 10:18 PM EST  Subject: Pain management     Let's set something up with pain management.

## 2022-12-01 ENCOUNTER — OFFICE VISIT (OUTPATIENT)
Dept: PODIATRY | Age: 57
End: 2022-12-01

## 2022-12-01 VITALS — HEART RATE: 64 BPM | DIASTOLIC BLOOD PRESSURE: 80 MMHG | SYSTOLIC BLOOD PRESSURE: 128 MMHG

## 2022-12-01 DIAGNOSIS — B35.1 DERMATOPHYTOSIS OF NAIL: ICD-10-CM

## 2022-12-01 DIAGNOSIS — E11.51 CONTROLLED TYPE 2 DM WITH PERIPHERAL CIRCULATORY DISORDER (HCC): Primary | ICD-10-CM

## 2022-12-01 NOTE — PROGRESS NOTES
Foot Care Worksheet  PCP: Magdalena Aguayo DO  Last visit:  10/4/2022    Nail description: Thick , Yellow , Crumbly , Marked limitation of ambulation     Pain resulting from thickened and dystrophy of nail plate No    Nails involved  Right   1, 2, 3, 4, 5  (T5-T9)  Left     1, 2, 3, 4, 5  (TA-T4)    Q7 1 Class A Finding - Non traumatic amputation of foot No    Q8 2 Class B Findings - Absent DP pulse No, Absent PT pulse No, Advanced tropic changes (3 required) Yes    Decrease hair growth No, Nail changes/thickening Yes, Pigmented changes/discoloration Yes, Skin texture (thin, shiny) Yes, Skin color (rubor/redness) No    Q9 1 Class B and 2 Class C Findings  Claudication No, Temperature change Yes, Paresthesia Yes, Burning No, Edema Yes      Subjective:  Charity Louis is a 62 y.o. male who presents to the office today for routine DM foot care. Currently denies F/C/N/V. Allergies   Allergen Reactions    Fenofibrate Rash       Past Medical History:   Diagnosis Date    Arthritis     Carpal tunnel syndrome     Bilateral.    Cerebral palsy (HCC)     Chest pain     Atypical, history of. Chronic arthralgias of knees and hips     Degenerative disc disease     Lumbar. Diabetes mellitus, type 2 (HCC)     Hip joint valgus deformity     Right hip secondary to muscle imbalance and cerebral palsy. History of blood transfusion     History of kidney stones     Hypertension     Pyelonephritis 10/14/1988    Reflux     Sleep apnea     Spasticity     Severe, lower extremities, with multiple procedures to the hips and knees. Ureteral calculi 06/04/1991    Right with obstruction. Prior to Admission medications    Medication Sig Start Date End Date Taking?  Authorizing Provider   diclofenac (VOLTAREN) 75 MG EC tablet TAKE 1 TABLET BY MOUTH TWICE DAILY WITH FOOD 8/15/22  Yes Rosy Thakkar DO   lisinopril-hydroCHLOROthiazide (PRINZIDE;ZESTORETIC) 20-25 MG per tablet TAKE 1 TABLET BY MOUTH DAILY 8/15/22  Yes Magdalena Aguayo DO   cyclobenzaprine (FLEXERIL) 10 MG tablet TAKE 1 TABLET BY MOUTH TWICE DAILY AS NEEDED 8/12/22  Yes Marck Thakkar, DO   metoprolol tartrate (LOPRESSOR) 50 MG tablet TAKE 1 TABLET BY MOUTH TWICE DAILY 6/17/22  Yes Marck Florez Black, DO   SITagliptin (JANUVIA) 50 MG tablet TAKE 1 TABLET BY MOUTH DAILY 6/17/22  Yes Marck Thakkar, DO   Dulaglutide (TRULICITY) 5.14 HN/6.3VN SOPN Inject 0.75 mg into the skin once a week 4/4/22  Yes Marck Thakkar, DO   gemfibrozil (LOPID) 600 MG tablet Take 1 tablet by mouth 2 times daily 4/4/22  Yes Marck Thakkar, DO   metFORMIN (GLUCOPHAGE-XR) 500 MG extended release tablet TAKE 2 TABLETS BY MOUTH TWICE DAILY 1/31/22  Yes Marck Thakkar, DO   blood glucose monitor strips TEST GLUCOSE ONCE DAILY. Please fill per insurance coverage. 3/9/21  Yes Edgewood Ry, DO   Misc. Devices [facility]) MISC Use daily 11/23/20  Yes Marck Thakkar, DO   ONETOUCH DELICA LANCETS FINE MISC TEST THREE TIMES WEEKLY AS DIRECTED 3/23/18  Yes Marck Thakkar, DO   aspirin 81 MG tablet Take 81 mg by mouth daily. Yes Historical Provider, MD   omeprazole (PRILOSEC) 20 MG delayed release capsule Take 20 mg by mouth daily    Yes Historical Provider, MD       Past Surgical History:   Procedure Laterality Date    CARPAL TUNNEL RELEASE Right 09/21/2009    CHOLECYSTECTOMY  09/14/2016    laparscopic w/op grams    COLONOSCOPY  10/1/15    normal    HIP OSTEOTOMY Left 1989    HIP OSTEOTOMY Right 1983    Varus derotation. LITHOTRIPSY Right 06/04/1991    With cystoscopy and ureteral catheter placement. LUMBAR FUSION  June 2005    L4-L5.     OTHER SURGICAL HISTORY      pelvic surgery    TOTAL HIP ARTHROPLASTY Left 1999       Family History   Problem Relation Age of Onset    Heart Disease Mother     Heart Disease Father     Heart Attack Brother         age 46    Heart Disease Paternal Uncle        Social History     Tobacco Use    Smoking status: Former     Types: Cigarettes, Cigars     Quit date: 10/9/1999     Years since quittin.1    Smokeless tobacco: Never    Tobacco comments:     quit smoking 10/9/1999. Claribel Ricks [facility], 2016. Substance Use Topics    Alcohol use: Yes     Types: 12 Standard drinks or equivalent per week     Comment: during summer. very occasional during the wintwr       Review of Systems: All 12 systems reviewed and pertinent positives noted above. Lower Extremity Physical Examination:     Vitals:   Vitals:    22 0753   BP: 128/80   Pulse: 64     General: AAO x 3 in NAD. Vascular: DP and PT pulses palpable 2/4, bilateral.  CFT <3 seconds, bilateral.  Hair growth present to the level of the digits, bilateral.  Edema present, bilateral.  Varicosities present, bilateral. Erythema absent, bilateral. Distal Rubor absent bilateral.  Temperature within normal limits bilateral. Hyperpigmentation present bilateral. Atrophic skin yes. Neurological: Sensation Impaired to light touch to level of digits, bilateral.  Protective sensation intact  0/10 sites via 5.07/10g [place]-Aj Monofilament, bilateral.  negative Tinel's, bilateral.  negative Valleix sign, bilateral.  Vibratory abnormal  bilateral.  Reflexes Decreased bilateral.  Paresthesias positive. Dysthesias negative. Sharp/dull abnormal  bilateral.  Musculoskeletal: Muscle strength 4/5, bilateral.  Pain absent upon palpation bilateral. Normal medial longitudinal arch, bilateral.  Ankle ROM decreased,bilateral.  1st MPJ ROM within normal limits, bilateral.  Dorsally contracted digits absent. No other foot deformities. Integument:   Open lesion present distal L hallux. Interdigital maceration absent to web spaces absent, Bilateral.  Nails left 1, 2, 3, 4, 5 and right 1, 2, 3, 4, 5 thickened, dystrophic and crumbly, discolored with subungual debris. Fissures absent, Bilateral. Hyperkeratotic tissue is absent. Asessment: Patient is a 62 y.o. male with:    Diagnosis Orders   1.  Controlled type 2 DM with peripheral circulatory disorder ([facility]        2. Dermatophytosis of nail            Plan: Patient examined and evaluated. Current condition and treatment options discussed in detail. DM foot ed and exam.    All nails as mentioned above debrided in length and thickness. Patient advised OTC methods for treatment of the mycotic nails. Patient will follow up in 10 weeks. Contact office with any questions/problems/concerns.

## 2022-12-12 ENCOUNTER — OFFICE VISIT (OUTPATIENT)
Dept: PAIN MANAGEMENT | Age: 57
End: 2022-12-12
Payer: COMMERCIAL

## 2022-12-12 VITALS
HEIGHT: 72 IN | HEART RATE: 106 BPM | OXYGEN SATURATION: 99 % | SYSTOLIC BLOOD PRESSURE: 118 MMHG | DIASTOLIC BLOOD PRESSURE: 72 MMHG | BODY MASS INDEX: 37.52 KG/M2 | WEIGHT: 277 LBS

## 2022-12-12 DIAGNOSIS — R79.89 ELEVATED SERUM CREATININE: ICD-10-CM

## 2022-12-12 DIAGNOSIS — G89.29 CHRONIC PAIN OF MULTIPLE JOINTS: Primary | ICD-10-CM

## 2022-12-12 DIAGNOSIS — G80.8 OTHER CEREBRAL PALSY (HCC): ICD-10-CM

## 2022-12-12 DIAGNOSIS — M25.50 CHRONIC PAIN OF MULTIPLE JOINTS: Primary | ICD-10-CM

## 2022-12-12 PROCEDURE — G8427 DOCREV CUR MEDS BY ELIG CLIN: HCPCS | Performed by: NURSE PRACTITIONER

## 2022-12-12 PROCEDURE — 99204 OFFICE O/P NEW MOD 45 MIN: CPT | Performed by: NURSE PRACTITIONER

## 2022-12-12 PROCEDURE — 3017F COLORECTAL CA SCREEN DOC REV: CPT | Performed by: NURSE PRACTITIONER

## 2022-12-12 PROCEDURE — G8417 CALC BMI ABV UP PARAM F/U: HCPCS | Performed by: NURSE PRACTITIONER

## 2022-12-12 PROCEDURE — 3078F DIAST BP <80 MM HG: CPT | Performed by: NURSE PRACTITIONER

## 2022-12-12 PROCEDURE — 3074F SYST BP LT 130 MM HG: CPT | Performed by: NURSE PRACTITIONER

## 2022-12-12 PROCEDURE — 1036F TOBACCO NON-USER: CPT | Performed by: NURSE PRACTITIONER

## 2022-12-12 PROCEDURE — G8484 FLU IMMUNIZE NO ADMIN: HCPCS | Performed by: NURSE PRACTITIONER

## 2022-12-12 RX ORDER — TRAMADOL HYDROCHLORIDE 50 MG/1
50 TABLET ORAL 2 TIMES DAILY PRN
Qty: 60 TABLET | Refills: 0 | Status: SHIPPED | OUTPATIENT
Start: 2022-12-12 | End: 2023-01-11

## 2022-12-12 ASSESSMENT — ENCOUNTER SYMPTOMS
SHORTNESS OF BREATH: 0
COUGH: 0

## 2022-12-12 NOTE — PROGRESS NOTES
Subjective:      Patient ID: Anne Sy is a 62 y.o. male. Chief Complaint   Patient presents with    New Patient     Refer Dr Jesse Yañez    Joint Pain     Neck shoulder hip and and hands     HPI Initial new patient consult. Previously taking diclofenac with relief, due to renal impairment had to stop taking, pain increased    Pain Assessment  Location of Pain: Other (Comment)  Location Modifiers:  (joints -)  Severity of Pain: 7  Quality of Pain: Sharp, Throbbing, Dull, Aching (pressure)  Duration of Pain: Persistent  Frequency of Pain: Constant  Aggravating Factors: Stairs, Walking, Standing, Squatting, Kneeling, Exercise, Straightening, Stretching, Bending  Limiting Behavior: Yes  Relieving Factors: Rest (meds)  Result of Injury: No  Work-Related Injury: No    Allergies   Allergen Reactions    Fenofibrate Rash       Outpatient Medications Marked as Taking for the 12/12/22 encounter (Office Visit) with JADON Godoy CNP   Medication Sig Dispense Refill    traMADol (ULTRAM) 50 MG tablet Take 1 tablet by mouth 2 times daily as needed for Pain for up to 30 days. 60 tablet 0    lisinopril-hydroCHLOROthiazide (PRINZIDE;ZESTORETIC) 20-25 MG per tablet TAKE 1 TABLET BY MOUTH DAILY 90 tablet 3    cyclobenzaprine (FLEXERIL) 10 MG tablet TAKE 1 TABLET BY MOUTH TWICE DAILY AS NEEDED 180 tablet 3    metoprolol tartrate (LOPRESSOR) 50 MG tablet TAKE 1 TABLET BY MOUTH TWICE DAILY 180 tablet 3    SITagliptin (JANUVIA) 50 MG tablet TAKE 1 TABLET BY MOUTH DAILY 90 tablet 3    Dulaglutide (TRULICITY) 1.07 MM/3.6EH SOPN Inject 0.75 mg into the skin once a week 6 mL 3    gemfibrozil (LOPID) 600 MG tablet Take 1 tablet by mouth 2 times daily 180 tablet 3    metFORMIN (GLUCOPHAGE-XR) 500 MG extended release tablet TAKE 2 TABLETS BY MOUTH TWICE DAILY 360 tablet 3    blood glucose monitor strips TEST GLUCOSE ONCE DAILY. Please fill per insurance coverage. 100 strip 3    Misc.  Devices [facility]) MISC Use daily 1 each 0 ONETOUCH DELICA LANCETS FINE MISC TEST THREE TIMES WEEKLY AS DIRECTED 100 each 0    aspirin 81 MG tablet Take 81 mg by mouth daily. omeprazole (PRILOSEC) 20 MG delayed release capsule Take 20 mg by mouth daily          Past Medical History:   Diagnosis Date    Arthritis     Carpal tunnel syndrome     Bilateral.    Cerebral palsy (HCC)     Chest pain     Atypical, history of. Chronic arthralgias of knees and hips     Degenerative disc disease     Lumbar. Diabetes mellitus, type 2 (HCC)     Hip joint valgus deformity     Right hip secondary to muscle imbalance and cerebral palsy. History of blood transfusion     History of kidney stones     Hypertension     Pyelonephritis 10/14/1988    Reflux     Sleep apnea     Spasticity     Severe, lower extremities, with multiple procedures to the hips and knees. Ureteral calculi 1991    Right with obstruction. Past Surgical History:   Procedure Laterality Date    CARPAL TUNNEL RELEASE Right 2009    CHOLECYSTECTOMY  2016    laparscopic w/op grams    COLONOSCOPY  10/1/15    normal    HIP OSTEOTOMY Left     HIP OSTEOTOMY Right     Varus derotation. LITHOTRIPSY Right 1991    With cystoscopy and ureteral catheter placement. LUMBAR FUSION  2005    L4-L5. OTHER SURGICAL HISTORY      pelvic surgery    TOTAL HIP ARTHROPLASTY Left        Family History   Problem Relation Age of Onset    Heart Disease Mother     Heart Disease Father     Heart Attack Brother         age 46    Heart Disease Paternal Uncle        Social History     Socioeconomic History    Marital status:      Spouse name: None    Number of children: None    Years of education: None    Highest education level: None   Tobacco Use    Smoking status: Former     Types: Cigarettes, Cigars     Quit date: 10/9/1999     Years since quittin.1    Smokeless tobacco: Never    Tobacco comments:     quit smoking 10/9/1999. Ana Erazo P, 2016. Substance and Sexual Activity    Alcohol use: Yes     Types: 12 Standard drinks or equivalent per week     Comment: during summer. very occasional during the wintwr    Drug use: No     Social Determinants of Health     Physical Activity: Unknown    Days of Exercise per Week: 0 days       Review of Systems   Constitutional:  Negative for fever. Respiratory:  Negative for cough and shortness of breath. Cardiovascular:  Negative for chest pain. Genitourinary:  Negative for difficulty urinating. Musculoskeletal:  Positive for arthralgias and joint swelling. Objective:   Physical Exam  Vitals reviewed. Constitutional:       General: He is awake. He is not in acute distress. Appearance: Normal appearance. He is well-developed and well-groomed. He is obese. He is not ill-appearing, toxic-appearing or diaphoretic. Interventions: He is not intubated. HENT:      Head: Normocephalic and atraumatic. Right Ear: External ear normal.      Left Ear: External ear normal.      Nose: Nose normal.      Mouth/Throat:      Lips: Pink. Mouth: Mucous membranes are moist.   Eyes:      General: Lids are normal.   Pulmonary:      Effort: Pulmonary effort is normal. No tachypnea, bradypnea, accessory muscle usage, prolonged expiration, respiratory distress or retractions. He is not intubated. Abdominal:      Palpations: Abdomen is soft. Musculoskeletal:      Comments: Sitting in power chair   Skin:     General: Skin is warm and dry. Capillary Refill: Capillary refill takes less than 2 seconds. Coloration: Skin is not ashen, jaundiced or pale. Findings: No rash. Nails: There is no clubbing. Neurological:      Mental Status: He is alert and oriented to person, place, and time. Cranial Nerves: No cranial nerve deficit.    Psychiatric:         Attention and Perception: Attention and perception normal.         Mood and Affect: Mood normal.         Speech: Speech normal. Behavior: Behavior is cooperative. Cognition and Memory: Cognition normal.         Judgment: Judgment normal.       Assessment / Plan:      Diagnosis Orders   1. Chronic pain of multiple joints  traMADol (ULTRAM) 50 MG tablet      2. Elevated serum creatinine        3. Other cerebral palsy ([address]          Start tramadol 50mg bid prn pain    Controlled Substance Monitoring:    Acute and Chronic Pain Monitoring:   RX Monitoring 12/12/2022   Attestation -   Periodic Controlled Substance Monitoring No signs of potential drug abuse or diversion identified.

## 2023-01-03 ENCOUNTER — TELEPHONE (OUTPATIENT)
Dept: PAIN MANAGEMENT | Age: 58
End: 2023-01-03

## 2023-01-03 NOTE — TELEPHONE ENCOUNTER
Pt called to cancel his next appointment and does not wish to reschedule. Pt stated the Tramodol made him drowsy and he \"does not want to go that route. \" Pt denied wanting to schedule another appt.

## 2023-01-11 DIAGNOSIS — E11.9 TYPE 2 DIABETES MELLITUS WITHOUT COMPLICATION, WITHOUT LONG-TERM CURRENT USE OF INSULIN (HCC): ICD-10-CM

## 2023-01-11 NOTE — TELEPHONE ENCOUNTER
Vanda Duran called requesting a refill of the below medication which has been pended for you:     Requested Prescriptions     Pending Prescriptions Disp Refills    metFORMIN (GLUCOPHAGE-XR) 500 MG extended release tablet [Pharmacy Med Name: METFORMIN ER 500MG 24HR TABS] 360 tablet 3     Sig: TAKE 2 TABLETS BY MOUTH TWICE DAILY       Last Appointment Date: 10/4/2022  Next Appointment Date: 4/4/2023    Allergies   Allergen Reactions    Fenofibrate Rash

## 2023-01-14 RX ORDER — METFORMIN HYDROCHLORIDE 500 MG/1
TABLET, EXTENDED RELEASE ORAL
Qty: 360 TABLET | Refills: 3 | Status: SHIPPED | OUTPATIENT
Start: 2023-01-14

## 2023-01-26 ENCOUNTER — TELEPHONE (OUTPATIENT)
Dept: FAMILY MEDICINE CLINIC | Age: 58
End: 2023-01-26

## 2023-02-09 ENCOUNTER — OFFICE VISIT (OUTPATIENT)
Dept: PODIATRY | Age: 58
End: 2023-02-09

## 2023-02-09 VITALS — DIASTOLIC BLOOD PRESSURE: 64 MMHG | HEART RATE: 72 BPM | RESPIRATION RATE: 20 BRPM | SYSTOLIC BLOOD PRESSURE: 118 MMHG

## 2023-02-09 DIAGNOSIS — B35.1 DERMATOPHYTOSIS OF NAIL: ICD-10-CM

## 2023-02-09 DIAGNOSIS — E11.51 CONTROLLED TYPE 2 DM WITH PERIPHERAL CIRCULATORY DISORDER (HCC): Primary | ICD-10-CM

## 2023-02-09 NOTE — PROGRESS NOTES
Foot Care Worksheet  PCP: Rosario Bgigs DO  Last visit:  10 / 04 / 2022    Nail description: Thick , Yellow , Crumbly , Marked limitation of ambulation     Pain resulting from thickened and dystrophy of nail plate No    Nails involved  Right   1, 2, 3, 4, 5  (T5-T9)  Left     1, 2, 3, 4, 5  (TA-T4)    Q7 1 Class A Finding - Non traumatic amputation of foot No    Q8 2 Class B Findings - Absent DP pulse No, Absent PT pulse No, Advanced tropic changes (3 required) Yes    Decrease hair growth No, Nail changes/thickening Yes, Pigmented changes/discoloration Yes, Skin texture (thin, shiny) Yes, Skin color (rubor/redness) No    Q9 1 Class B and 2 Class C Findings  Claudication No, Temperature change Yes, Paresthesia Yes, Burning No, Edema Yes    Subjective:  Gearldean Castleman is a 62 y.o. male who presents to the office today for routine DM foot care. Currently denies F/C/N/V. Allergies   Allergen Reactions    Fenofibrate Rash       Past Medical History:   Diagnosis Date    Arthritis     Carpal tunnel syndrome     Bilateral.    Cerebral palsy (HCC)     Chest pain     Atypical, history of. Chronic arthralgias of knees and hips     Degenerative disc disease     Lumbar. Diabetes mellitus, type 2 (HCC)     Hip joint valgus deformity     Right hip secondary to muscle imbalance and cerebral palsy. History of blood transfusion     History of kidney stones     Hypertension     Pyelonephritis 10/14/1988    Reflux     Sleep apnea     Spasticity     Severe, lower extremities, with multiple procedures to the hips and knees. Ureteral calculi 06/04/1991    Right with obstruction. Prior to Admission medications    Medication Sig Start Date End Date Taking?  Authorizing Provider   metFORMIN (GLUCOPHAGE-XR) 500 MG extended release tablet TAKE 2 TABLETS BY MOUTH TWICE DAILY 1/14/23  Yes Joe Thakkar,    lisinopril-hydroCHLOROthiazide (PRINZIDE;ZESTORETIC) 20-25 MG per tablet TAKE 1 TABLET BY MOUTH DAILY 8/15/22  Yes Sheridan Thakkar, DO   cyclobenzaprine (FLEXERIL) 10 MG tablet TAKE 1 TABLET BY MOUTH TWICE DAILY AS NEEDED 22  Yes Sheridan Thakkar, DO   metoprolol tartrate (LOPRESSOR) 50 MG tablet TAKE 1 TABLET BY MOUTH TWICE DAILY 22  Yes Sheridan Thakkar, DO   SITagliptin (JANUVIA) 50 MG tablet TAKE 1 TABLET BY MOUTH DAILY 22  Yes Sheridan Thakkar, DO   Dulaglutide (TRULICITY) 6.80 LK/3.5YR SOPN Inject 0.75 mg into the skin once a week 22  Yes Sheridan Thakkar, DO   gemfibrozil (LOPID) 600 MG tablet Take 1 tablet by mouth 2 times daily 22  Yes Gilda Thakkar, DO   blood glucose monitor strips TEST GLUCOSE ONCE DAILY. Please fill per insurance coverage. 3/9/21  Yes Merlin Hemp, DO   Misc. Devices [facility]) MISC Use daily 20  Yes Sheridan Thakkar, DO   ONETOUCH DELICA LANCETS FINE MISC TEST THREE TIMES WEEKLY AS DIRECTED 3/23/18  Yes Sheridan Thakkar, DO   aspirin 81 MG tablet Take 81 mg by mouth daily. Yes Historical Provider, MD   omeprazole (PRILOSEC) 20 MG delayed release capsule Take 20 mg by mouth daily    Yes Historical Provider, MD       Past Surgical History:   Procedure Laterality Date    CARPAL TUNNEL RELEASE Right 2009    CHOLECYSTECTOMY  2016    laparscopic w/op grams    COLONOSCOPY  10/1/15    normal    HIP OSTEOTOMY Left     HIP OSTEOTOMY Right     Varus derotation. LITHOTRIPSY Right 1991    With cystoscopy and ureteral catheter placement. LUMBAR FUSION  2005    L4-L5. OTHER SURGICAL HISTORY      pelvic surgery    TOTAL HIP ARTHROPLASTY Left        Family History   Problem Relation Age of Onset    Heart Disease Mother     Heart Disease Father     Heart Attack Brother         age 46    Heart Disease Paternal Uncle        Social History     Tobacco Use    Smoking status: Former     Types: Cigarettes, Cigars     Quit date: 10/9/1999     Years since quittin.3    Smokeless tobacco: Never    Tobacco comments:     quit smoking 10/9/1999.  Marie VENTURAP, 9/14/2016. Substance Use Topics    Alcohol use: Yes     Types: 12 Standard drinks or equivalent per week     Comment: during summer. very occasional during the wintwr       Review of Systems: All 12 systems reviewed and pertinent positives noted above. Lower Extremity Physical Examination:     Vitals:   Vitals:    02/09/23 0748   BP: 118/64   Pulse: 72   Resp: 20     General: AAO x 3 in NAD. Vascular: DP and PT pulses palpable 2/4, bilateral.  CFT <3 seconds, bilateral.  Hair growth present to the level of the digits, bilateral.  Edema present, bilateral.  Varicosities present, bilateral. Erythema absent, bilateral. Distal Rubor absent bilateral.  Temperature within normal limits bilateral. Hyperpigmentation present bilateral. Atrophic skin yes. Neurological: Sensation Impaired to light touch to level of digits, bilateral.  Protective sensation intact  0/10 sites via 5.07/10g [place]Aj Monofilament, bilateral.  negative Tinel's, bilateral.  negative Valleix sign, bilateral.  Vibratory abnormal  bilateral.  Reflexes Decreased bilateral.  Paresthesias positive. Dysthesias negative. Sharp/dull abnormal  bilateral.  Musculoskeletal: Muscle strength 4/5, bilateral.  Pain absent upon palpation bilateral. Normal medial longitudinal arch, bilateral.  Ankle ROM decreased,bilateral.  1st MPJ ROM within normal limits, bilateral.  Dorsally contracted digits absent. No other foot deformities. Integument:   Open lesion present distal L hallux. Interdigital maceration absent to web spaces absent, Bilateral.  Nails left 1, 2, 3, 4, 5 and right 1, 2, 3, 4, 5 thickened, dystrophic and crumbly, discolored with subungual debris. Fissures absent, Bilateral. Hyperkeratotic tissue is absent. Asessment: Patient is a 62 y.o. male with:    Diagnosis Orders   1. Controlled type 2 DM with peripheral circulatory disorder ([facility] [address]        2. Dermatophytosis of nail            Plan: Patient examined and evaluated.   Current condition and treatment options discussed in detail. DM foot ed and exam.    All nails as mentioned above debrided in length and thickness. Patient advised OTC methods for treatment of the mycotic nails. Patient will follow up in 10 weeks. Contact office with any questions/problems/concerns.

## 2023-02-22 DIAGNOSIS — E11.9 TYPE 2 DIABETES MELLITUS WITHOUT COMPLICATION, WITHOUT LONG-TERM CURRENT USE OF INSULIN (HCC): Primary | ICD-10-CM

## 2023-02-22 DIAGNOSIS — E11.9 TYPE 2 DIABETES MELLITUS WITHOUT COMPLICATION, WITHOUT LONG-TERM CURRENT USE OF INSULIN (HCC): ICD-10-CM

## 2023-02-22 NOTE — TELEPHONE ENCOUNTER
Has enough for about 2 weeks. OK to wait until KB returns.     Ilya Norton called requesting a refill of the below medication which has been pended for you:     Requested Prescriptions     Pending Prescriptions Disp Refills    TRULICITY 7.98 BZ/2.8IJ SOPN [Pharmacy Med Name: Pernell Woo 6.53JZ/6.7JE SDP 0.5ML] 6 mL 3     Sig: ADMINISTER 0.75 MG UNDER THE SKIN 1 TIME A WEEK       Last Appointment Date: 10/4/2022  Next Appointment Date: 4/4/2023    Allergies   Allergen Reactions    Fenofibrate Rash

## 2023-03-01 RX ORDER — DULAGLUTIDE 0.75 MG/.5ML
0.75 INJECTION, SOLUTION SUBCUTANEOUS WEEKLY
Qty: 2 ML | Refills: 0 | Status: SHIPPED | OUTPATIENT
Start: 2023-03-01

## 2023-03-27 DIAGNOSIS — E11.9 TYPE 2 DIABETES MELLITUS WITHOUT COMPLICATION, WITHOUT LONG-TERM CURRENT USE OF INSULIN (HCC): ICD-10-CM

## 2023-03-28 NOTE — TELEPHONE ENCOUNTER
Ifeoma Dominguez called requesting a refill of the below medication which has been pended for you:     Requested Prescriptions     Pending Prescriptions Disp Refills    TRULICITY 0.97 BL/5.7MJ SOPN [Pharmacy Med Name: Emani Womack 6.53ZY/5.6KR SDP 0.5ML] 2 mL 0     Sig: ADMINISTER 0.75 MG UNDER THE SKIN 1 TIME A WEEK       Last Appointment Date: 10/4/2022  Next Appointment Date: 4/4/2023    Allergies   Allergen Reactions    Fenofibrate Rash

## 2023-04-01 NOTE — TELEPHONE ENCOUNTER
Pt is due for re-check of A1c now and has OV on 4/4 - does he have enough until then? Please remind him to get A1c checked prior to appt, if possible.

## 2023-04-03 RX ORDER — DULAGLUTIDE 0.75 MG/.5ML
INJECTION, SOLUTION SUBCUTANEOUS
Qty: 2 ML | Refills: 0 | OUTPATIENT
Start: 2023-04-03

## 2023-04-04 ENCOUNTER — HOSPITAL ENCOUNTER (OUTPATIENT)
Age: 58
Discharge: HOME | End: 2023-04-04
Payer: COMMERCIAL

## 2023-04-04 ENCOUNTER — OFFICE VISIT (OUTPATIENT)
Dept: FAMILY MEDICINE CLINIC | Age: 58
End: 2023-04-04
Payer: COMMERCIAL

## 2023-04-04 VITALS
HEIGHT: 72 IN | SYSTOLIC BLOOD PRESSURE: 124 MMHG | WEIGHT: 264 LBS | HEART RATE: 90 BPM | OXYGEN SATURATION: 99 % | TEMPERATURE: 97.6 F | BODY MASS INDEX: 35.76 KG/M2 | DIASTOLIC BLOOD PRESSURE: 72 MMHG

## 2023-04-04 DIAGNOSIS — G80.8 OTHER CEREBRAL PALSY (HCC): ICD-10-CM

## 2023-04-04 DIAGNOSIS — R79.89 ELEVATED SERUM CREATININE: ICD-10-CM

## 2023-04-04 DIAGNOSIS — E78.1 HYPERTRIGLYCERIDEMIA: ICD-10-CM

## 2023-04-04 DIAGNOSIS — E11.9 TYPE 2 DIABETES MELLITUS WITHOUT COMPLICATION, WITHOUT LONG-TERM CURRENT USE OF INSULIN (HCC): Primary | ICD-10-CM

## 2023-04-04 DIAGNOSIS — I10 ESSENTIAL HYPERTENSION: ICD-10-CM

## 2023-04-04 DIAGNOSIS — M62.838 MUSCLE SPASTICITY: ICD-10-CM

## 2023-04-04 DIAGNOSIS — E11.9 TYPE 2 DIABETES MELLITUS WITHOUT COMPLICATION, WITHOUT LONG-TERM CURRENT USE OF INSULIN (HCC): ICD-10-CM

## 2023-04-04 LAB
ANION GAP SERPL CALCULATED.3IONS-SCNC: 14 MMOL/L (ref 9–17)
BUN SERPL-MCNC: 12 MG/DL (ref 6–20)
BUN/CREAT BLD: 10 (ref 9–20)
CALCIUM SERPL-MCNC: 9.9 MG/DL (ref 8.6–10.4)
CHLORIDE SERPL-SCNC: 98 MMOL/L (ref 98–107)
CHOLEST SERPL-MCNC: 163 MG/DL
CHOLESTEROL/HDL RATIO: 6.8
CO2 SERPL-SCNC: 25 MMOL/L (ref 20–31)
CREAT SERPL-MCNC: 1.2 MG/DL (ref 0.7–1.2)
EST. AVERAGE GLUCOSE BLD GHB EST-MCNC: 180 MG/DL
GFR SERPL CREATININE-BSD FRML MDRD: >60 ML/MIN/1.73M2
GLUCOSE SERPL-MCNC: 178 MG/DL (ref 70–99)
HBA1C MFR BLD: 7.9 % (ref 4–6)
HDLC SERPL-MCNC: 24 MG/DL
LDLC SERPL CALC-MCNC: 102 MG/DL (ref 0–130)
POTASSIUM SERPL-SCNC: 4.3 MMOL/L (ref 3.7–5.3)
SODIUM SERPL-SCNC: 137 MMOL/L (ref 135–144)
TRIGL SERPL-MCNC: 187 MG/DL

## 2023-04-04 PROCEDURE — G8427 DOCREV CUR MEDS BY ELIG CLIN: HCPCS | Performed by: FAMILY MEDICINE

## 2023-04-04 PROCEDURE — 3017F COLORECTAL CA SCREEN DOC REV: CPT | Performed by: FAMILY MEDICINE

## 2023-04-04 PROCEDURE — 83036 HEMOGLOBIN GLYCOSYLATED A1C: CPT

## 2023-04-04 PROCEDURE — 80061 LIPID PANEL: CPT

## 2023-04-04 PROCEDURE — 1036F TOBACCO NON-USER: CPT | Performed by: FAMILY MEDICINE

## 2023-04-04 PROCEDURE — G8417 CALC BMI ABV UP PARAM F/U: HCPCS | Performed by: FAMILY MEDICINE

## 2023-04-04 PROCEDURE — 80048 BASIC METABOLIC PNL TOTAL CA: CPT

## 2023-04-04 PROCEDURE — 2022F DILAT RTA XM EVC RTNOPTHY: CPT | Performed by: FAMILY MEDICINE

## 2023-04-04 PROCEDURE — 36415 COLL VENOUS BLD VENIPUNCTURE: CPT

## 2023-04-04 PROCEDURE — 3051F HG A1C>EQUAL 7.0%<8.0%: CPT | Performed by: FAMILY MEDICINE

## 2023-04-04 PROCEDURE — 3078F DIAST BP <80 MM HG: CPT | Performed by: FAMILY MEDICINE

## 2023-04-04 PROCEDURE — 99214 OFFICE O/P EST MOD 30 MIN: CPT | Performed by: FAMILY MEDICINE

## 2023-04-04 PROCEDURE — 3074F SYST BP LT 130 MM HG: CPT | Performed by: FAMILY MEDICINE

## 2023-04-04 RX ORDER — GEMFIBROZIL 600 MG/1
600 TABLET, FILM COATED ORAL 2 TIMES DAILY
Qty: 180 TABLET | Refills: 3 | Status: CANCELLED | OUTPATIENT
Start: 2023-04-04

## 2023-04-04 SDOH — ECONOMIC STABILITY: INCOME INSECURITY: HOW HARD IS IT FOR YOU TO PAY FOR THE VERY BASICS LIKE FOOD, HOUSING, MEDICAL CARE, AND HEATING?: NOT HARD AT ALL

## 2023-04-04 SDOH — ECONOMIC STABILITY: HOUSING INSECURITY
IN THE LAST 12 MONTHS, WAS THERE A TIME WHEN YOU DID NOT HAVE A STEADY PLACE TO SLEEP OR SLEPT IN A SHELTER (INCLUDING NOW)?: NO

## 2023-04-04 SDOH — ECONOMIC STABILITY: FOOD INSECURITY: WITHIN THE PAST 12 MONTHS, YOU WORRIED THAT YOUR FOOD WOULD RUN OUT BEFORE YOU GOT MONEY TO BUY MORE.: NEVER TRUE

## 2023-04-04 SDOH — ECONOMIC STABILITY: FOOD INSECURITY: WITHIN THE PAST 12 MONTHS, THE FOOD YOU BOUGHT JUST DIDN'T LAST AND YOU DIDN'T HAVE MONEY TO GET MORE.: NEVER TRUE

## 2023-04-04 ASSESSMENT — PATIENT HEALTH QUESTIONNAIRE - PHQ9
2. FEELING DOWN, DEPRESSED OR HOPELESS: 0
SUM OF ALL RESPONSES TO PHQ QUESTIONS 1-9: 0
SUM OF ALL RESPONSES TO PHQ QUESTIONS 1-9: 0
1. LITTLE INTEREST OR PLEASURE IN DOING THINGS: 0
SUM OF ALL RESPONSES TO PHQ QUESTIONS 1-9: 0
SUM OF ALL RESPONSES TO PHQ QUESTIONS 1-9: 0
SUM OF ALL RESPONSES TO PHQ9 QUESTIONS 1 & 2: 0

## 2023-04-04 ASSESSMENT — ENCOUNTER SYMPTOMS
DIARRHEA: 0
BLOOD IN STOOL: 0
CONSTIPATION: 0

## 2023-04-17 DIAGNOSIS — E11.9 TYPE 2 DIABETES MELLITUS WITHOUT COMPLICATION, WITHOUT LONG-TERM CURRENT USE OF INSULIN (HCC): ICD-10-CM

## 2023-04-17 RX ORDER — DULAGLUTIDE 0.75 MG/.5ML
0.75 INJECTION, SOLUTION SUBCUTANEOUS WEEKLY
Qty: 2 ML | Refills: 0 | Status: CANCELLED | OUTPATIENT
Start: 2023-04-17

## 2023-04-17 NOTE — TELEPHONE ENCOUNTER
See patient message 4/16. Patient questions if he is still suppose to be on trulicity and if so he is out.          Cruz Dunlap called requesting a refill of the below medication which has been pended for you:     Requested Prescriptions     Pending Prescriptions Disp Refills    Dulaglutide (TRULICITY) 1.65 IX/4.0BJ SOPN 2 mL 0     Sig: Inject 0.75 mg into the skin once a week       Last Appointment Date: 4/4/2023  Next Appointment Date: 10/4/2023    Allergies   Allergen Reactions    Fenofibrate Rash

## 2023-04-20 ENCOUNTER — OFFICE VISIT (OUTPATIENT)
Dept: PODIATRY | Age: 58
End: 2023-04-20
Payer: COMMERCIAL

## 2023-04-20 VITALS — HEART RATE: 76 BPM | RESPIRATION RATE: 20 BRPM | DIASTOLIC BLOOD PRESSURE: 70 MMHG | SYSTOLIC BLOOD PRESSURE: 126 MMHG

## 2023-04-20 DIAGNOSIS — B35.1 DERMATOPHYTOSIS OF NAIL: ICD-10-CM

## 2023-04-20 DIAGNOSIS — E11.51 CONTROLLED TYPE 2 DM WITH PERIPHERAL CIRCULATORY DISORDER (HCC): Primary | ICD-10-CM

## 2023-04-20 PROCEDURE — 11721 DEBRIDE NAIL 6 OR MORE: CPT | Performed by: PODIATRIST

## 2023-04-20 RX ORDER — DULAGLUTIDE 1.5 MG/.5ML
1.5 INJECTION, SOLUTION SUBCUTANEOUS WEEKLY
Qty: 6 ML | Refills: 0 | Status: SHIPPED | OUTPATIENT
Start: 2023-04-20

## 2023-04-20 NOTE — PROGRESS NOTES
Foot Care Worksheet  PCP: Kelly President,   Last visit:  04 / 04 / 2023    Nail description: Thick , Yellow , Crumbly , Marked limitation of ambulation     Pain resulting from thickened and dystrophy of nail plate No    Nails involved  Right   1, 2, 3, 4, 5  (T5-T9)  Left     1, 2, 3, 4, 5  (TA-T4)    Q7 1 Class A Finding - Non traumatic amputation of foot No    Q8 2 Class B Findings - Absent DP pulse No, Absent PT pulse No, Advanced tropic changes (3 required) Yes    Decrease hair growth No, Nail changes/thickening Yes, Pigmented changes/discoloration Yes, Skin texture (thin, shiny) Yes, Skin color (rubor/redness) No    Q9 1 Class B and 2 Class C Findings  Claudication No, Temperature change Yes, Paresthesia Yes, Burning No, Edema Yes    Subjective:  Collin Poe is a 62 y.o. male who presents to the office today for routine DM foot care. Currently denies F/C/N/V. Allergies   Allergen Reactions    Fenofibrate Rash       Past Medical History:   Diagnosis Date    Arthritis     Carpal tunnel syndrome     Bilateral.    Cerebral palsy (HCC)     Chest pain     Atypical, history of. Chronic arthralgias of knees and hips     Degenerative disc disease     Lumbar. Diabetes mellitus, type 2 (HCC)     Hip joint valgus deformity     Right hip secondary to muscle imbalance and cerebral palsy. History of blood transfusion     History of kidney stones     Hypertension     Pyelonephritis 10/14/1988    Reflux     Sleep apnea     Spasticity     Severe, lower extremities, with multiple procedures to the hips and knees. Ureteral calculi 06/04/1991    Right with obstruction. Prior to Admission medications    Medication Sig Start Date End Date Taking?  Authorizing Provider   gemfibrozil (LOPID) 600 MG tablet Take 1 tablet by mouth 2 times daily 4/13/23  Yes Linette Thakkar,    Dulaglutide (TRULICITY) 5.64 WH/3.3CZ SOPN Inject 0.75 mg into the skin once a week 3/1/23  Yes Linette Thakkar DO   metFORMIN

## 2023-04-20 NOTE — TELEPHONE ENCOUNTER
Patient made aware of the medication changes and new dosages. Aware to get a1c rechecked in 3 months. Labs placed.

## 2023-04-20 NOTE — TELEPHONE ENCOUNTER
Please let pt know that due to A1c increasing a little to 7.9%, we will increase his Trulicity to 1.5 mg SQ once weekly - new dose sent to pharmacy now. He can wait to start this until Sunday, if that is his regular day for weekly injection. Will have his discontinue his Januvia when he is out of current supply. Will re-check his A1c in 3 months.

## 2023-05-31 ENCOUNTER — TELEPHONE (OUTPATIENT)
Dept: FAMILY MEDICINE CLINIC | Age: 58
End: 2023-05-31

## 2023-06-29 ENCOUNTER — OFFICE VISIT (OUTPATIENT)
Dept: PODIATRY | Age: 58
End: 2023-06-29
Payer: COMMERCIAL

## 2023-06-29 VITALS — DIASTOLIC BLOOD PRESSURE: 70 MMHG | SYSTOLIC BLOOD PRESSURE: 128 MMHG | HEART RATE: 72 BPM | RESPIRATION RATE: 20 BRPM

## 2023-06-29 DIAGNOSIS — E11.51 CONTROLLED TYPE 2 DM WITH PERIPHERAL CIRCULATORY DISORDER (HCC): Primary | ICD-10-CM

## 2023-06-29 DIAGNOSIS — B35.1 DERMATOPHYTOSIS OF NAIL: ICD-10-CM

## 2023-06-29 PROCEDURE — 99999 PR OFFICE/OUTPT VISIT,PROCEDURE ONLY: CPT | Performed by: PODIATRIST

## 2023-06-29 PROCEDURE — 11721 DEBRIDE NAIL 6 OR MORE: CPT | Performed by: PODIATRIST

## 2023-07-08 NOTE — TELEPHONE ENCOUNTER
Please advise 2 weeks ago
Please see phone note from 10/27.
Pt calling on status of power chart, states he was seen on the 1st and hasn't heard anything back about this, please advise.
No

## 2023-07-10 DIAGNOSIS — I10 ESSENTIAL HYPERTENSION: ICD-10-CM

## 2023-07-10 NOTE — TELEPHONE ENCOUNTER
Brenton Amado called requesting a refill of the below medication which has been pended for you:     Requested Prescriptions     Pending Prescriptions Disp Refills    metoprolol tartrate (LOPRESSOR) 50 MG tablet [Pharmacy Med Name: METOPROLOL TARTRATE 50MG TABLETS] 180 tablet 3     Sig: TAKE 1 TABLET BY MOUTH TWICE DAILY       Last Appointment Date: 4/4/2023  Next Appointment Date: 10/4/2023    Allergies   Allergen Reactions    Fenofibrate Rash

## 2023-07-13 RX ORDER — METOPROLOL TARTRATE 50 MG/1
50 TABLET, FILM COATED ORAL 2 TIMES DAILY
Qty: 180 TABLET | Refills: 3 | Status: SHIPPED | OUTPATIENT
Start: 2023-07-13

## 2023-07-14 DIAGNOSIS — E11.9 TYPE 2 DIABETES MELLITUS WITHOUT COMPLICATION, WITHOUT LONG-TERM CURRENT USE OF INSULIN (HCC): ICD-10-CM

## 2023-07-14 NOTE — TELEPHONE ENCOUNTER
Katya Fontanez called requesting a refill of the below medication which has been pended for you:     Requested Prescriptions     Pending Prescriptions Disp Refills    TRULICITY 1.5 YL/8.1WY SC injection [Pharmacy Med Name: Pebbles Bhakta 4.3IF/0.8ZB SDP 0.5ML] 6 mL 0     Sig: ADMINISTER 1.5 MG UNDER THE SKIN 1 TIME A WEEK       Last Appointment Date: 4/4/2023  Next Appointment Date: 10/4/2023    Allergies   Allergen Reactions    Fenofibrate Rash

## 2023-07-24 NOTE — TELEPHONE ENCOUNTER
Pt has been out of Trulicity since 5/10. Will complete labs this week before next dose is due, takes on Sundays.

## 2023-07-25 ENCOUNTER — HOSPITAL ENCOUNTER (OUTPATIENT)
Age: 58
Discharge: HOME | End: 2023-07-25
Payer: COMMERCIAL

## 2023-07-25 DIAGNOSIS — E11.9 TYPE 2 DIABETES MELLITUS WITHOUT COMPLICATION, WITHOUT LONG-TERM CURRENT USE OF INSULIN (HCC): ICD-10-CM

## 2023-07-25 PROCEDURE — 36415 COLL VENOUS BLD VENIPUNCTURE: CPT

## 2023-07-25 PROCEDURE — 83036 HEMOGLOBIN GLYCOSYLATED A1C: CPT

## 2023-07-26 LAB
EST. AVERAGE GLUCOSE BLD GHB EST-MCNC: 146 MG/DL
HBA1C MFR BLD: 6.7 % (ref 4–6)

## 2023-07-26 RX ORDER — DULAGLUTIDE 1.5 MG/.5ML
1.5 INJECTION, SOLUTION SUBCUTANEOUS WEEKLY
Qty: 6 ML | Refills: 1 | Status: SHIPPED | OUTPATIENT
Start: 2023-07-26

## 2023-08-09 DIAGNOSIS — I10 ESSENTIAL HYPERTENSION: ICD-10-CM

## 2023-08-09 NOTE — TELEPHONE ENCOUNTER
Fercho Thompson called requesting a refill of the below medication which has been pended for you:     Requested Prescriptions     Pending Prescriptions Disp Refills    lisinopril-hydroCHLOROthiazide (PRINZIDE;ZESTORETIC) 20-25 MG per tablet [Pharmacy Med Name: LISINOPRIL-HCTZ 20/25MG TABLETS] 90 tablet 3     Sig: TAKE 1 TABLET BY MOUTH DAILY       Last Appointment Date: 4/4/2023  Next Appointment Date: 10/4/2023    Allergies   Allergen Reactions    Fenofibrate Rash

## 2023-08-10 RX ORDER — LISINOPRIL AND HYDROCHLOROTHIAZIDE 25; 20 MG/1; MG/1
1 TABLET ORAL DAILY
Qty: 90 TABLET | Refills: 3 | Status: SHIPPED | OUTPATIENT
Start: 2023-08-10

## 2023-08-12 ENCOUNTER — HOSPITAL ENCOUNTER (EMERGENCY)
Age: 58
Discharge: HOME | End: 2023-08-13
Attending: EMERGENCY MEDICINE
Payer: COMMERCIAL

## 2023-08-12 ENCOUNTER — APPOINTMENT (OUTPATIENT)
Dept: GENERAL RADIOLOGY | Age: 58
End: 2023-08-12
Payer: COMMERCIAL

## 2023-08-12 VITALS
SYSTOLIC BLOOD PRESSURE: 110 MMHG | WEIGHT: 263 LBS | DIASTOLIC BLOOD PRESSURE: 70 MMHG | RESPIRATION RATE: 18 BRPM | TEMPERATURE: 99 F | BODY MASS INDEX: 34.85 KG/M2 | HEART RATE: 97 BPM | HEIGHT: 73 IN | OXYGEN SATURATION: 97 %

## 2023-08-12 DIAGNOSIS — I47.1 PAROXYSMAL SUPRAVENTRICULAR TACHYCARDIA (HCC): Primary | ICD-10-CM

## 2023-08-12 DIAGNOSIS — N39.0 URINARY TRACT INFECTION WITH HEMATURIA, SITE UNSPECIFIED: ICD-10-CM

## 2023-08-12 DIAGNOSIS — R31.9 URINARY TRACT INFECTION WITH HEMATURIA, SITE UNSPECIFIED: ICD-10-CM

## 2023-08-12 LAB
ALBUMIN SERPL-MCNC: 4.6 G/DL (ref 3.5–5.2)
ALBUMIN/GLOB SERPL: 1.6 {RATIO} (ref 1–2.5)
ALP SERPL-CCNC: 65 U/L (ref 40–129)
ALT SERPL-CCNC: 17 U/L (ref 5–41)
ANION GAP SERPL CALCULATED.3IONS-SCNC: 17 MMOL/L (ref 9–17)
AST SERPL-CCNC: 15 U/L
BACTERIA URNS QL MICRO: ABNORMAL
BASOPHILS # BLD: 0.04 K/UL (ref 0–0.2)
BASOPHILS NFR BLD: 0 % (ref 0–2)
BILIRUB SERPL-MCNC: 0.5 MG/DL (ref 0.3–1.2)
BILIRUB UR QL STRIP: NEGATIVE
BNP SERPL-MCNC: <20 PG/ML
BUN SERPL-MCNC: 11 MG/DL (ref 6–20)
BUN/CREAT SERPL: 11 (ref 9–20)
CALCIUM SERPL-MCNC: 10.3 MG/DL (ref 8.6–10.4)
CHARACTER UR: ABNORMAL
CHLORIDE SERPL-SCNC: 96 MMOL/L (ref 98–107)
CLARITY UR: CLEAR
CO2 SERPL-SCNC: 22 MMOL/L (ref 20–31)
COLOR UR: YELLOW
CREAT SERPL-MCNC: 1 MG/DL (ref 0.7–1.2)
D DIMER PPP FEU-MCNC: 0.46 UG/ML FEU (ref 0–0.59)
EOSINOPHIL # BLD: 0.04 K/UL (ref 0–0.44)
EOSINOPHILS RELATIVE PERCENT: 0 % (ref 1–4)
EPI CELLS #/AREA URNS HPF: ABNORMAL /HPF (ref 0–5)
ERYTHROCYTE [DISTWIDTH] IN BLOOD BY AUTOMATED COUNT: 15 % (ref 11.8–14.4)
GFR SERPL CREATININE-BSD FRML MDRD: >60 ML/MIN/1.73M2
GLUCOSE SERPL-MCNC: 204 MG/DL (ref 70–99)
GLUCOSE UR STRIP-MCNC: NEGATIVE MG/DL
HCT VFR BLD AUTO: 39.8 % (ref 40.7–50.3)
HGB BLD-MCNC: 12.5 G/DL (ref 13–17)
HGB UR QL STRIP.AUTO: ABNORMAL
IMM GRANULOCYTES # BLD AUTO: 0.06 K/UL (ref 0–0.3)
IMM GRANULOCYTES NFR BLD: 0 %
KETONES UR STRIP-MCNC: NEGATIVE MG/DL
LEUKOCYTE ESTERASE UR QL STRIP: ABNORMAL
LYMPHOCYTES NFR BLD: 1.45 K/UL (ref 1.1–3.7)
LYMPHOCYTES RELATIVE PERCENT: 10 % (ref 24–43)
MCH RBC QN AUTO: 22.9 PG (ref 25.2–33.5)
MCHC RBC AUTO-ENTMCNC: 31.4 G/DL (ref 25.2–33.5)
MCV RBC AUTO: 73 FL (ref 82.6–102.9)
MONOCYTES NFR BLD: 0.94 K/UL (ref 0.1–1.2)
MONOCYTES NFR BLD: 7 % (ref 3–12)
NEUTROPHILS NFR BLD: 83 % (ref 36–65)
NEUTS SEG NFR BLD: 11.9 K/UL (ref 1.5–8.1)
NITRITE UR QL STRIP: NEGATIVE
NRBC BLD-RTO: 0 PER 100 WBC
PARTIAL THROMBOPLASTIN TIME: 28.5 SEC (ref 23.9–33.8)
PH UR STRIP: 5.5 [PH] (ref 5–6)
PLATELET # BLD AUTO: 351 K/UL (ref 138–453)
PMV BLD AUTO: 9 FL (ref 8.1–13.5)
POTASSIUM SERPL-SCNC: 3.9 MMOL/L (ref 3.7–5.3)
PROT SERPL-MCNC: 7.5 G/DL (ref 6.4–8.3)
PROT UR STRIP-MCNC: NEGATIVE MG/DL
RBC # BLD AUTO: 5.45 M/UL (ref 4.21–5.77)
RBC # BLD: ABNORMAL 10*6/UL
RBC #/AREA URNS HPF: ABNORMAL /HPF (ref 0–4)
SARS-COV-2 RDRP RESP QL NAA+PROBE: NOT DETECTED
SODIUM SERPL-SCNC: 135 MMOL/L (ref 135–144)
SP GR UR STRIP: 1 (ref 1.01–1.02)
SPECIMEN DESCRIPTION: NORMAL
TROPONIN I SERPL HS-MCNC: 12 NG/L (ref 0–22)
UROBILINOGEN UR STRIP-ACNC: NORMAL EU/DL (ref 0–1)
WBC #/AREA URNS HPF: ABNORMAL /HPF (ref 0–4)
WBC OTHER # BLD: 14.4 K/UL (ref 3.5–11.3)

## 2023-08-12 PROCEDURE — 2500000003 HC RX 250 WO HCPCS: Performed by: EMERGENCY MEDICINE

## 2023-08-12 PROCEDURE — 87086 URINE CULTURE/COLONY COUNT: CPT

## 2023-08-12 PROCEDURE — 83880 ASSAY OF NATRIURETIC PEPTIDE: CPT

## 2023-08-12 PROCEDURE — 99285 EMERGENCY DEPT VISIT HI MDM: CPT

## 2023-08-12 PROCEDURE — 93005 ELECTROCARDIOGRAM TRACING: CPT | Performed by: EMERGENCY MEDICINE

## 2023-08-12 PROCEDURE — 85379 FIBRIN DEGRADATION QUANT: CPT

## 2023-08-12 PROCEDURE — 96374 THER/PROPH/DIAG INJ IV PUSH: CPT

## 2023-08-12 PROCEDURE — 84484 ASSAY OF TROPONIN QUANT: CPT

## 2023-08-12 PROCEDURE — 71045 X-RAY EXAM CHEST 1 VIEW: CPT

## 2023-08-12 PROCEDURE — 87635 SARS-COV-2 COVID-19 AMP PRB: CPT

## 2023-08-12 PROCEDURE — 2580000003 HC RX 258: Performed by: EMERGENCY MEDICINE

## 2023-08-12 PROCEDURE — 87077 CULTURE AEROBIC IDENTIFY: CPT

## 2023-08-12 PROCEDURE — 81001 URINALYSIS AUTO W/SCOPE: CPT

## 2023-08-12 PROCEDURE — 87186 SC STD MICRODIL/AGAR DIL: CPT

## 2023-08-12 PROCEDURE — 85730 THROMBOPLASTIN TIME PARTIAL: CPT

## 2023-08-12 PROCEDURE — 6370000000 HC RX 637 (ALT 250 FOR IP): Performed by: EMERGENCY MEDICINE

## 2023-08-12 PROCEDURE — 85025 COMPLETE CBC W/AUTO DIFF WBC: CPT

## 2023-08-12 PROCEDURE — 80053 COMPREHEN METABOLIC PANEL: CPT

## 2023-08-12 RX ORDER — DILTIAZEM HYDROCHLORIDE 5 MG/ML
20 INJECTION INTRAVENOUS ONCE
Status: DISCONTINUED | OUTPATIENT
Start: 2023-08-12 | End: 2023-08-12

## 2023-08-12 RX ORDER — DILTIAZEM HYDROCHLORIDE 5 MG/ML
20 INJECTION INTRAVENOUS ONCE
Status: DISCONTINUED | OUTPATIENT
Start: 2023-08-12 | End: 2023-08-13 | Stop reason: HOSPADM

## 2023-08-12 RX ORDER — HEPARIN SODIUM 10000 [USP'U]/100ML
5-30 INJECTION, SOLUTION INTRAVENOUS CONTINUOUS
Status: DISCONTINUED | OUTPATIENT
Start: 2023-08-12 | End: 2023-08-12

## 2023-08-12 RX ORDER — ASPIRIN 81 MG/1
324 TABLET, CHEWABLE ORAL ONCE
Status: COMPLETED | OUTPATIENT
Start: 2023-08-12 | End: 2023-08-12

## 2023-08-12 RX ORDER — 0.9 % SODIUM CHLORIDE 0.9 %
1000 INTRAVENOUS SOLUTION INTRAVENOUS ONCE
Status: COMPLETED | OUTPATIENT
Start: 2023-08-12 | End: 2023-08-13

## 2023-08-12 RX ORDER — HEPARIN SODIUM 1000 [USP'U]/ML
4000 INJECTION, SOLUTION INTRAVENOUS; SUBCUTANEOUS ONCE
Status: DISCONTINUED | OUTPATIENT
Start: 2023-08-12 | End: 2023-08-12

## 2023-08-12 RX ORDER — CEPHALEXIN 500 MG/1
500 CAPSULE ORAL 2 TIMES DAILY
Qty: 14 CAPSULE | Refills: 0 | Status: SHIPPED | OUTPATIENT
Start: 2023-08-12 | End: 2023-08-19

## 2023-08-12 RX ORDER — HEPARIN SODIUM 1000 [USP'U]/ML
4000 INJECTION, SOLUTION INTRAVENOUS; SUBCUTANEOUS PRN
Status: DISCONTINUED | OUTPATIENT
Start: 2023-08-12 | End: 2023-08-12

## 2023-08-12 RX ORDER — CEPHALEXIN 250 MG/1
500 CAPSULE ORAL ONCE
Status: COMPLETED | OUTPATIENT
Start: 2023-08-12 | End: 2023-08-12

## 2023-08-12 RX ORDER — HEPARIN SODIUM 1000 [USP'U]/ML
2000 INJECTION, SOLUTION INTRAVENOUS; SUBCUTANEOUS PRN
Status: DISCONTINUED | OUTPATIENT
Start: 2023-08-12 | End: 2023-08-12

## 2023-08-12 RX ADMIN — ASPIRIN 81 MG 324 MG: 81 TABLET ORAL at 22:42

## 2023-08-12 RX ADMIN — CEPHALEXIN 500 MG: 250 CAPSULE ORAL at 23:33

## 2023-08-12 RX ADMIN — DILTIAZEM HYDROCHLORIDE 20 MG: 5 INJECTION INTRAVENOUS at 22:43

## 2023-08-12 RX ADMIN — SODIUM CHLORIDE 1000 ML: 9 INJECTION, SOLUTION INTRAVENOUS at 22:41

## 2023-08-12 ASSESSMENT — PAIN SCALES - GENERAL: PAINLEVEL_OUTOF10: 0

## 2023-08-12 ASSESSMENT — PAIN - FUNCTIONAL ASSESSMENT: PAIN_FUNCTIONAL_ASSESSMENT: 0-10

## 2023-08-13 NOTE — DISCHARGE INSTRUCTIONS
Call to schedule an appointment with the cardiologist within 1-2 days. We spoke with Dr. Nancy Langley in the ER and he recommends scheduling an appointment and a holter monitor. Take your medication as indicated and prescribed. If you are given an antibiotic then, make sure you get the prescription filled and take the antibiotics until finished. Drink plenty of water while taking the antibiotics. Avoid drinking alcohol or drinks that have caffeine in it while taking antibiotics. If you were given the medication pyridium (or take over the counter Azo) - do not wear any contacts for the next week since this medication will turn your tears an orange color and will stain the contacts. For pain use acetaminophen (Tylenol) or ibuprofen (Motrin / Advil), unless prescribed medications that have acetaminophen or ibuprofen (or similar medications) in it. You can take over the counter acetaminophen tablets (1 - 2 tablets of the 500-mg strength every 6 hours) or ibuprofen tablets (2 tablets every 4 hours). PLEASE RETURN TO THE EMERGENCY DEPARTMENT IMMEDIATELY for worsening symptoms, inability to urinate, worsening of blood in your urine, or if you develop any concerning symptoms such as: high fever not relieved by acetaminophen (Tylenol) and/or ibuprofen (Motrin / Advil), chills, shortness of breath, chest pain, feeling of your heart fluttering or racing, persistent nausea and/or vomiting, vomiting up blood, blood in your stool, loss of consciousness, numbness, weakness or tingling in the arms or legs or change in color of the extremities, changes in mental status, persistent headache, blurry vision, loss of bladder / bowel.

## 2023-08-13 NOTE — ED TRIAGE NOTES
States felt dizzy yesterday and today was wearing a fit bit and it was telling him his pulse was in 110-160's and today with intermittent feelings of palpitations.

## 2023-08-13 NOTE — ED PROVIDER NOTES
elevation or depression, T wave flattening in 3, good R wave progression, Q wave in aVR and 3, change from first EKG    EKG 10:53 PM sinus tachycardia, rate 101 bpm, normal axis, normal intervals, occasional PAC, no ST elevation or depression, T wave inversion in 3, good R wave progression, Q wave in aVR and 3, improved rate and conversion to sinus rhythm    RADIOLOGY:   XR CHEST PORTABLE    Result Date: 8/12/2023  EXAMINATION: ONE XRAY VIEW OF THE CHEST 8/12/2023 11:20 pm COMPARISON: Chest CT 08/16/2016. HISTORY: Heart palpitations FINDINGS: 2 images are presented. The cardiomediastinal and hilar silhouettes appear unremarkable. The lungs appear clear. No pleural effusion evident. No pneumothorax is seen. No acute osseous abnormality is identified. No radiographic evidence of acute cardiopulmonary disease. LABS:  Results for orders placed or performed during the hospital encounter of 08/12/23   COVID-19, Rapid    Specimen: Nasopharyngeal Swab   Result Value Ref Range    Specimen Description . NASOPHARYNGEAL SWAB     SARS-CoV-2, Rapid Not Detected Not Detected   CBC with Auto Differential   Result Value Ref Range    WBC 14.4 (H) 3.5 - 11.3 k/uL    RBC 5.45 4.21 - 5.77 m/uL    Hemoglobin 12.5 (L) 13.0 - 17.0 g/dL    Hematocrit 39.8 (L) 40.7 - 50.3 %    MCV 73.0 (L) 82.6 - 102.9 fL    MCH 22.9 (L) 25.2 - 33.5 pg    MCHC 31.4 25.2 - 33.5 g/dL    RDW 15.0 (H) 11.8 - 14.4 %    Platelets 188 526 - 172 k/uL    MPV 9.0 8.1 - 13.5 fL    NRBC Automated 0.0 0.0 per 100 WBC    Neutrophils % 83 (H) 36 - 65 %    Lymphocytes % 10 (L) 24 - 43 %    Monocytes % 7 3 - 12 %    Eosinophils % 0 (L) 1 - 4 %    Basophils % 0 0 - 2 %    Immature Granulocytes 0 0 %    Neutrophils Absolute 11.90 (H) 1.50 - 8.10 k/uL    Lymphocytes Absolute 1.45 1.10 - 3.70 k/uL    Monocytes Absolute 0.94 0.10 - 1.20 k/uL    Eosinophils Absolute 0.04 0.00 - 0.44 k/uL    Basophils Absolute 0.04 0.00 - 0.20 k/uL    Absolute Immature Granulocyte 0.06

## 2023-08-14 LAB
EKG ATRIAL RATE: 101 BPM
EKG ATRIAL RATE: 116 BPM
EKG P AXIS: 38 DEGREES
EKG P AXIS: 40 DEGREES
EKG P-R INTERVAL: 154 MS
EKG P-R INTERVAL: 200 MS
EKG Q-T INTERVAL: 300 MS
EKG Q-T INTERVAL: 324 MS
EKG Q-T INTERVAL: 344 MS
EKG QRS DURATION: 74 MS
EKG QRS DURATION: 76 MS
EKG QRS DURATION: 76 MS
EKG QTC CALCULATION (BAZETT): 446 MS
EKG QTC CALCULATION (BAZETT): 450 MS
EKG QTC CALCULATION (BAZETT): 501 MS
EKG R AXIS: 17 DEGREES
EKG R AXIS: 24 DEGREES
EKG R AXIS: 26 DEGREES
EKG T AXIS: -30 DEGREES
EKG T AXIS: 12 DEGREES
EKG T AXIS: 8 DEGREES
EKG VENTRICULAR RATE: 101 BPM
EKG VENTRICULAR RATE: 116 BPM
EKG VENTRICULAR RATE: 168 BPM
MICROORGANISM SPEC CULT: ABNORMAL
SPECIMEN DESCRIPTION: ABNORMAL

## 2023-08-15 ENCOUNTER — PATIENT MESSAGE (OUTPATIENT)
Dept: FAMILY MEDICINE CLINIC | Age: 58
End: 2023-08-15

## 2023-08-15 ENCOUNTER — TELEPHONE (OUTPATIENT)
Dept: FAMILY MEDICINE CLINIC | Age: 58
End: 2023-08-15

## 2023-08-15 NOTE — TELEPHONE ENCOUNTER
----- Message from Kari Aranda sent at 8/15/2023 10:36 AM EDT -----  Subject: Appointment Request    Reason for Call: Established Patient Appointment needed: Routine ED Follow   Up Visit    QUESTIONS    Reason for appointment request? No appointments available during search     Additional Information for Provider? Patient is calling because he needs a   ED f/u 8/12, [address]. No available appointments with Dr. Socorro De Leon. please contact patient to schedule appointment.  Please advise.   ---------------------------------------------------------------------------  --------------  Bk Garcia FDQZ  [phone]; OK to leave message on voicemail  ---------------------------------------------------------------------------  --------------  SCRIPT ANSWERS

## 2023-08-16 NOTE — TELEPHONE ENCOUNTER
From: Tita Ny  To: Dr. Quinonez Olp: 8/15/2023 6:28 PM EDT  Subject: Recent ER visit     I'm magaly confused. I called to set an appointment after seeing the er on Saturday. I was diagnosed with tachycardia. I called the heart doctor on Monday as requested. I'm told that I couldn't get in to see him until 9/5/23. I called your office for a follow-up this morning. I've not heard tanika from anyone. Am I supposed to understand/believe that this isn't a dire situation? Fyi, it has taken me until today to get back to 80%.    I guess I just need to hear that this isn't a big deal

## 2023-08-16 NOTE — TELEPHONE ENCOUNTER
Spoke with cardio, scheduled OV for 8/22 at 1PM. Pt returning call, OK with cardio appt and also scheduled with KB for 8/23 at 1120.

## 2023-08-22 ENCOUNTER — OFFICE VISIT (OUTPATIENT)
Dept: CARDIOLOGY | Age: 58
End: 2023-08-22
Payer: COMMERCIAL

## 2023-08-22 VITALS
SYSTOLIC BLOOD PRESSURE: 118 MMHG | HEART RATE: 82 BPM | BODY MASS INDEX: 34.85 KG/M2 | OXYGEN SATURATION: 98 % | HEIGHT: 73 IN | WEIGHT: 263 LBS | DIASTOLIC BLOOD PRESSURE: 64 MMHG

## 2023-08-22 DIAGNOSIS — R00.2 PALPITATIONS: ICD-10-CM

## 2023-08-22 DIAGNOSIS — I50.23 CHF NYHA CLASS II, ACUTE ON CHRONIC, SYSTOLIC (HCC): ICD-10-CM

## 2023-08-22 DIAGNOSIS — E78.00 PURE HYPERCHOLESTEROLEMIA: ICD-10-CM

## 2023-08-22 DIAGNOSIS — R06.02 SOB (SHORTNESS OF BREATH): Primary | ICD-10-CM

## 2023-08-22 DIAGNOSIS — E11.9 CONTROLLED TYPE 2 DIABETES MELLITUS WITHOUT COMPLICATION, UNSPECIFIED WHETHER LONG TERM INSULIN USE (HCC): ICD-10-CM

## 2023-08-22 DIAGNOSIS — R94.31 ABNORMAL ECG: ICD-10-CM

## 2023-08-22 DIAGNOSIS — I10 HYPERTENSION, ESSENTIAL: ICD-10-CM

## 2023-08-22 PROCEDURE — 3074F SYST BP LT 130 MM HG: CPT | Performed by: INTERNAL MEDICINE

## 2023-08-22 PROCEDURE — G8417 CALC BMI ABV UP PARAM F/U: HCPCS | Performed by: INTERNAL MEDICINE

## 2023-08-22 PROCEDURE — G8428 CUR MEDS NOT DOCUMENT: HCPCS | Performed by: INTERNAL MEDICINE

## 2023-08-22 PROCEDURE — 99205 OFFICE O/P NEW HI 60 MIN: CPT | Performed by: INTERNAL MEDICINE

## 2023-08-22 PROCEDURE — 2022F DILAT RTA XM EVC RTNOPTHY: CPT | Performed by: INTERNAL MEDICINE

## 2023-08-22 PROCEDURE — 3078F DIAST BP <80 MM HG: CPT | Performed by: INTERNAL MEDICINE

## 2023-08-22 RX ORDER — ROSUVASTATIN CALCIUM 20 MG/1
20 TABLET, COATED ORAL NIGHTLY
Qty: 90 TABLET | Refills: 3 | Status: SHIPPED | OUTPATIENT
Start: 2023-08-22

## 2023-08-22 RX ORDER — SULFAMETHOXAZOLE AND TRIMETHOPRIM 800; 160 MG/1; MG/1
1 TABLET ORAL 2 TIMES DAILY
COMMUNITY
Start: 2023-08-15

## 2023-08-22 RX ORDER — EZETIMIBE 10 MG/1
10 TABLET ORAL DAILY
Qty: 90 TABLET | Refills: 3 | Status: SHIPPED | OUTPATIENT
Start: 2023-08-22

## 2023-08-22 NOTE — PROGRESS NOTES
Cardiology Consultation  [facility]      23      CC & HPI:    Post ER for SVT. Julia Kirby  is doing well from a cardiac standpoint. Good functional capacity with no significant change in functional capacity. No chest pain, no dyspnea, no PND, no syncope or pre-syncope, no orthopnea. No symptoms of CHF or angina/chest pain. Past Medical History:   Diagnosis Date    Arthritis     Carpal tunnel syndrome     Bilateral.    Cerebral palsy ([facility])     Chest pain     Atypical, history of. Chronic arthralgias of knees and hips     Degenerative disc disease     Lumbar. Diabetes mellitus, type 2 ([facility])     Hip joint valgus deformity     Right hip secondary to muscle imbalance and cerebral palsy. History of blood transfusion     History of kidney stones     Hypertension     Pyelonephritis 10/14/1988    Reflux     Sleep apnea     Spasticity     Severe, lower extremities, with multiple procedures to the hips and knees. Ureteral calculi 1991    Right with obstruction. Past Surgical History:   Procedure Laterality Date    CARPAL TUNNEL RELEASE Right 2009    CHOLECYSTECTOMY  2016    laparscopic w/op grams    COLONOSCOPY  10/1/15    normal    HIP OSTEOTOMY Left     HIP OSTEOTOMY Right     Varus derotation. LITHOTRIPSY Right 1991    With cystoscopy and ureteral catheter placement. LUMBAR FUSION  2005    L4-L5. OTHER SURGICAL HISTORY      pelvic surgery    TOTAL HIP ARTHROPLASTY Left        Family History   Problem Relation Age of Onset    Heart Disease Mother     Heart Disease Father     Heart Attack Brother         age 46    Heart Disease Paternal Uncle        Social History     Socioeconomic History    Marital status:    Tobacco Use    Smoking status: Former     Types: Cigarettes, Cigars     Quit date: 10/9/1999     Years since quittin.8    Smokeless tobacco: Never    Tobacco comments:     quit smoking 10/9/1999.  Grace Morris

## 2023-08-23 ENCOUNTER — OFFICE VISIT (OUTPATIENT)
Dept: FAMILY MEDICINE CLINIC | Age: 58
End: 2023-08-23
Payer: COMMERCIAL

## 2023-08-23 VITALS
HEIGHT: 73 IN | OXYGEN SATURATION: 98 % | WEIGHT: 252.6 LBS | HEART RATE: 81 BPM | RESPIRATION RATE: 14 BRPM | DIASTOLIC BLOOD PRESSURE: 62 MMHG | TEMPERATURE: 97.4 F | SYSTOLIC BLOOD PRESSURE: 108 MMHG | BODY MASS INDEX: 33.48 KG/M2

## 2023-08-23 DIAGNOSIS — R00.0 TACHYCARDIA: Primary | ICD-10-CM

## 2023-08-23 DIAGNOSIS — E78.2 MIXED HYPERLIPIDEMIA: ICD-10-CM

## 2023-08-23 DIAGNOSIS — I10 ESSENTIAL HYPERTENSION: ICD-10-CM

## 2023-08-23 DIAGNOSIS — M62.838 MUSCLE SPASTICITY: ICD-10-CM

## 2023-08-23 DIAGNOSIS — E11.9 TYPE 2 DIABETES MELLITUS WITHOUT COMPLICATION, WITHOUT LONG-TERM CURRENT USE OF INSULIN (HCC): ICD-10-CM

## 2023-08-23 PROCEDURE — 3017F COLORECTAL CA SCREEN DOC REV: CPT | Performed by: FAMILY MEDICINE

## 2023-08-23 PROCEDURE — 99214 OFFICE O/P EST MOD 30 MIN: CPT | Performed by: FAMILY MEDICINE

## 2023-08-23 PROCEDURE — 1036F TOBACCO NON-USER: CPT | Performed by: FAMILY MEDICINE

## 2023-08-23 PROCEDURE — 3074F SYST BP LT 130 MM HG: CPT | Performed by: FAMILY MEDICINE

## 2023-08-23 PROCEDURE — 2022F DILAT RTA XM EVC RTNOPTHY: CPT | Performed by: FAMILY MEDICINE

## 2023-08-23 PROCEDURE — 3078F DIAST BP <80 MM HG: CPT | Performed by: FAMILY MEDICINE

## 2023-08-23 PROCEDURE — G8427 DOCREV CUR MEDS BY ELIG CLIN: HCPCS | Performed by: FAMILY MEDICINE

## 2023-08-23 PROCEDURE — G8417 CALC BMI ABV UP PARAM F/U: HCPCS | Performed by: FAMILY MEDICINE

## 2023-08-23 PROCEDURE — 3044F HG A1C LEVEL LT 7.0%: CPT | Performed by: FAMILY MEDICINE

## 2023-08-23 RX ORDER — CYCLOBENZAPRINE HCL 10 MG
10 TABLET ORAL 2 TIMES DAILY PRN
Qty: 180 TABLET | Refills: 3 | Status: SHIPPED | OUTPATIENT
Start: 2023-08-23

## 2023-08-23 NOTE — PROGRESS NOTES
[address] [facility]  [address] [place] [address]  [phone]      Nevin Billy is a 62 y.o. male who presents today for his medical conditions/complaints as noted below. Nevin Billy is c/o of Follow-up (SVT)      HPI:     Pt here today for ER follow-up. Went to ER on 8/12 for tachycardia - was diagnosed with PSVT. Early in the day - felt fatigued and \"drunk\"  Then started to feel fluttering; denies irregularity  Checked on his BP cuff - highest   Denies dizziness/lightheadedness    Day before, he had not had much to drink water-wise and had 2 bottles of beer    If he coughed, it would subside briefly but then return  Tried eating some food, but it did not resolve    Came to ER 10-12 hours later  Treated with IV Cardizem    Over the next couple days, he felt a few small episodes of flutter that made him cough  Had last episode 5 days ago    Fatigue for the next 2 days after ER - slept the whole time  Chills started 3 days later x 2 days with mild cough  Covid had been negative in ER  No documented fever    Has lost 12 lbs since his OV in 4/2023 - eating smaller quantities and has been more active this summer        Past Medical History:   Diagnosis Date    Arthritis     Carpal tunnel syndrome     Bilateral.    Cerebral palsy ([address])     Chest pain     Atypical, history of. Chronic arthralgias of knees and hips     Degenerative disc disease     Lumbar. Diabetes mellitus, type 2 (HCC)     Hip joint valgus deformity     Right hip secondary to muscle imbalance and cerebral palsy. History of blood transfusion     History of kidney stones     Hypertension     Pyelonephritis 10/14/1988    Reflux     Sleep apnea     Spasticity     Severe, lower extremities, with multiple procedures to the hips and knees. Ureteral calculi 06/04/1991    Right with obstruction.       Past Surgical History:   Procedure Laterality Date    CARPAL TUNNEL RELEASE Right 09/21/2009

## 2023-08-28 ENCOUNTER — TELEPHONE (OUTPATIENT)
Dept: CARDIOLOGY | Age: 58
End: 2023-08-28

## 2023-08-28 ENCOUNTER — HOSPITAL ENCOUNTER (OUTPATIENT)
Age: 58
Discharge: HOME | End: 2023-08-30
Attending: INTERNAL MEDICINE
Payer: COMMERCIAL

## 2023-08-28 VITALS
SYSTOLIC BLOOD PRESSURE: 133 MMHG | HEART RATE: 104 BPM | HEIGHT: 73 IN | BODY MASS INDEX: 33.4 KG/M2 | DIASTOLIC BLOOD PRESSURE: 71 MMHG | WEIGHT: 252 LBS

## 2023-08-28 DIAGNOSIS — R94.31 ABNORMAL ECG: ICD-10-CM

## 2023-08-28 DIAGNOSIS — I10 HYPERTENSION, ESSENTIAL: ICD-10-CM

## 2023-08-28 DIAGNOSIS — R06.02 SOB (SHORTNESS OF BREATH): ICD-10-CM

## 2023-08-28 DIAGNOSIS — I50.23 CHF NYHA CLASS II, ACUTE ON CHRONIC, SYSTOLIC (HCC): ICD-10-CM

## 2023-08-28 DIAGNOSIS — R00.2 PALPITATIONS: ICD-10-CM

## 2023-08-28 DIAGNOSIS — E11.9 CONTROLLED TYPE 2 DIABETES MELLITUS WITHOUT COMPLICATION, UNSPECIFIED WHETHER LONG TERM INSULIN USE (HCC): ICD-10-CM

## 2023-08-28 DIAGNOSIS — E78.00 PURE HYPERCHOLESTEROLEMIA: ICD-10-CM

## 2023-08-28 LAB
ECHO AO ROOT DIAM: 3.1 CM
ECHO AO ROOT INDEX: 1.31 CM/M2
ECHO AV PEAK GRADIENT: 6 MMHG
ECHO AV PEAK VELOCITY: 1.3 M/S
ECHO AV VELOCITY RATIO: 0.77
ECHO BSA: 2.43 M2
ECHO LA AREA 4C: 16.1 CM2
ECHO LA DIAMETER INDEX: 1.94 CM/M2
ECHO LA DIAMETER: 4.6 CM
ECHO LA MAJOR AXIS: 5.5 CM
ECHO LA TO AORTIC ROOT RATIO: 1.48
ECHO LA VOL 4C: 37 ML (ref 18–58)
ECHO LA VOLUME INDEX A4C: 16 ML/M2 (ref 16–34)
ECHO LV E' LATERAL VELOCITY: 10 CM/S
ECHO LV E' SEPTAL VELOCITY: 7 CM/S
ECHO LV EJECTION FRACTION BIPLANE: 59 % (ref 55–100)
ECHO LV FRACTIONAL SHORTENING: 26 % (ref 28–44)
ECHO LV INTERNAL DIMENSION DIASTOLE INDEX: 1.98 CM/M2
ECHO LV INTERNAL DIMENSION DIASTOLIC: 4.7 CM (ref 4.2–5.9)
ECHO LV INTERNAL DIMENSION SYSTOLIC INDEX: 1.48 CM/M2
ECHO LV INTERNAL DIMENSION SYSTOLIC: 3.5 CM
ECHO LV IVSD: 1 CM (ref 0.6–1)
ECHO LV MASS 2D: 153.4 G (ref 88–224)
ECHO LV MASS INDEX 2D: 64.7 G/M2 (ref 49–115)
ECHO LV POSTERIOR WALL DIASTOLIC: 0.9 CM (ref 0.6–1)
ECHO LV RELATIVE WALL THICKNESS RATIO: 0.38
ECHO LVOT PEAK GRADIENT: 4 MMHG
ECHO LVOT PEAK VELOCITY: 1 M/S
ECHO MV A VELOCITY: 0.92 M/S
ECHO MV E DECELERATION TIME (DT): 320 MS
ECHO MV E VELOCITY: 0.78 M/S
ECHO MV E/A RATIO: 0.85
ECHO MV E/E' LATERAL: 7.8
ECHO MV E/E' RATIO (AVERAGED): 9.47
ECHO MV E/E' SEPTAL: 11.14
ECHO MV MAX VELOCITY: 1.1 M/S
ECHO MV PEAK GRADIENT: 4 MMHG
ECHO PV MAX VELOCITY: 1.2 M/S
ECHO PV PEAK GRADIENT: 6 MMHG
ECHO TV PEAK GRADIENT: 3 MMHG

## 2023-08-28 PROCEDURE — 93306 TTE W/DOPPLER COMPLETE: CPT

## 2023-08-28 PROCEDURE — 93306 TTE W/DOPPLER COMPLETE: CPT | Performed by: INTERNAL MEDICINE

## 2023-08-28 NOTE — TELEPHONE ENCOUNTER
Please review echo and advise.      Last Appt:  8/22/2023  Next Appt:   Visit date not found  Med verified in [id number] [place] [address] 15

## 2023-08-30 ASSESSMENT — ENCOUNTER SYMPTOMS: SHORTNESS OF BREATH: 0

## 2023-09-07 ENCOUNTER — OFFICE VISIT (OUTPATIENT)
Dept: PODIATRY | Age: 58
End: 2023-09-07
Payer: COMMERCIAL

## 2023-09-07 VITALS — SYSTOLIC BLOOD PRESSURE: 118 MMHG | HEART RATE: 68 BPM | RESPIRATION RATE: 20 BRPM | DIASTOLIC BLOOD PRESSURE: 60 MMHG

## 2023-09-07 DIAGNOSIS — B35.1 DERMATOPHYTOSIS OF NAIL: ICD-10-CM

## 2023-09-07 DIAGNOSIS — E11.51 CONTROLLED TYPE 2 DM WITH PERIPHERAL CIRCULATORY DISORDER (HCC): Primary | ICD-10-CM

## 2023-09-07 PROCEDURE — 11721 DEBRIDE NAIL 6 OR MORE: CPT | Performed by: PODIATRIST

## 2023-09-07 PROCEDURE — 99999 PR OFFICE/OUTPT VISIT,PROCEDURE ONLY: CPT | Performed by: PODIATRIST

## 2023-09-07 NOTE — PROGRESS NOTES
Foot Care Worksheet  PCP: iDck Crockett DO  Last visit:  08 / 23 / 2023    Nail description: Thick , Yellow , Crumbly , Marked limitation of ambulation     Pain resulting from thickened and dystrophy of nail plate No    Nails involved  Right   1, 2, 3, 4, 5  (T5-T9)  Left     1, 2, 3, 4, 5  (TA-T4)    Q7 1 Class A Finding - Non traumatic amputation of foot No    Q8 2 Class B Findings - Absent DP pulse No, Absent PT pulse No, Advanced tropic changes (3 required) Yes    Decrease hair growth No, Nail changes/thickening Yes, Pigmented changes/discoloration Yes, Skin texture (thin, shiny) Yes, Skin color (rubor/redness) No    Q9 1 Class B and 2 Class C Findings  Claudication No, Temperature change Yes, Paresthesia Yes, Burning No, Edema Yes    Subjective:  Manjeet Arguelles is a 62 y.o. male who presents to the office today for routine DM foot care. Currently denies F/C/N/V. Allergies   Allergen Reactions    Fenofibrate Rash       Past Medical History:   Diagnosis Date    Arthritis     Carpal tunnel syndrome     Bilateral.    Cerebral palsy (HCC)     Chest pain     Atypical, history of. Chronic arthralgias of knees and hips     Degenerative disc disease     Lumbar. Diabetes mellitus, type 2 (HCC)     Hip joint valgus deformity     Right hip secondary to muscle imbalance and cerebral palsy. History of blood transfusion     History of kidney stones     Hypertension     Pyelonephritis 10/14/1988    Reflux     Sleep apnea     Spasticity     Severe, lower extremities, with multiple procedures to the hips and knees. Ureteral calculi 06/04/1991    Right with obstruction. Prior to Admission medications    Medication Sig Start Date End Date Taking?  Authorizing Provider   cyclobenzaprine (FLEXERIL) 10 MG tablet Take 1 tablet by mouth 2 times daily as needed for Muscle spasms 8/23/23  Yes Wilnre Thakkar DO   sulfamethoxazole-trimethoprim (BACTRIM DS;SEPTRA DS) 800-160 MG per tablet Take 1 tablet by mouth 2

## 2023-09-19 ENCOUNTER — HOSPITAL ENCOUNTER (OUTPATIENT)
Age: 58
Discharge: HOME | End: 2023-09-21
Attending: INTERNAL MEDICINE
Payer: COMMERCIAL

## 2023-09-19 DIAGNOSIS — I10 HYPERTENSION, ESSENTIAL: ICD-10-CM

## 2023-09-19 DIAGNOSIS — I50.23 CHF NYHA CLASS II, ACUTE ON CHRONIC, SYSTOLIC (HCC): ICD-10-CM

## 2023-09-19 DIAGNOSIS — R06.02 SOB (SHORTNESS OF BREATH): ICD-10-CM

## 2023-09-19 DIAGNOSIS — R94.31 ABNORMAL ECG: ICD-10-CM

## 2023-09-19 DIAGNOSIS — E11.9 CONTROLLED TYPE 2 DIABETES MELLITUS WITHOUT COMPLICATION, UNSPECIFIED WHETHER LONG TERM INSULIN USE (HCC): ICD-10-CM

## 2023-09-19 DIAGNOSIS — R00.2 PALPITATIONS: ICD-10-CM

## 2023-09-19 DIAGNOSIS — E78.00 PURE HYPERCHOLESTEROLEMIA: ICD-10-CM

## 2023-09-19 PROCEDURE — 93227 XTRNL ECG REC<48 HR R&I: CPT | Performed by: INTERNAL MEDICINE

## 2023-09-19 PROCEDURE — 93225 XTRNL ECG REC<48 HRS REC: CPT

## 2023-09-25 ENCOUNTER — TELEPHONE (OUTPATIENT)
Dept: CARDIOLOGY | Age: 58
End: 2023-09-25

## 2023-09-25 NOTE — TELEPHONE ENCOUNTER
Holter Monitor results     Sinus, average heart rate of 87, sinus Roberto Carlos 58, sinus tachy 118. Rare PVCs. Rare PACs.

## 2023-10-06 ENCOUNTER — OFFICE VISIT (OUTPATIENT)
Dept: PRIMARY CARE CLINIC | Age: 58
End: 2023-10-06
Payer: COMMERCIAL

## 2023-10-06 VITALS
DIASTOLIC BLOOD PRESSURE: 64 MMHG | HEART RATE: 87 BPM | TEMPERATURE: 97.1 F | RESPIRATION RATE: 18 BRPM | BODY MASS INDEX: 33.25 KG/M2 | OXYGEN SATURATION: 98 % | SYSTOLIC BLOOD PRESSURE: 100 MMHG | HEIGHT: 73 IN

## 2023-10-06 DIAGNOSIS — S29.012A STRAIN OF THORACIC BACK REGION: Primary | ICD-10-CM

## 2023-10-06 PROCEDURE — G8417 CALC BMI ABV UP PARAM F/U: HCPCS | Performed by: NURSE PRACTITIONER

## 2023-10-06 PROCEDURE — 1036F TOBACCO NON-USER: CPT | Performed by: NURSE PRACTITIONER

## 2023-10-06 PROCEDURE — 3017F COLORECTAL CA SCREEN DOC REV: CPT | Performed by: NURSE PRACTITIONER

## 2023-10-06 PROCEDURE — G8427 DOCREV CUR MEDS BY ELIG CLIN: HCPCS | Performed by: NURSE PRACTITIONER

## 2023-10-06 PROCEDURE — 99213 OFFICE O/P EST LOW 20 MIN: CPT | Performed by: NURSE PRACTITIONER

## 2023-10-06 PROCEDURE — 3078F DIAST BP <80 MM HG: CPT | Performed by: NURSE PRACTITIONER

## 2023-10-06 PROCEDURE — 3074F SYST BP LT 130 MM HG: CPT | Performed by: NURSE PRACTITIONER

## 2023-10-06 PROCEDURE — G8484 FLU IMMUNIZE NO ADMIN: HCPCS | Performed by: NURSE PRACTITIONER

## 2023-10-06 RX ORDER — PREDNISONE 20 MG/1
20 TABLET ORAL 2 TIMES DAILY
Qty: 10 TABLET | Refills: 0 | Status: SHIPPED | OUTPATIENT
Start: 2023-10-06 | End: 2023-10-11

## 2023-10-06 ASSESSMENT — ENCOUNTER SYMPTOMS: BACK PAIN: 1

## 2023-10-06 NOTE — PROGRESS NOTES
Subjective:      Patient ID: Melvin Licona is a 62 y.o. male coming in for   Chief Complaint   Patient presents with    Back Pain     3 weeks ago, dragging wood, mid back - radiates to right side, back spasms, taking flexeril and that helps some        Back Pain  This is a new problem. Episode onset: right parathoracic back pain ongoing for approx 3 weeks. started while working on a deck and dragging lumbar. The problem occurs constantly. The pain is present in the thoracic spine. The quality of the pain is described as stabbing. The pain does not radiate. The pain is moderate. The symptoms are aggravated by twisting and position. Pertinent negatives include no numbness or paresthesias. He has tried muscle relaxant for the symptoms. The treatment provided mild relief. Review of Systems   Musculoskeletal:  Positive for back pain. Neurological:  Negative for numbness and paresthesias. Objective:   Physical Exam  Vitals and nursing note reviewed. Constitutional:       General: He is not in acute distress. Appearance: Normal appearance. He is not ill-appearing. HENT:      Head: Normocephalic. Pulmonary:      Effort: Pulmonary effort is normal.   Musculoskeletal:      Cervical back: Neck supple. Thoracic back: Spasms and tenderness present. No swelling or bony tenderness. Back:    Skin:     General: Skin is warm and dry. Findings: No rash. Neurological:      General: No focal deficit present. Mental Status: He is alert and oriented to person, place, and time. Assessment:      1. Strain of thoracic back region           Plan:    -discussed changing muscle relaxant, but pt wanting to stick with flexeril for now  -prednisone to help with inflammation  -heat and stretching advised. No orders of the defined types were placed in this encounter.      Outpatient Encounter Medications as of 10/6/2023   Medication Sig Dispense Refill    predniSONE (DELTASONE) 20 MG

## 2023-11-06 ENCOUNTER — OFFICE VISIT (OUTPATIENT)
Dept: FAMILY MEDICINE CLINIC | Age: 58
End: 2023-11-06
Payer: COMMERCIAL

## 2023-11-06 ENCOUNTER — HOSPITAL ENCOUNTER (OUTPATIENT)
Age: 58
Discharge: HOME | End: 2023-11-06
Payer: COMMERCIAL

## 2023-11-06 VITALS
OXYGEN SATURATION: 97 % | RESPIRATION RATE: 16 BRPM | WEIGHT: 253.2 LBS | HEIGHT: 73 IN | SYSTOLIC BLOOD PRESSURE: 108 MMHG | TEMPERATURE: 97.7 F | DIASTOLIC BLOOD PRESSURE: 68 MMHG | HEART RATE: 91 BPM | BODY MASS INDEX: 33.56 KG/M2

## 2023-11-06 DIAGNOSIS — Z12.5 SCREENING FOR PROSTATE CANCER: ICD-10-CM

## 2023-11-06 DIAGNOSIS — I10 ESSENTIAL HYPERTENSION: ICD-10-CM

## 2023-11-06 DIAGNOSIS — G80.8 OTHER CEREBRAL PALSY (HCC): ICD-10-CM

## 2023-11-06 DIAGNOSIS — R00.0 TACHYCARDIA: ICD-10-CM

## 2023-11-06 DIAGNOSIS — E78.2 MIXED HYPERLIPIDEMIA: ICD-10-CM

## 2023-11-06 DIAGNOSIS — E11.9 TYPE 2 DIABETES MELLITUS WITHOUT COMPLICATION, WITHOUT LONG-TERM CURRENT USE OF INSULIN (HCC): Primary | ICD-10-CM

## 2023-11-06 DIAGNOSIS — M77.8 LEFT SHOULDER TENDONITIS: ICD-10-CM

## 2023-11-06 DIAGNOSIS — E11.9 TYPE 2 DIABETES MELLITUS WITHOUT COMPLICATION, WITHOUT LONG-TERM CURRENT USE OF INSULIN (HCC): ICD-10-CM

## 2023-11-06 LAB
ALBUMIN SERPL-MCNC: 4.3 G/DL (ref 3.5–5.2)
ALBUMIN/GLOB SERPL: 1.5 {RATIO} (ref 1–2.5)
ALP SERPL-CCNC: 77 U/L (ref 40–129)
ALT SERPL-CCNC: 20 U/L (ref 5–41)
ANION GAP SERPL CALCULATED.3IONS-SCNC: 13 MMOL/L (ref 9–17)
AST SERPL-CCNC: 23 U/L
BASOPHILS # BLD: 0.05 K/UL (ref 0–0.2)
BASOPHILS NFR BLD: 1 % (ref 0–2)
BILIRUB SERPL-MCNC: 0.5 MG/DL (ref 0.3–1.2)
BUN SERPL-MCNC: 11 MG/DL (ref 6–20)
BUN/CREAT SERPL: 12 (ref 9–20)
CALCIUM SERPL-MCNC: 9.8 MG/DL (ref 8.6–10.4)
CHLORIDE SERPL-SCNC: 100 MMOL/L (ref 98–107)
CO2 SERPL-SCNC: 27 MMOL/L (ref 20–31)
CREAT SERPL-MCNC: 0.9 MG/DL (ref 0.7–1.2)
CREAT UR-MCNC: 148.5 MG/DL (ref 39–259)
EOSINOPHIL # BLD: 0.15 K/UL (ref 0–0.44)
EOSINOPHILS RELATIVE PERCENT: 2 % (ref 1–4)
ERYTHROCYTE [DISTWIDTH] IN BLOOD BY AUTOMATED COUNT: 15.1 % (ref 11.8–14.4)
EST. AVERAGE GLUCOSE BLD GHB EST-MCNC: 126 MG/DL
GFR SERPL CREATININE-BSD FRML MDRD: >60 ML/MIN/1.73M2
GLUCOSE SERPL-MCNC: 117 MG/DL (ref 70–99)
HBA1C MFR BLD: 6 % (ref 4–6)
HCT VFR BLD AUTO: 40.4 % (ref 40.7–50.3)
HGB BLD-MCNC: 12.6 G/DL (ref 13–17)
IMM GRANULOCYTES # BLD AUTO: 0.03 K/UL (ref 0–0.3)
IMM GRANULOCYTES NFR BLD: 0 %
LYMPHOCYTES NFR BLD: 1.54 K/UL (ref 1.1–3.7)
LYMPHOCYTES RELATIVE PERCENT: 22 % (ref 24–43)
MCH RBC QN AUTO: 23.2 PG (ref 25.2–33.5)
MCHC RBC AUTO-ENTMCNC: 31.2 G/DL (ref 25.2–33.5)
MCV RBC AUTO: 74.4 FL (ref 82.6–102.9)
MICROALBUMIN UR-MCNC: <12 MG/L
MICROALBUMIN/CREAT UR-RTO: NORMAL MCG/MG CREAT
MONOCYTES NFR BLD: 0.42 K/UL (ref 0.1–1.2)
MONOCYTES NFR BLD: 6 % (ref 3–12)
NEUTROPHILS NFR BLD: 69 % (ref 36–65)
NEUTS SEG NFR BLD: 4.79 K/UL (ref 1.5–8.1)
NRBC BLD-RTO: 0 PER 100 WBC
PLATELET # BLD AUTO: 252 K/UL (ref 138–453)
PMV BLD AUTO: 8.7 FL (ref 8.1–13.5)
POTASSIUM SERPL-SCNC: 4.2 MMOL/L (ref 3.7–5.3)
PROT SERPL-MCNC: 7.1 G/DL (ref 6.4–8.3)
RBC # BLD AUTO: 5.43 M/UL (ref 4.21–5.77)
RBC # BLD: ABNORMAL 10*6/UL
SODIUM SERPL-SCNC: 140 MMOL/L (ref 135–144)
TSH SERPL DL<=0.05 MIU/L-ACNC: 2.19 UIU/ML (ref 0.3–5)
WBC OTHER # BLD: 7 K/UL (ref 3.5–11.3)

## 2023-11-06 PROCEDURE — 82570 ASSAY OF URINE CREATININE: CPT

## 2023-11-06 PROCEDURE — 3017F COLORECTAL CA SCREEN DOC REV: CPT | Performed by: FAMILY MEDICINE

## 2023-11-06 PROCEDURE — 99214 OFFICE O/P EST MOD 30 MIN: CPT | Performed by: FAMILY MEDICINE

## 2023-11-06 PROCEDURE — 3078F DIAST BP <80 MM HG: CPT | Performed by: FAMILY MEDICINE

## 2023-11-06 PROCEDURE — 85025 COMPLETE CBC W/AUTO DIFF WBC: CPT

## 2023-11-06 PROCEDURE — 84443 ASSAY THYROID STIM HORMONE: CPT

## 2023-11-06 PROCEDURE — G8427 DOCREV CUR MEDS BY ELIG CLIN: HCPCS | Performed by: FAMILY MEDICINE

## 2023-11-06 PROCEDURE — 3044F HG A1C LEVEL LT 7.0%: CPT | Performed by: FAMILY MEDICINE

## 2023-11-06 PROCEDURE — 3074F SYST BP LT 130 MM HG: CPT | Performed by: FAMILY MEDICINE

## 2023-11-06 PROCEDURE — 36415 COLL VENOUS BLD VENIPUNCTURE: CPT

## 2023-11-06 PROCEDURE — G0103 PSA SCREENING: HCPCS

## 2023-11-06 PROCEDURE — 1036F TOBACCO NON-USER: CPT | Performed by: FAMILY MEDICINE

## 2023-11-06 PROCEDURE — 80053 COMPREHEN METABOLIC PANEL: CPT

## 2023-11-06 PROCEDURE — 80061 LIPID PANEL: CPT

## 2023-11-06 PROCEDURE — 82043 UR ALBUMIN QUANTITATIVE: CPT

## 2023-11-06 PROCEDURE — 83036 HEMOGLOBIN GLYCOSYLATED A1C: CPT

## 2023-11-06 PROCEDURE — G8484 FLU IMMUNIZE NO ADMIN: HCPCS | Performed by: FAMILY MEDICINE

## 2023-11-06 PROCEDURE — 2022F DILAT RTA XM EVC RTNOPTHY: CPT | Performed by: FAMILY MEDICINE

## 2023-11-06 PROCEDURE — G8417 CALC BMI ABV UP PARAM F/U: HCPCS | Performed by: FAMILY MEDICINE

## 2023-11-06 ASSESSMENT — ENCOUNTER SYMPTOMS
BLOOD IN STOOL: 0
SHORTNESS OF BREATH: 0

## 2023-11-07 LAB
CHOLEST SERPL-MCNC: 67 MG/DL
CHOLESTEROL/HDL RATIO: 3.4
HDLC SERPL-MCNC: 20 MG/DL
LDLC SERPL CALC-MCNC: 13 MG/DL (ref 0–130)
PSA SERPL-MCNC: 0.5 NG/ML
TRIGL SERPL-MCNC: 170 MG/DL

## 2023-11-10 ENCOUNTER — PATIENT MESSAGE (OUTPATIENT)
Dept: FAMILY MEDICINE CLINIC | Age: 58
End: 2023-11-10

## 2023-11-10 NOTE — TELEPHONE ENCOUNTER
From: Halina Ny  To: Dr. Natalia Yeh: 11/10/2023 1:20 PM EST  Subject: Chair repair     I've contacted lula about why I've not heard anything about parts for my power chair. They informed me the spoke to Montez Knight and another time with [facility]. They still have not received the signed script from you. The batteries in my chair aren't long for this world. Please remedy this situation.  Thank you

## 2023-11-14 ENCOUNTER — TELEPHONE (OUTPATIENT)
Dept: FAMILY MEDICINE CLINIC | Age: 58
End: 2023-11-14

## 2023-11-14 NOTE — TELEPHONE ENCOUNTER
----- Message from [employer] [place] sent at 11/14/2023  3:14 PM EST -----  Subject: Message to Provider    QUESTIONS  Information for Provider? Alona Garcia from [employer]  is calling in b/c he says he   faxed paperwork over several times regarding this pts wheelchair. He says   he spoke with Feli Cuevas on 11/2/23 and was told the fax was received just   waiting to be signed then called back on 11/8/23 and was told by [facility] to   fax it again which he did now he is just trying to follow up on the status   but unable to reach the office. He asks that someone please give him a   call back right away @ [phone] He states that this is a secure line   and vm can be left  ---------------------------------------------------------------------------  --------------  CALL BACK INFO  [phone]; OK to leave message on voicemail  ---------------------------------------------------------------------------  --------------  SCRIPT ANSWERS  Relationship to Patient? Covered Entity  Covered Entity Type? Durable Medical Equipment? Representative Name?  Alona Garcia

## 2023-11-29 ENCOUNTER — OFFICE VISIT (OUTPATIENT)
Dept: PODIATRY | Age: 58
End: 2023-11-29
Payer: COMMERCIAL

## 2023-11-29 VITALS — SYSTOLIC BLOOD PRESSURE: 118 MMHG | HEART RATE: 78 BPM | DIASTOLIC BLOOD PRESSURE: 74 MMHG

## 2023-11-29 DIAGNOSIS — B35.1 DERMATOPHYTOSIS OF NAIL: ICD-10-CM

## 2023-11-29 DIAGNOSIS — E11.51 CONTROLLED TYPE 2 DM WITH PERIPHERAL CIRCULATORY DISORDER (HCC): Primary | ICD-10-CM

## 2023-11-29 PROCEDURE — 99999 PR OFFICE/OUTPT VISIT,PROCEDURE ONLY: CPT | Performed by: PODIATRIST

## 2023-11-29 PROCEDURE — 11721 DEBRIDE NAIL 6 OR MORE: CPT | Performed by: PODIATRIST

## 2023-11-29 NOTE — PROGRESS NOTES
Foot Care Worksheet  PCP: Francisco J Reyes DO  Last visit:  08 / 23 / 2023    Nail description: Thick , Yellow , Crumbly , Marked limitation of ambulation     Pain resulting from thickened and dystrophy of nail plate No    Nails involved  Right   1, 2, 3, 4, 5  (T5-T9)  Left     1, 2, 3, 4, 5  (TA-T4)    Q7 1 Class A Finding - Non traumatic amputation of foot No    Q8 2 Class B Findings - Absent DP pulse No, Absent PT pulse No, Advanced tropic changes (3 required) Yes    Decrease hair growth No, Nail changes/thickening Yes, Pigmented changes/discoloration Yes, Skin texture (thin, shiny) Yes, Skin color (rubor/redness) No    Q9 1 Class B and 2 Class C Findings  Claudication No, Temperature change Yes, Paresthesia Yes, Burning No, Edema Yes    Subjective:  Maikol Vincent is a 62 y.o. male who presents to the office today for routine DM foot care. Currently denies F/C/N/V. Allergies   Allergen Reactions    Fenofibrate Rash       Past Medical History:   Diagnosis Date    Arthritis     Carpal tunnel syndrome     Bilateral.    Cerebral palsy (HCC)     Chest pain     Atypical, history of. Chronic arthralgias of knees and hips     Degenerative disc disease     Lumbar. Diabetes mellitus, type 2 (HCC)     Hip joint valgus deformity     Right hip secondary to muscle imbalance and cerebral palsy. History of blood transfusion     History of kidney stones     Hypertension     Pyelonephritis 10/14/1988    Reflux     Sleep apnea     Spasticity     Severe, lower extremities, with multiple procedures to the hips and knees. Ureteral calculi 06/04/1991    Right with obstruction. Prior to Admission medications    Medication Sig Start Date End Date Taking?  Authorizing Provider   cyclobenzaprine (FLEXERIL) 10 MG tablet Take 1 tablet by mouth 2 times daily as needed for Muscle spasms 8/23/23  Yes Taylor Thakkar DO   Cranberry 1000 MG CAPS Take by mouth   Yes ProviderNegrita MD   NONFORMULARY Uses medical

## 2023-12-12 ENCOUNTER — OFFICE VISIT (OUTPATIENT)
Dept: PRIMARY CARE CLINIC | Age: 58
End: 2023-12-12
Payer: COMMERCIAL

## 2023-12-12 VITALS
TEMPERATURE: 98.2 F | SYSTOLIC BLOOD PRESSURE: 116 MMHG | DIASTOLIC BLOOD PRESSURE: 60 MMHG | HEART RATE: 80 BPM | OXYGEN SATURATION: 98 % | BODY MASS INDEX: 33.38 KG/M2 | WEIGHT: 253 LBS

## 2023-12-12 DIAGNOSIS — H66.92 LEFT OTITIS MEDIA, UNSPECIFIED OTITIS MEDIA TYPE: Primary | ICD-10-CM

## 2023-12-12 PROCEDURE — G8417 CALC BMI ABV UP PARAM F/U: HCPCS

## 2023-12-12 PROCEDURE — 1036F TOBACCO NON-USER: CPT

## 2023-12-12 PROCEDURE — G8484 FLU IMMUNIZE NO ADMIN: HCPCS

## 2023-12-12 PROCEDURE — 3074F SYST BP LT 130 MM HG: CPT

## 2023-12-12 PROCEDURE — 3078F DIAST BP <80 MM HG: CPT

## 2023-12-12 PROCEDURE — G8427 DOCREV CUR MEDS BY ELIG CLIN: HCPCS

## 2023-12-12 PROCEDURE — 99213 OFFICE O/P EST LOW 20 MIN: CPT

## 2023-12-12 PROCEDURE — 3017F COLORECTAL CA SCREEN DOC REV: CPT

## 2023-12-12 RX ORDER — AMOXICILLIN AND CLAVULANATE POTASSIUM 875; 125 MG/1; MG/1
1 TABLET, FILM COATED ORAL 2 TIMES DAILY
Qty: 14 TABLET | Refills: 0 | Status: SHIPPED | OUTPATIENT
Start: 2023-12-12 | End: 2023-12-19

## 2023-12-12 ASSESSMENT — PATIENT HEALTH QUESTIONNAIRE - PHQ9
SUM OF ALL RESPONSES TO PHQ QUESTIONS 1-9: 0
SUM OF ALL RESPONSES TO PHQ QUESTIONS 1-9: 0
SUM OF ALL RESPONSES TO PHQ9 QUESTIONS 1 & 2: 0
2. FEELING DOWN, DEPRESSED OR HOPELESS: 0
SUM OF ALL RESPONSES TO PHQ QUESTIONS 1-9: 0
1. LITTLE INTEREST OR PLEASURE IN DOING THINGS: 0
SUM OF ALL RESPONSES TO PHQ QUESTIONS 1-9: 0

## 2023-12-12 ASSESSMENT — ENCOUNTER SYMPTOMS
ALLERGIC/IMMUNOLOGIC NEGATIVE: 1
VOMITING: 0
COUGH: 0
DIARRHEA: 0
RHINORRHEA: 1
GASTROINTESTINAL NEGATIVE: 1
EYES NEGATIVE: 1
SINUS PRESSURE: 1
RESPIRATORY NEGATIVE: 1
SINUS PAIN: 1
SHORTNESS OF BREATH: 0
SORE THROAT: 0

## 2023-12-12 NOTE — PROGRESS NOTES
[facility]  [address]  [address] [address]  Dept: [phone]  Dept Fax: [phone]    Yan Palmer  is a 62 y.o. male who presents today for his medical conditions/complaints as noted below. Yan Palmer is c/o of   Chief Complaint   Patient presents with    Otalgia     Left ear        HPI:     Otalgia   There is pain in the left ear. This is a new problem. The current episode started in the past 7 days. The problem occurs constantly. The problem has been gradually worsening. There has been no fever. The pain is at a severity of 4/10. The pain is mild. Associated symptoms include headaches and rhinorrhea. Pertinent negatives include no coughing, diarrhea, hearing loss, sore throat or vomiting. He has tried nothing for the symptoms. The treatment provided no relief. Past Medical History:   Diagnosis Date    Arthritis     Carpal tunnel syndrome     Bilateral.    Cerebral palsy (HCC)     Chest pain     Atypical, history of. Chronic arthralgias of knees and hips     Degenerative disc disease     Lumbar. Diabetes mellitus, type 2 (HCC)     Hip joint valgus deformity     Right hip secondary to muscle imbalance and cerebral palsy. History of blood transfusion     History of kidney stones     Hypertension     Pyelonephritis 10/14/1988    Reflux     Sleep apnea     Spasticity     Severe, lower extremities, with multiple procedures to the hips and knees. Ureteral calculi 06/04/1991    Right with obstruction. Past Surgical History:   Procedure Laterality Date    CARPAL TUNNEL RELEASE Right 09/21/2009    CHOLECYSTECTOMY  09/14/2016    laparscopic w/op grams    COLONOSCOPY  10/1/15    normal    HIP OSTEOTOMY Left 1989    HIP OSTEOTOMY Right 1983    Varus derotation. LITHOTRIPSY Right 06/04/1991    With cystoscopy and ureteral catheter placement.     LUMBAR

## 2023-12-12 NOTE — PATIENT INSTRUCTIONS
Start antibiotics as prescribed  Complete whole course of antibiotics  May use tylenol and ibuprofen for pain/ fever  If symptoms worsen follow up with PCP or return to walk in clinic  Patient verbalized understanding and agrees with plan of care

## 2024-01-01 DIAGNOSIS — I10 ESSENTIAL HYPERTENSION: ICD-10-CM

## 2024-01-01 DIAGNOSIS — E11.9 TYPE 2 DIABETES MELLITUS WITHOUT COMPLICATION, WITHOUT LONG-TERM CURRENT USE OF INSULIN (HCC): ICD-10-CM

## 2024-01-01 DIAGNOSIS — Z12.5 SCREENING FOR PROSTATE CANCER: ICD-10-CM

## 2024-01-01 DIAGNOSIS — E78.2 MIXED HYPERLIPIDEMIA: Primary | ICD-10-CM

## 2024-01-04 RX ORDER — DULAGLUTIDE 1.5 MG/.5ML
1.5 INJECTION, SOLUTION SUBCUTANEOUS WEEKLY
Qty: 6 ML | Refills: 1 | Status: SHIPPED | OUTPATIENT
Start: 2024-01-04

## 2024-01-06 DIAGNOSIS — E11.9 TYPE 2 DIABETES MELLITUS WITHOUT COMPLICATION, WITHOUT LONG-TERM CURRENT USE OF INSULIN (HCC): ICD-10-CM

## 2024-01-08 RX ORDER — METFORMIN HYDROCHLORIDE 500 MG/1
1000 TABLET, EXTENDED RELEASE ORAL 2 TIMES DAILY WITH MEALS
Qty: 360 TABLET | Refills: 3 | Status: SHIPPED | OUTPATIENT
Start: 2024-01-08

## 2024-01-08 NOTE — TELEPHONE ENCOUNTER
Petey called requesting a refill of the below medication which has been pended for you:     Requested Prescriptions     Pending Prescriptions Disp Refills    metFORMIN (GLUCOPHAGE-XR) 500 MG extended release tablet [Pharmacy Med Name: METFORMIN ER 500MG 24HR TABS] 360 tablet 3     Sig: TAKE 2 TABLETS BY MOUTH TWICE DAILY       Last Appointment Date: 11/6/2023  Next Appointment Date: 5/6/2024    Allergies   Allergen Reactions    Fenofibrate Rash

## 2024-01-29 ENCOUNTER — PATIENT MESSAGE (OUTPATIENT)
Dept: FAMILY MEDICINE CLINIC | Age: 59
End: 2024-01-29

## 2024-01-30 NOTE — TELEPHONE ENCOUNTER
From: Petey Ny  To: Dr. Gilda Thakkar  Sent: 1/29/2024 7:02 PM EST  Subject: Trulicity     I'm sure that I'm not the first to tell you but, I've been without trulicity for 3 weeks not. The pharmacy doesn't know when it will be back in stock.   Patient's daughter dropped office forms to be filled out for [address]  Patient will be moving in on 1/28, they need the forms asap they will need to get his medications ordered    Please fax #[phone]  Attn: Kiah Hagen or Sylwia    Forms placed in Dr Chris Salinas

## 2024-02-05 NOTE — PROGRESS NOTES
DIRECTED. Please fill per insurance coverage. 100 strip 1    metFORMIN (GLUCOPHAGE-XR) 500 MG extended release tablet TAKE 2 TABLETS BY MOUTH TWICE DAILY 120 tablet 11    clotrimazole (LOTRIMIN) 1 % cream Apply topically to affected areas twice daily as needed. 45 g 5    Respiratory Therapy Supplies [facility] BPAP machine and supplies. Pressure setting at 16/18 cm. 1 each 0    Misc. Devices [facility]) MISC 1 Device by Does not apply route daily 1 each 0    Handicap Placard MISC by Does not apply route Diagnosis: 343.9, 719.45, 722.52  Expires 12/31/2020 1 each 0    aspirin 81 MG tablet Take 81 mg by mouth daily.  omeprazole (PRILOSEC) 20 MG capsule Take 20 mg by mouth daily. No current facility-administered medications for this visit. No Known Allergies    Health Maintenance   Topic Date Due    Shingles Vaccine (1 of 2 - 2 Dose Series) 08/07/2015    Diabetic retinal exam  08/31/2018    Flu vaccine (1) 09/01/2018    Lipid screen  01/31/2019    Potassium monitoring  01/31/2019    Creatinine monitoring  01/31/2019    A1C test (Diabetic or Prediabetic)  08/21/2019    Diabetic microalbuminuria test  08/21/2019    Diabetic foot exam  08/28/2019    DTaP/Tdap/Td vaccine (2 - Td) 05/11/2025    Colon cancer screen colonoscopy  10/01/2025    Pneumococcal med risk  Completed    Hepatitis C screen  Completed    HIV screen  Completed       Subjective:      Review of Systems   HENT: Negative for trouble swallowing. Musculoskeletal: Positive for back pain. Negative for neck pain. Skin: Negative for rash. Objective:     Vitals:    10/05/18 1054   BP: 130/66   Site: Right Upper Arm   Position: Sitting   Cuff Size: Large Adult   Pulse: 99   SpO2: 97%   Weight: (!) 306 lb (138.8 kg)     Physical Exam   Constitutional: He is oriented to person, place, and time. He appears well-developed and well-nourished. No distress. HENT:   Head: Normocephalic and atraumatic.    Eyes: Conjunctivae are
Normal gait / station

## 2024-02-09 ENCOUNTER — OFFICE VISIT (OUTPATIENT)
Dept: PODIATRY | Age: 59
End: 2024-02-09

## 2024-02-09 VITALS — SYSTOLIC BLOOD PRESSURE: 118 MMHG | RESPIRATION RATE: 20 BRPM | DIASTOLIC BLOOD PRESSURE: 60 MMHG | HEART RATE: 76 BPM

## 2024-02-09 DIAGNOSIS — B35.1 DERMATOPHYTOSIS OF NAIL: ICD-10-CM

## 2024-02-09 DIAGNOSIS — E11.51 CONTROLLED TYPE 2 DM WITH PERIPHERAL CIRCULATORY DISORDER (HCC): Primary | ICD-10-CM

## 2024-02-09 NOTE — PROGRESS NOTES
Foot Care Worksheet  PCP: Gilda Thakkar DO  Last visit:  08 / 23 / 2023    Nail description: Thick , Yellow , Crumbly , Marked limitation of ambulation     Pain resulting from thickened and dystrophy of nail plate No    Nails involved  Right   1, 2, 3, 4, 5  (T5-T9)  Left     1, 2, 3, 4, 5  (TA-T4)    Q7 1 Class A Finding - Non traumatic amputation of foot No    Q8 2 Class B Findings - Absent DP pulse No, Absent PT pulse No, Advanced tropic changes (3 required) Yes    Decrease hair growth No, Nail changes/thickening Yes, Pigmented changes/discoloration Yes, Skin texture (thin, shiny) Yes, Skin color (rubor/redness) No    Q9 1 Class B and 2 Class C Findings  Claudication No, Temperature change Yes, Paresthesia Yes, Burning No, Edema Yes    Subjective:  Petey Ny is a 58 y.o. male who presents to the office today for routine DM foot care. Currently denies F/C/N/V.     Allergies   Allergen Reactions    Fenofibrate Rash       Past Medical History:   Diagnosis Date    Arthritis     Carpal tunnel syndrome     Bilateral.    Cerebral palsy (HCC)     Chest pain     Atypical, history of.    Chronic arthralgias of knees and hips     Degenerative disc disease     Lumbar.    Diabetes mellitus, type 2 (HCC)     Hip joint valgus deformity     Right hip secondary to muscle imbalance and cerebral palsy.    History of blood transfusion     History of kidney stones     Hypertension     Pyelonephritis 10/14/1988    Reflux     Sleep apnea     Spasticity     Severe, lower extremities, with multiple procedures to the hips and knees.    Ureteral calculi 06/04/1991    Right with obstruction.       Prior to Admission medications    Medication Sig Start Date End Date Taking? Authorizing Provider   metFORMIN (GLUCOPHAGE-XR) 500 MG extended release tablet Take 2 tablets by mouth with breakfast and with evening meal 1/8/24  Yes Gilda Thakkar DO   dulaglutide (TRULICITY) 1.5 MG/0.5ML SC injection Inject 0.5 mLs into the skin once a week

## 2024-04-19 ENCOUNTER — OFFICE VISIT (OUTPATIENT)
Dept: PODIATRY | Age: 59
End: 2024-04-19

## 2024-04-19 VITALS — SYSTOLIC BLOOD PRESSURE: 122 MMHG | HEART RATE: 61 BPM | DIASTOLIC BLOOD PRESSURE: 74 MMHG

## 2024-04-19 DIAGNOSIS — E11.51 CONTROLLED TYPE 2 DM WITH PERIPHERAL CIRCULATORY DISORDER (HCC): Primary | ICD-10-CM

## 2024-04-19 DIAGNOSIS — B35.1 DERMATOPHYTOSIS OF NAIL: ICD-10-CM

## 2024-04-19 NOTE — PROGRESS NOTES
and treatment options discussed in detail.   DM foot ed and exam.    All nails as mentioned above debrided in length and thickness.  Patient advised OTC methods for treatment of the mycotic nails.  Patient will follow up in 10 weeks.  Contact office with any questions/problems/concerns.

## 2024-05-06 ENCOUNTER — OFFICE VISIT (OUTPATIENT)
Dept: FAMILY MEDICINE CLINIC | Age: 59
End: 2024-05-06
Payer: COMMERCIAL

## 2024-05-06 ENCOUNTER — HOSPITAL ENCOUNTER (OUTPATIENT)
Age: 59
Discharge: HOME | End: 2024-05-06
Payer: COMMERCIAL

## 2024-05-06 VITALS
DIASTOLIC BLOOD PRESSURE: 72 MMHG | OXYGEN SATURATION: 99 % | HEART RATE: 81 BPM | SYSTOLIC BLOOD PRESSURE: 118 MMHG | WEIGHT: 258 LBS | HEIGHT: 73 IN | BODY MASS INDEX: 34.19 KG/M2 | RESPIRATION RATE: 18 BRPM | TEMPERATURE: 97.6 F

## 2024-05-06 DIAGNOSIS — E11.9 TYPE 2 DIABETES MELLITUS WITHOUT COMPLICATION, WITHOUT LONG-TERM CURRENT USE OF INSULIN (HCC): ICD-10-CM

## 2024-05-06 DIAGNOSIS — M65.312 TRIGGER THUMB OF LEFT HAND: ICD-10-CM

## 2024-05-06 DIAGNOSIS — I10 ESSENTIAL HYPERTENSION: ICD-10-CM

## 2024-05-06 DIAGNOSIS — E11.9 TYPE 2 DIABETES MELLITUS WITHOUT COMPLICATION, WITHOUT LONG-TERM CURRENT USE OF INSULIN (HCC): Primary | ICD-10-CM

## 2024-05-06 DIAGNOSIS — Z12.5 SCREENING FOR PROSTATE CANCER: ICD-10-CM

## 2024-05-06 DIAGNOSIS — R07.81 RIB PAIN ON RIGHT SIDE: ICD-10-CM

## 2024-05-06 DIAGNOSIS — G80.8 OTHER CEREBRAL PALSY (HCC): ICD-10-CM

## 2024-05-06 LAB
BASOPHILS # BLD: 0.06 K/UL (ref 0–0.2)
BASOPHILS NFR BLD: 1 % (ref 0–2)
EOSINOPHIL # BLD: 0.1 K/UL (ref 0–0.44)
EOSINOPHILS RELATIVE PERCENT: 1 % (ref 1–4)
ERYTHROCYTE [DISTWIDTH] IN BLOOD BY AUTOMATED COUNT: 15.8 % (ref 11.8–14.4)
EST. AVERAGE GLUCOSE BLD GHB EST-MCNC: 157 MG/DL
HBA1C MFR BLD: 7.1 % (ref 4–6)
HCT VFR BLD AUTO: 41.5 % (ref 40.7–50.3)
HGB BLD-MCNC: 13.1 G/DL (ref 13–17)
IMM GRANULOCYTES # BLD AUTO: 0.04 K/UL (ref 0–0.3)
IMM GRANULOCYTES NFR BLD: 1 %
LYMPHOCYTES NFR BLD: 1.62 K/UL (ref 1.1–3.7)
LYMPHOCYTES RELATIVE PERCENT: 19 % (ref 24–43)
MCH RBC QN AUTO: 23.1 PG (ref 25.2–33.5)
MCHC RBC AUTO-ENTMCNC: 31.6 G/DL (ref 25.2–33.5)
MCV RBC AUTO: 73.3 FL (ref 82.6–102.9)
MONOCYTES NFR BLD: 0.47 K/UL (ref 0.1–1.2)
MONOCYTES NFR BLD: 6 % (ref 3–12)
NEUTROPHILS NFR BLD: 72 % (ref 36–65)
NEUTS SEG NFR BLD: 6.16 K/UL (ref 1.5–8.1)
NRBC BLD-RTO: 0 PER 100 WBC
PLATELET # BLD AUTO: 223 K/UL (ref 138–453)
PMV BLD AUTO: 9.3 FL (ref 8.1–13.5)
PSA SERPL-MCNC: 0.4 NG/ML (ref 0–4)
RBC # BLD AUTO: 5.66 M/UL (ref 4.21–5.77)
RBC # BLD: ABNORMAL 10*6/UL
WBC OTHER # BLD: 8.5 K/UL (ref 3.5–11.3)

## 2024-05-06 PROCEDURE — 85025 COMPLETE CBC W/AUTO DIFF WBC: CPT

## 2024-05-06 PROCEDURE — 3078F DIAST BP <80 MM HG: CPT | Performed by: FAMILY MEDICINE

## 2024-05-06 PROCEDURE — G0103 PSA SCREENING: HCPCS

## 2024-05-06 PROCEDURE — 3074F SYST BP LT 130 MM HG: CPT | Performed by: FAMILY MEDICINE

## 2024-05-06 PROCEDURE — 3017F COLORECTAL CA SCREEN DOC REV: CPT | Performed by: FAMILY MEDICINE

## 2024-05-06 PROCEDURE — 2022F DILAT RTA XM EVC RTNOPTHY: CPT | Performed by: FAMILY MEDICINE

## 2024-05-06 PROCEDURE — 1036F TOBACCO NON-USER: CPT | Performed by: FAMILY MEDICINE

## 2024-05-06 PROCEDURE — G8417 CALC BMI ABV UP PARAM F/U: HCPCS | Performed by: FAMILY MEDICINE

## 2024-05-06 PROCEDURE — 3051F HG A1C>EQUAL 7.0%<8.0%: CPT | Performed by: FAMILY MEDICINE

## 2024-05-06 PROCEDURE — G8427 DOCREV CUR MEDS BY ELIG CLIN: HCPCS | Performed by: FAMILY MEDICINE

## 2024-05-06 PROCEDURE — 99214 OFFICE O/P EST MOD 30 MIN: CPT | Performed by: FAMILY MEDICINE

## 2024-05-06 PROCEDURE — 83036 HEMOGLOBIN GLYCOSYLATED A1C: CPT

## 2024-05-06 PROCEDURE — 36415 COLL VENOUS BLD VENIPUNCTURE: CPT

## 2024-05-06 RX ORDER — PREDNISONE 20 MG/1
TABLET ORAL
Qty: 15 TABLET | Refills: 0 | Status: SHIPPED | OUTPATIENT
Start: 2024-05-06

## 2024-05-06 SDOH — ECONOMIC STABILITY: FOOD INSECURITY: WITHIN THE PAST 12 MONTHS, THE FOOD YOU BOUGHT JUST DIDN'T LAST AND YOU DIDN'T HAVE MONEY TO GET MORE.: NEVER TRUE

## 2024-05-06 SDOH — ECONOMIC STABILITY: FOOD INSECURITY: WITHIN THE PAST 12 MONTHS, YOU WORRIED THAT YOUR FOOD WOULD RUN OUT BEFORE YOU GOT MONEY TO BUY MORE.: NEVER TRUE

## 2024-05-06 SDOH — ECONOMIC STABILITY: INCOME INSECURITY: HOW HARD IS IT FOR YOU TO PAY FOR THE VERY BASICS LIKE FOOD, HOUSING, MEDICAL CARE, AND HEATING?: NOT HARD AT ALL

## 2024-05-06 ASSESSMENT — PATIENT HEALTH QUESTIONNAIRE - PHQ9
2. FEELING DOWN, DEPRESSED OR HOPELESS: NOT AT ALL
SUM OF ALL RESPONSES TO PHQ QUESTIONS 1-9: 0
1. LITTLE INTEREST OR PLEASURE IN DOING THINGS: NOT AT ALL
SUM OF ALL RESPONSES TO PHQ9 QUESTIONS 1 & 2: 0
SUM OF ALL RESPONSES TO PHQ QUESTIONS 1-9: 0

## 2024-05-06 NOTE — PROGRESS NOTES
[facility]  [address] [id number]  [phone]      Petey Ny is a 58 y.o. male who presents today for his medical conditions/complaints as noted below.  Petey Ny is c/o of left thumb sticking (X 1 mo ), Diabetes, and Back Pain (Right lower x2 mo)      HPI:     Pt here today for follow-up of DM and HTN.    Has noticed L thumb clicking x past month.  No pain or swelling.  Not keeping him from doing anything or causing frequent drops.    Diet has been \"decent\" recently  Was not able to get Trulicity for almost 4 months; just re-started taking it 3 weeks ago  1.5 mg once weekly on Sunday's x past 3 weeks    Wondering about getting off Metformin    Has noticed R back pain x past 2-3 months - daily  Mostly at night; will wake himself up moaning in his sleep  Usually sleeps on his L side in bed  No recent injury or fall  Does not notice it much during the day  Urine has no odor and light yellow; no dysuria or hematuria  Denies urinary frequency  Has h/o kidney stones - has lost a lot of feeling since his last episode so hard to tell if it feels the same    Tried Flexeril - no difference  Has not tried anything else        Past Medical History:   Diagnosis Date    Arthritis     Carpal tunnel syndrome     Bilateral.    Cerebral palsy (HCC)     Chest pain     Atypical, history of.    Chronic arthralgias of knees and hips     Degenerative disc disease     Lumbar.    Diabetes mellitus, type 2 (HCC)     Hip joint valgus deformity     Right hip secondary to muscle imbalance and cerebral palsy.    History of blood transfusion     History of kidney stones     Hypertension     Pyelonephritis 10/14/1988    Reflux     Sleep apnea     Spasticity     Severe, lower extremities, with multiple procedures to the hips and knees.    Ureteral calculi 06/04/1991    Right with obstruction.      Past Surgical History:   Procedure Laterality Date    CARPAL TUNNEL RELEASE Right

## 2024-05-13 ASSESSMENT — ENCOUNTER SYMPTOMS: BACK PAIN: 1

## 2024-06-30 DIAGNOSIS — I10 ESSENTIAL HYPERTENSION: ICD-10-CM

## 2024-07-01 NOTE — TELEPHONE ENCOUNTER
Petey called requesting a refill of the below medication which has been pended for you:     Requested Prescriptions     Pending Prescriptions Disp Refills    metoprolol tartrate (LOPRESSOR) 50 MG tablet [Pharmacy Med Name: METOPROLOL TARTRATE 50MG TABLETS] 180 tablet 3     Sig: TAKE 1 TABLET BY MOUTH TWICE DAILY       Last Appointment Date: 5/6/2024  Next Appointment Date: 11/6/2024    Allergies   Allergen Reactions    Fenofibrate Rash

## 2024-07-02 RX ORDER — METOPROLOL TARTRATE 50 MG/1
50 TABLET, FILM COATED ORAL 2 TIMES DAILY
Qty: 180 TABLET | Refills: 3 | Status: SHIPPED | OUTPATIENT
Start: 2024-07-02

## 2024-07-10 ENCOUNTER — OFFICE VISIT (OUTPATIENT)
Dept: PODIATRY | Age: 59
End: 2024-07-10
Payer: COMMERCIAL

## 2024-07-10 VITALS — HEART RATE: 76 BPM | SYSTOLIC BLOOD PRESSURE: 120 MMHG | DIASTOLIC BLOOD PRESSURE: 60 MMHG | RESPIRATION RATE: 20 BRPM

## 2024-07-10 DIAGNOSIS — E11.51 CONTROLLED TYPE 2 DM WITH PERIPHERAL CIRCULATORY DISORDER (HCC): Primary | ICD-10-CM

## 2024-07-10 DIAGNOSIS — B35.1 DERMATOPHYTOSIS OF NAIL: ICD-10-CM

## 2024-07-10 PROCEDURE — 11721 DEBRIDE NAIL 6 OR MORE: CPT | Performed by: PODIATRIST

## 2024-07-10 PROCEDURE — 99999 PR OFFICE/OUTPT VISIT,PROCEDURE ONLY: CPT | Performed by: PODIATRIST

## 2024-07-10 NOTE — PROGRESS NOTES
tablets by mouth with breakfast and with evening meal 1/8/24  Yes Gilda Thakkar DO   dulaglutide (TRULICITY) 1.5 MG/0.5ML SC injection Inject 0.5 mLs into the skin once a week 1/4/24  Yes Gilda Thakkar DO   cyclobenzaprine (FLEXERIL) 10 MG tablet Take 1 tablet by mouth 2 times daily as needed for Muscle spasms 8/23/23  Yes Gilda Thakkar DO   Cranberry 1000 MG CAPS Take by mouth   Yes Negrita Boo MD   NONFORMULARY Uses medical marijuana card, gummies once daily   Yes Negrita Boo MD   rosuvastatin (CRESTOR) 20 MG tablet Take 1 tablet by mouth nightly 8/22/23  Yes Loly Alcazar DO   ezetimibe (ZETIA) 10 MG tablet Take 1 tablet by mouth daily 8/22/23  Yes Loly Alcazar DO   lisinopril-hydroCHLOROthiazide (PRINZIDE;ZESTORETIC) 20-25 MG per tablet Take 1 tablet by mouth daily 8/10/23  Yes Gilda Thakkar DO   aspirin 81 MG tablet Take 1 tablet by mouth daily   Yes Negrita Boo MD   omeprazole (PRILOSEC) 20 MG delayed release capsule Take 1 capsule by mouth daily   Yes Negrita Boo MD   predniSONE (DELTASONE) 20 MG tablet Take 2 tabs by mouth daily x 5 days, then 1 tab daily x 5 days. 5/6/24   Gilda Thakkar DO       Past Surgical History:   Procedure Laterality Date    CARPAL TUNNEL RELEASE Right 09/21/2009    CHOLECYSTECTOMY  09/14/2016    laparscopic w/op grams    COLONOSCOPY  10/1/15    normal    HIP OSTEOTOMY Left 1989    HIP OSTEOTOMY Right 1983    Varus derotation.    LITHOTRIPSY Right 06/04/1991    With cystoscopy and ureteral catheter placement.    LUMBAR FUSION  June 2005    L4-L5.    OTHER SURGICAL HISTORY      pelvic surgery    TOTAL HIP ARTHROPLASTY Left 1999       Family History   Problem Relation Age of Onset    Heart Disease Mother     Heart Disease Father     Heart Attack Brother         age 51    Heart Disease Paternal Uncle        Social History     Tobacco Use    Smoking status: Former     Current packs/day: 0.00     Types: Cigarettes, Cigars     Quit

## 2024-07-30 DIAGNOSIS — I10 ESSENTIAL HYPERTENSION: ICD-10-CM

## 2024-07-30 NOTE — TELEPHONE ENCOUNTER
Petey called requesting a refill of the below medication which has been pended for you:     Requested Prescriptions     Pending Prescriptions Disp Refills    lisinopril-hydroCHLOROthiazide (PRINZIDE;ZESTORETIC) 20-25 MG per tablet [Pharmacy Med Name: LISINOPRIL-HCTZ 20/25MG TABLETS] 90 tablet 0     Sig: TAKE 1 TABLET BY MOUTH DAILY       Last Appointment Date: 5/6/2024  Next Appointment Date: 11/6/2024    Allergies   Allergen Reactions    Fenofibrate Rash

## 2024-08-02 RX ORDER — LISINOPRIL AND HYDROCHLOROTHIAZIDE 25; 20 MG/1; MG/1
1 TABLET ORAL DAILY
Qty: 90 TABLET | Refills: 3 | Status: SHIPPED | OUTPATIENT
Start: 2024-08-02

## 2024-08-08 DIAGNOSIS — E78.00 PURE HYPERCHOLESTEROLEMIA: ICD-10-CM

## 2024-08-08 DIAGNOSIS — I10 HYPERTENSION, ESSENTIAL: ICD-10-CM

## 2024-08-08 DIAGNOSIS — E11.9 CONTROLLED TYPE 2 DIABETES MELLITUS WITHOUT COMPLICATION, UNSPECIFIED WHETHER LONG TERM INSULIN USE (HCC): ICD-10-CM

## 2024-08-08 DIAGNOSIS — R06.02 SOB (SHORTNESS OF BREATH): ICD-10-CM

## 2024-08-08 DIAGNOSIS — I50.23 CHF NYHA CLASS II, ACUTE ON CHRONIC, SYSTOLIC (HCC): ICD-10-CM

## 2024-08-08 DIAGNOSIS — R94.31 ABNORMAL ECG: ICD-10-CM

## 2024-08-08 DIAGNOSIS — R00.2 PALPITATIONS: ICD-10-CM

## 2024-08-08 RX ORDER — EZETIMIBE 10 MG/1
10 TABLET ORAL DAILY
Qty: 90 TABLET | Refills: 3 | Status: SHIPPED | OUTPATIENT
Start: 2024-08-08

## 2024-08-08 RX ORDER — ROSUVASTATIN CALCIUM 20 MG/1
20 TABLET, COATED ORAL NIGHTLY
Qty: 90 TABLET | Refills: 3 | Status: SHIPPED | OUTPATIENT
Start: 2024-08-08

## 2024-09-18 ENCOUNTER — OFFICE VISIT (OUTPATIENT)
Dept: PODIATRY | Age: 59
End: 2024-09-18
Payer: COMMERCIAL

## 2024-09-18 VITALS — HEART RATE: 76 BPM | RESPIRATION RATE: 20 BRPM | SYSTOLIC BLOOD PRESSURE: 118 MMHG | DIASTOLIC BLOOD PRESSURE: 68 MMHG

## 2024-09-18 DIAGNOSIS — E11.51 CONTROLLED TYPE 2 DM WITH PERIPHERAL CIRCULATORY DISORDER (HCC): Primary | ICD-10-CM

## 2024-09-18 DIAGNOSIS — B35.1 DERMATOPHYTOSIS OF NAIL: ICD-10-CM

## 2024-09-18 PROCEDURE — 11721 DEBRIDE NAIL 6 OR MORE: CPT | Performed by: PODIATRIST

## 2024-09-18 PROCEDURE — 99999 PR OFFICE/OUTPT VISIT,PROCEDURE ONLY: CPT | Performed by: PODIATRIST

## 2024-09-29 DIAGNOSIS — E11.9 TYPE 2 DIABETES MELLITUS WITHOUT COMPLICATION, WITHOUT LONG-TERM CURRENT USE OF INSULIN (HCC): ICD-10-CM

## 2024-09-30 NOTE — TELEPHONE ENCOUNTER
Petey called requesting a refill of the below medication which has been pended for you:     Requested Prescriptions     Pending Prescriptions Disp Refills    TRULICITY 1.5 MG/0.5ML SC injection [Pharmacy Med Name: TRULICITY 1.5MG/0.5ML INJ (4 PENS)] 6 mL 1     Sig: ADMINISTER 1.5 MG UNDER THE SKIN 1 TIME A WEEK       Last Appointment Date: 5/6/2024  Next Appointment Date: 11/13/2024    Allergies   Allergen Reactions    Fenofibrate Rash

## 2024-10-04 DIAGNOSIS — E11.9 TYPE 2 DIABETES MELLITUS WITHOUT COMPLICATION, WITHOUT LONG-TERM CURRENT USE OF INSULIN (HCC): ICD-10-CM

## 2024-10-04 RX ORDER — DULAGLUTIDE 1.5 MG/.5ML
1.5 INJECTION, SOLUTION SUBCUTANEOUS WEEKLY
Qty: 6 ML | Refills: 1 | Status: CANCELLED | OUTPATIENT
Start: 2024-10-04

## 2024-10-04 RX ORDER — DULAGLUTIDE 1.5 MG/.5ML
1.5 INJECTION, SOLUTION SUBCUTANEOUS WEEKLY
Qty: 6 ML | Refills: 3 | Status: SHIPPED | OUTPATIENT
Start: 2024-10-04

## 2024-11-13 ENCOUNTER — HOSPITAL ENCOUNTER (OUTPATIENT)
Age: 59
Discharge: HOME | End: 2024-11-13
Payer: COMMERCIAL

## 2024-11-13 ENCOUNTER — OFFICE VISIT (OUTPATIENT)
Dept: FAMILY MEDICINE CLINIC | Age: 59
End: 2024-11-13
Payer: COMMERCIAL

## 2024-11-13 VITALS
HEIGHT: 73 IN | OXYGEN SATURATION: 99 % | RESPIRATION RATE: 18 BRPM | DIASTOLIC BLOOD PRESSURE: 68 MMHG | BODY MASS INDEX: 33.93 KG/M2 | HEART RATE: 83 BPM | SYSTOLIC BLOOD PRESSURE: 122 MMHG | TEMPERATURE: 97.4 F | WEIGHT: 256 LBS

## 2024-11-13 DIAGNOSIS — E11.9 TYPE 2 DIABETES MELLITUS WITHOUT COMPLICATION, WITHOUT LONG-TERM CURRENT USE OF INSULIN (HCC): Primary | ICD-10-CM

## 2024-11-13 DIAGNOSIS — E11.9 TYPE 2 DIABETES MELLITUS WITHOUT COMPLICATION, WITHOUT LONG-TERM CURRENT USE OF INSULIN (HCC): ICD-10-CM

## 2024-11-13 DIAGNOSIS — I47.10 PAROXYSMAL SUPRAVENTRICULAR TACHYCARDIA (HCC): ICD-10-CM

## 2024-11-13 DIAGNOSIS — I10 ESSENTIAL HYPERTENSION: ICD-10-CM

## 2024-11-13 DIAGNOSIS — E78.2 MIXED HYPERLIPIDEMIA: ICD-10-CM

## 2024-11-13 LAB
ALBUMIN SERPL-MCNC: 4.2 G/DL (ref 3.5–5.2)
ALBUMIN/GLOB SERPL: 1.4 {RATIO} (ref 1–2.5)
ALP SERPL-CCNC: 78 U/L (ref 40–129)
ALT SERPL-CCNC: 20 U/L (ref 5–41)
ANION GAP SERPL CALCULATED.3IONS-SCNC: 12 MMOL/L (ref 9–17)
AST SERPL-CCNC: 20 U/L
BILIRUB SERPL-MCNC: 0.5 MG/DL (ref 0.3–1.2)
BUN SERPL-MCNC: 13 MG/DL (ref 6–20)
BUN/CREAT SERPL: 14 (ref 9–20)
CALCIUM SERPL-MCNC: 9.8 MG/DL (ref 8.6–10.4)
CHLORIDE SERPL-SCNC: 103 MMOL/L (ref 98–107)
CHOLEST SERPL-MCNC: 65 MG/DL (ref 0–199)
CHOLESTEROL/HDL RATIO: 3
CO2 SERPL-SCNC: 26 MMOL/L (ref 20–31)
CREAT SERPL-MCNC: 0.9 MG/DL (ref 0.7–1.2)
CREAT UR-MCNC: 192 MG/DL (ref 39–259)
EST. AVERAGE GLUCOSE BLD GHB EST-MCNC: 128 MG/DL
GFR, ESTIMATED: >90 ML/MIN/1.73M2
GLUCOSE SERPL-MCNC: 125 MG/DL (ref 70–99)
HBA1C MFR BLD: 6.1 % (ref 4–6)
HDLC SERPL-MCNC: 22 MG/DL
LDLC SERPL CALC-MCNC: 3 MG/DL (ref 0–100)
MICROALBUMIN UR-MCNC: <12 MG/L (ref 0–20)
MICROALBUMIN/CREAT UR-RTO: NORMAL MCG/MG CREAT (ref 0–17)
POTASSIUM SERPL-SCNC: 4.9 MMOL/L (ref 3.7–5.3)
PROT SERPL-MCNC: 7.3 G/DL (ref 6.4–8.3)
SODIUM SERPL-SCNC: 141 MMOL/L (ref 135–144)
TRIGL SERPL-MCNC: 199 MG/DL
VLDLC SERPL CALC-MCNC: 40 MG/DL (ref 1–30)

## 2024-11-13 PROCEDURE — 82570 ASSAY OF URINE CREATININE: CPT

## 2024-11-13 PROCEDURE — G8427 DOCREV CUR MEDS BY ELIG CLIN: HCPCS | Performed by: FAMILY MEDICINE

## 2024-11-13 PROCEDURE — 3074F SYST BP LT 130 MM HG: CPT | Performed by: FAMILY MEDICINE

## 2024-11-13 PROCEDURE — 3017F COLORECTAL CA SCREEN DOC REV: CPT | Performed by: FAMILY MEDICINE

## 2024-11-13 PROCEDURE — G8484 FLU IMMUNIZE NO ADMIN: HCPCS | Performed by: FAMILY MEDICINE

## 2024-11-13 PROCEDURE — 83036 HEMOGLOBIN GLYCOSYLATED A1C: CPT

## 2024-11-13 PROCEDURE — 80053 COMPREHEN METABOLIC PANEL: CPT

## 2024-11-13 PROCEDURE — 2022F DILAT RTA XM EVC RTNOPTHY: CPT | Performed by: FAMILY MEDICINE

## 2024-11-13 PROCEDURE — 36415 COLL VENOUS BLD VENIPUNCTURE: CPT

## 2024-11-13 PROCEDURE — 82043 UR ALBUMIN QUANTITATIVE: CPT

## 2024-11-13 PROCEDURE — 3078F DIAST BP <80 MM HG: CPT | Performed by: FAMILY MEDICINE

## 2024-11-13 PROCEDURE — 3044F HG A1C LEVEL LT 7.0%: CPT | Performed by: FAMILY MEDICINE

## 2024-11-13 PROCEDURE — 99214 OFFICE O/P EST MOD 30 MIN: CPT | Performed by: FAMILY MEDICINE

## 2024-11-13 PROCEDURE — 80061 LIPID PANEL: CPT

## 2024-11-13 PROCEDURE — G8417 CALC BMI ABV UP PARAM F/U: HCPCS | Performed by: FAMILY MEDICINE

## 2024-11-13 PROCEDURE — 1036F TOBACCO NON-USER: CPT | Performed by: FAMILY MEDICINE

## 2024-11-13 NOTE — PROGRESS NOTES
tablet by mouth daily      omeprazole (PRILOSEC) 20 MG delayed release capsule Take 1 capsule by mouth daily       No current facility-administered medications for this visit.     Allergies   Allergen Reactions    Fenofibrate Rash       Health Maintenance   Topic Date Due    Hepatitis B vaccine (1 of 3 - 19+ 3-dose series) Never done    Shingles vaccine (1 of 2) Never done    Pneumococcal 0-64 years Vaccine (2 of 2 - PCV) 11/13/2016    COVID-19 Vaccine (1 - 2023-24 season) Never done    Diabetic retinal exam  12/14/2024    Flu vaccine (1) 11/13/2025 (Originally 8/1/2024)    DTaP/Tdap/Td vaccine (2 - Td or Tdap) 05/11/2025    Diabetic foot exam  09/18/2025    Colorectal Cancer Screen  10/01/2025    A1C test (Diabetic or Prediabetic)  11/13/2025    Diabetic Alb to Cr ratio (uACR) test  11/13/2025    Lipids  11/13/2025    GFR test (Diabetes, CKD 3-4, OR last GFR 15-59)  11/13/2025    Depression Screen  11/15/2025    Annual Wellness Visit (Medicare)  11/16/2025    Hepatitis C screen  Completed    HIV screen  Completed    Hepatitis A vaccine  Aged Out    Hib vaccine  Aged Out    Polio vaccine  Aged Out    Meningococcal (ACWY) vaccine  Aged Out    Prostate Specific Antigen (PSA) Screening or Monitoring  Discontinued       Subjective:      Review of Systems   Cardiovascular:  Positive for palpitations (infrequent). Negative for chest pain.   Gastrointestinal:  Negative for blood in stool.   Genitourinary:  Negative for dysuria and hematuria.   Skin:  Negative for rash.   Psychiatric/Behavioral:  Negative for dysphoric mood. The patient is not nervous/anxious.        Objective:     Vitals:    11/13/24 0853   BP: 122/68   Site: Right Upper Arm   Position: Sitting   Pulse: 83   Resp: 18   Temp: 97.4 °F (36.3 °C)   TempSrc: Temporal   SpO2: 99%   Weight: 116.1 kg (256 lb)   Height: 1.854 m (6' 1\")     Physical Exam  Vitals and nursing note reviewed.   Constitutional:       General: He is not in acute distress.     Appearance:

## 2024-11-15 ENCOUNTER — TELEMEDICINE (OUTPATIENT)
Dept: FAMILY MEDICINE CLINIC | Age: 59
End: 2024-11-15

## 2024-11-15 DIAGNOSIS — Z00.00 MEDICARE ANNUAL WELLNESS VISIT, SUBSEQUENT: Primary | ICD-10-CM

## 2024-11-15 SDOH — HEALTH STABILITY: PHYSICAL HEALTH: ON AVERAGE, HOW MANY DAYS PER WEEK DO YOU ENGAGE IN MODERATE TO STRENUOUS EXERCISE (LIKE A BRISK WALK)?: 7 DAYS

## 2024-11-15 SDOH — HEALTH STABILITY: PHYSICAL HEALTH: ON AVERAGE, HOW MANY MINUTES DO YOU ENGAGE IN EXERCISE AT THIS LEVEL?: 40 MIN

## 2024-11-15 ASSESSMENT — PATIENT HEALTH QUESTIONNAIRE - PHQ9
SUM OF ALL RESPONSES TO PHQ QUESTIONS 1-9: 0
SUM OF ALL RESPONSES TO PHQ QUESTIONS 1-9: 0
2. FEELING DOWN, DEPRESSED OR HOPELESS: NOT AT ALL
SUM OF ALL RESPONSES TO PHQ QUESTIONS 1-9: 0
SUM OF ALL RESPONSES TO PHQ QUESTIONS 1-9: 0
1. LITTLE INTEREST OR PLEASURE IN DOING THINGS: NOT AT ALL
SUM OF ALL RESPONSES TO PHQ9 QUESTIONS 1 & 2: 0

## 2024-11-15 ASSESSMENT — LIFESTYLE VARIABLES
HOW MANY STANDARD DRINKS CONTAINING ALCOHOL DO YOU HAVE ON A TYPICAL DAY: 1
HOW OFTEN DO YOU HAVE A DRINK CONTAINING ALCOHOL: 2-4 TIMES A MONTH
HOW MANY STANDARD DRINKS CONTAINING ALCOHOL DO YOU HAVE ON A TYPICAL DAY: 1 OR 2
HOW OFTEN DO YOU HAVE SIX OR MORE DRINKS ON ONE OCCASION: 1
HOW OFTEN DO YOU HAVE A DRINK CONTAINING ALCOHOL: 3

## 2024-11-15 NOTE — PATIENT INSTRUCTIONS
help you develop a safe and effective exercise program.  A counselor or psychiatrist can help you cope with issues such as depression, anxiety, or family problems that can make it hard to focus on weight loss.  Consider joining a support group for people who are trying to lose weight. Your doctor can suggest groups in your area.  Where can you learn more?  Go to https://www.[employer].net/patientEd and enter U357 to learn more about \"Starting a Weight Loss Plan: Care Instructions.\"  Current as of: September 20, 2023  Content Version: 14.2  © 2024 [employer].   Care instructions adapted under license by [employer]. If you have questions about a medical condition or this instruction, always ask your healthcare professional. [employer] disclaims any warranty or liability for your use of this information.           Advance Directives: Care Instructions  Overview  An advance directive is a legal way to state your wishes at the end of your life. It tells your family and your doctor what to do if you can't say what you want.  There are two main types of advance directives. You can change them any time your wishes change.  Living will.  This form tells your family and your doctor your wishes about life support and other treatment. The form is also called a declaration.  Medical power of .  This form lets you name a person to make treatment decisions for you when you can't speak for yourself. This person is called a health care agent (health care proxy, health care surrogate). The form is also called a durable power of  for health care.  If you do not have an advance directive, decisions about your medical care may be made by a family member, or by a doctor or a  who doesn't know you.  It may help to think of an advance directive as a gift to the people who care for you. If you have one, they won't have to make tough decisions by themselves.  For more information, including

## 2024-11-15 NOTE — PROGRESS NOTES
Medicare Annual Wellness Visit    Petey Ny is here for Medicare AWV    Assessment & Plan   Medicare annual wellness visit, subsequent    Recommendations for Preventive Services Due: see orders and patient instructions/AVS.  Recommended screening schedule for the next 5-10 years is provided to the patient in written form: see Patient Instructions/AVS.     Return in 1 year (on 11/15/2025) for Medicare AWV.     Subjective     Patient's complete Health Risk Assessment and screening values have been reviewed and are found in Flowsheets. The following problems were reviewed today and where indicated follow up appointments were made and/or referrals ordered.    Positive Risk Factor Screenings with Interventions:                Abnormal BMI (obese):  There is no height or weight on file to calculate BMI. (!) Abnormal  Interventions:  Pt works out daily for 30-40 min, eats healthy meals           Advanced Directives:  Do you have a Living Will?: (!) No    Intervention:  has NO advanced directive - information provided          Objective    Patient-Reported Vitals  No data recorded              Allergies   Allergen Reactions    Fenofibrate Rash     Prior to Visit Medications    Medication Sig Taking? Authorizing Provider   dulaglutide (TRULICITY) 1.5 MG/0.5ML SC injection Inject 0.5 mLs into the skin once a week  Gilda Thakkar, DO   rosuvastatin (CRESTOR) 20 MG tablet Take 1 tablet by mouth nightly  Blanca Smith APRN - CNP   ezetimibe (ZETIA) 10 MG tablet Take 1 tablet by mouth daily  Blanca Smith APRN - CNP   lisinopril-hydroCHLOROthiazide (PRINZIDE;ZESTORETIC) 20-25 MG per tablet Take 1 tablet by mouth daily  Gilda Thakkar DO   metoprolol tartrate (LOPRESSOR) 50 MG tablet Take 1 tablet by mouth 2 times daily  Gilda Thakkar, DO   TURMERIC PO Take by mouth  Provider, MD Negrita   metFORMIN (GLUCOPHAGE-XR) 500 MG extended release tablet Take 2 tablets by mouth with breakfast and with evening meal

## 2024-12-03 ENCOUNTER — OFFICE VISIT (OUTPATIENT)
Dept: PODIATRY | Age: 59
End: 2024-12-03
Payer: COMMERCIAL

## 2024-12-03 VITALS — RESPIRATION RATE: 20 BRPM | HEART RATE: 80 BPM | SYSTOLIC BLOOD PRESSURE: 124 MMHG | DIASTOLIC BLOOD PRESSURE: 62 MMHG

## 2024-12-03 DIAGNOSIS — E11.51 CONTROLLED TYPE 2 DM WITH PERIPHERAL CIRCULATORY DISORDER (HCC): Primary | ICD-10-CM

## 2024-12-03 DIAGNOSIS — B35.1 DERMATOPHYTOSIS OF NAIL: ICD-10-CM

## 2024-12-03 PROCEDURE — 11721 DEBRIDE NAIL 6 OR MORE: CPT | Performed by: PODIATRIST

## 2024-12-03 PROCEDURE — 99999 PR OFFICE/OUTPT VISIT,PROCEDURE ONLY: CPT | Performed by: PODIATRIST

## 2024-12-03 NOTE — PROGRESS NOTES
ezetimibe (ZETIA) 10 MG tablet Take 1 tablet by mouth daily 24  Yes Blanca Smith APRN - CNP   lisinopril-hydroCHLOROthiazide (PRINZIDE;ZESTORETIC) 20-25 MG per tablet Take 1 tablet by mouth daily 24  Yes Gilda Thakkar DO   metoprolol tartrate (LOPRESSOR) 50 MG tablet Take 1 tablet by mouth 2 times daily 24  Yes Gilda Thakkar DO   TURMERIC PO Take by mouth   Yes Negrita Boo MD   metFORMIN (GLUCOPHAGE-XR) 500 MG extended release tablet Take 2 tablets by mouth with breakfast and with evening meal 24  Yes Gilda Thakkar DO   cyclobenzaprine (FLEXERIL) 10 MG tablet Take 1 tablet by mouth 2 times daily as needed for Muscle spasms 23  Yes Gilda Thakkar DO   Cranberry 1000 MG CAPS Take by mouth   Yes Negrita Boo MD   NONFORMULARY Uses medical marijuana card, gummies once daily   Yes Negrita Boo MD   aspirin 81 MG tablet Take 1 tablet by mouth daily   Yes Negrita Boo MD   omeprazole (PRILOSEC) 20 MG delayed release capsule Take 1 capsule by mouth daily   Yes Negrita Boo MD       Past Surgical History:   Procedure Laterality Date    CARPAL TUNNEL RELEASE Right 2009    CHOLECYSTECTOMY  2016    laparscopic w/op grams    COLONOSCOPY  10/1/15    normal    HIP OSTEOTOMY Left     HIP OSTEOTOMY Right     Varus derotation.    LITHOTRIPSY Right 1991    With cystoscopy and ureteral catheter placement.    LUMBAR FUSION  2005    L4-L5.    OTHER SURGICAL HISTORY      pelvic surgery    TOTAL HIP ARTHROPLASTY Left        Family History   Problem Relation Age of Onset    Heart Disease Mother     Heart Disease Father     Heart Attack Brother         age 51    Heart Disease Paternal Uncle        Social History     Tobacco Use    Smoking status: Former     Current packs/day: 0.00     Types: Cigarettes, Cigars     Quit date: 10/9/1999     Years since quittin.1    Smokeless tobacco: Never    Tobacco comments:     quit

## 2024-12-17 ENCOUNTER — PATIENT MESSAGE (OUTPATIENT)
Dept: FAMILY MEDICINE CLINIC | Age: 59
End: 2024-12-17

## 2024-12-28 DIAGNOSIS — E11.9 TYPE 2 DIABETES MELLITUS WITHOUT COMPLICATION, WITHOUT LONG-TERM CURRENT USE OF INSULIN (HCC): ICD-10-CM

## 2024-12-31 RX ORDER — METFORMIN HYDROCHLORIDE 500 MG/1
1000 TABLET, EXTENDED RELEASE ORAL 2 TIMES DAILY WITH MEALS
Qty: 360 TABLET | Refills: 3 | Status: SHIPPED | OUTPATIENT
Start: 2024-12-31

## 2025-01-09 DIAGNOSIS — M62.838 MUSCLE SPASTICITY: ICD-10-CM

## 2025-01-09 NOTE — TELEPHONE ENCOUNTER
Patient reports he \"needs new script, has a week supply left.\"    Petey called requesting a refill of the below medication which has been pended for you:     Requested Prescriptions     Pending Prescriptions Disp Refills    cyclobenzaprine (FLEXERIL) 10 MG tablet [Pharmacy Med Name: CYCLOBENZAPRINE 10MG TABLETS] 180 tablet 1     Sig: TAKE 1 TABLET BY MOUTH TWICE DAILY AS NEEDED FOR MUSCLE SPASMS       Last Appointment Date: 11/15/2024  Next Appointment Date: 5/13/2025    Allergies   Allergen Reactions    Fenofibrate Rash

## 2025-01-11 RX ORDER — CYCLOBENZAPRINE HCL 10 MG
10 TABLET ORAL 2 TIMES DAILY PRN
Qty: 180 TABLET | Refills: 3 | Status: SHIPPED | OUTPATIENT
Start: 2025-01-11

## 2025-02-11 ENCOUNTER — OFFICE VISIT (OUTPATIENT)
Dept: PODIATRY | Age: 60
End: 2025-02-11
Payer: COMMERCIAL

## 2025-02-11 VITALS — SYSTOLIC BLOOD PRESSURE: 124 MMHG | DIASTOLIC BLOOD PRESSURE: 70 MMHG | HEART RATE: 80 BPM

## 2025-02-11 DIAGNOSIS — B35.1 DERMATOPHYTOSIS OF NAIL: ICD-10-CM

## 2025-02-11 DIAGNOSIS — E11.51 CONTROLLED TYPE 2 DM WITH PERIPHERAL CIRCULATORY DISORDER (HCC): Primary | ICD-10-CM

## 2025-02-11 PROCEDURE — 11721 DEBRIDE NAIL 6 OR MORE: CPT | Performed by: PODIATRIST

## 2025-02-11 PROCEDURE — 99999 PR OFFICE/OUTPT VISIT,PROCEDURE ONLY: CPT | Performed by: PODIATRIST

## 2025-02-11 NOTE — PROGRESS NOTES
with evening meal 24  Yes Gilda Thakkar,    dulaglutide (TRULICITY) 1.5 MG/0.5ML SC injection Inject 0.5 mLs into the skin once a week 10/4/24  Yes Gilda Thakkar DO   rosuvastatin (CRESTOR) 20 MG tablet Take 1 tablet by mouth nightly 24  Yes Blanca Smith APRN - CNP   ezetimibe (ZETIA) 10 MG tablet Take 1 tablet by mouth daily 24  Yes Blanca Smith APRN - CNP   lisinopril-hydroCHLOROthiazide (PRINZIDE;ZESTORETIC) 20-25 MG per tablet Take 1 tablet by mouth daily 24  Yes Gilda Thakkar DO   metoprolol tartrate (LOPRESSOR) 50 MG tablet Take 1 tablet by mouth 2 times daily 24  Yes Gilda Thakkar DO   TURMERIC PO Take by mouth   Yes Negrita Boo MD   Cranberry 1000 MG CAPS Take by mouth   Yes Negrita Boo MD   NONFORMULARY Uses medical marijuana card, gummies once daily   Yes Negrita Boo MD   aspirin 81 MG tablet Take 1 tablet by mouth daily   Yes Negrita Boo MD   omeprazole (PRILOSEC) 20 MG delayed release capsule Take 1 capsule by mouth daily   Yes ProviderNegrita MD       Past Surgical History:   Procedure Laterality Date    CARPAL TUNNEL RELEASE Right 2009    CHOLECYSTECTOMY  2016    laparscopic w/op grams    COLONOSCOPY  10/1/15    normal    HIP OSTEOTOMY Left     HIP OSTEOTOMY Right     Varus derotation.    LITHOTRIPSY Right 1991    With cystoscopy and ureteral catheter placement.    LUMBAR FUSION  2005    L4-L5.    OTHER SURGICAL HISTORY      pelvic surgery    TOTAL HIP ARTHROPLASTY Left        Family History   Problem Relation Age of Onset    Heart Disease Mother     Heart Disease Father     Heart Attack Brother         age 51    Heart Disease Paternal Uncle        Social History     Tobacco Use    Smoking status: Former     Current packs/day: 0.00     Types: Cigarettes, Cigars     Quit date: 10/9/1999     Years since quittin.3    Smokeless tobacco: Never    Tobacco comments:     quit smoking

## 2025-04-22 ENCOUNTER — OFFICE VISIT (OUTPATIENT)
Dept: PODIATRY | Age: 60
End: 2025-04-22
Payer: COMMERCIAL

## 2025-04-22 VITALS — HEART RATE: 68 BPM | SYSTOLIC BLOOD PRESSURE: 122 MMHG | DIASTOLIC BLOOD PRESSURE: 74 MMHG

## 2025-04-22 DIAGNOSIS — E11.51 CONTROLLED TYPE 2 DM WITH PERIPHERAL CIRCULATORY DISORDER (HCC): Primary | ICD-10-CM

## 2025-04-22 DIAGNOSIS — B35.1 DERMATOPHYTOSIS OF NAIL: ICD-10-CM

## 2025-04-22 PROCEDURE — 99999 PR OFFICE/OUTPT VISIT,PROCEDURE ONLY: CPT | Performed by: PODIATRIST

## 2025-04-22 PROCEDURE — 11721 DEBRIDE NAIL 6 OR MORE: CPT | Performed by: PODIATRIST

## 2025-04-22 NOTE — PROGRESS NOTES
Foot Care Worksheet  PCP: Gilda Thakkar DO  Last visit:  11 / 13 / 2024    Nail description: Thick , Yellow , Crumbly , Marked limitation of ambulation     Pain resulting from thickened and dystrophy of nail plate No    Nails involved  Right   1, 2, 3, 4, 5  (T5-T9)  Left     1, 2, 3, 4, 5  (TA-T4)    Q7 1 Class A Finding - Non traumatic amputation of foot No    Q8 2 Class B Findings - Absent DP pulse No, Absent PT pulse No, Advanced tropic changes (3 required) Yes    Decrease hair growth No, Nail changes/thickening Yes, Pigmented changes/discoloration Yes, Skin texture (thin, shiny) Yes, Skin color (rubor/redness) No    Q9 1 Class B and 2 Class C Findings  Claudication No, Temperature change Yes, Paresthesia Yes, Burning No, Edema Yes    Subjective:  Petey Ny is a 59 y.o. male who presents to the office today for routine DM foot care. Currently denies F/C/N/V.     Allergies   Allergen Reactions    Fenofibrate Rash       Past Medical History:   Diagnosis Date    Arthritis     Carpal tunnel syndrome     Bilateral.    Cerebral palsy     Chest pain     Atypical, history of.    Chronic arthralgias of knees and hips     Degenerative disc disease     Lumbar.    Diabetes mellitus, type 2 (HCC)     Hip joint valgus deformity     Right hip secondary to muscle imbalance and cerebral palsy.    History of blood transfusion     History of kidney stones     Hypertension     Pyelonephritis 10/14/1988    Reflux     Sleep apnea     Spasticity     Severe, lower extremities, with multiple procedures to the hips and knees.    Ureteral calculi 06/04/1991    Right with obstruction.       Prior to Admission medications    Medication Sig Start Date End Date Taking? Authorizing Provider   cyclobenzaprine (FLEXERIL) 10 MG tablet Take 1 tablet by mouth 2 times daily as needed for Muscle spasms 1/11/25  Yes Gilda Thakkar DO   metFORMIN (GLUCOPHAGE-XR) 500 MG extended release tablet Take 2 tablets by mouth with breakfast and with

## 2025-05-13 ENCOUNTER — OFFICE VISIT (OUTPATIENT)
Dept: FAMILY MEDICINE CLINIC | Age: 60
End: 2025-05-13
Payer: COMMERCIAL

## 2025-05-13 ENCOUNTER — RESULTS FOLLOW-UP (OUTPATIENT)
Dept: FAMILY MEDICINE CLINIC | Age: 60
End: 2025-05-13

## 2025-05-13 ENCOUNTER — TELEPHONE (OUTPATIENT)
Dept: SURGERY | Age: 60
End: 2025-05-13

## 2025-05-13 ENCOUNTER — HOSPITAL ENCOUNTER (OUTPATIENT)
Dept: GENERAL RADIOLOGY | Age: 60
Discharge: HOME | End: 2025-05-15
Payer: MEDICARE

## 2025-05-13 VITALS
HEART RATE: 79 BPM | BODY MASS INDEX: 33.8 KG/M2 | RESPIRATION RATE: 18 BRPM | SYSTOLIC BLOOD PRESSURE: 122 MMHG | OXYGEN SATURATION: 96 % | DIASTOLIC BLOOD PRESSURE: 70 MMHG | WEIGHT: 255 LBS | HEIGHT: 73 IN | TEMPERATURE: 97.7 F

## 2025-05-13 DIAGNOSIS — E11.9 TYPE 2 DIABETES MELLITUS WITHOUT COMPLICATION, WITHOUT LONG-TERM CURRENT USE OF INSULIN (HCC): Primary | ICD-10-CM

## 2025-05-13 DIAGNOSIS — Z12.11 SCREENING FOR COLON CANCER: ICD-10-CM

## 2025-05-13 DIAGNOSIS — M25.562 ACUTE PAIN OF LEFT KNEE: ICD-10-CM

## 2025-05-13 DIAGNOSIS — G80.8 OTHER CEREBRAL PALSY (HCC): ICD-10-CM

## 2025-05-13 DIAGNOSIS — I10 ESSENTIAL HYPERTENSION: ICD-10-CM

## 2025-05-13 DIAGNOSIS — Z12.5 SCREENING FOR PROSTATE CANCER: ICD-10-CM

## 2025-05-13 DIAGNOSIS — G47.33 OBSTRUCTIVE SLEEP APNEA SYNDROME: ICD-10-CM

## 2025-05-13 DIAGNOSIS — Z23 NEED FOR TETANUS BOOSTER: ICD-10-CM

## 2025-05-13 DIAGNOSIS — E78.2 MIXED HYPERLIPIDEMIA: ICD-10-CM

## 2025-05-13 LAB — HBA1C MFR BLD: 6.2 %

## 2025-05-13 PROCEDURE — 3074F SYST BP LT 130 MM HG: CPT | Performed by: FAMILY MEDICINE

## 2025-05-13 PROCEDURE — 3017F COLORECTAL CA SCREEN DOC REV: CPT | Performed by: FAMILY MEDICINE

## 2025-05-13 PROCEDURE — 83036 HEMOGLOBIN GLYCOSYLATED A1C: CPT | Performed by: FAMILY MEDICINE

## 2025-05-13 PROCEDURE — G8417 CALC BMI ABV UP PARAM F/U: HCPCS | Performed by: FAMILY MEDICINE

## 2025-05-13 PROCEDURE — 3078F DIAST BP <80 MM HG: CPT | Performed by: FAMILY MEDICINE

## 2025-05-13 PROCEDURE — 90471 IMMUNIZATION ADMIN: CPT | Performed by: FAMILY MEDICINE

## 2025-05-13 PROCEDURE — G2211 COMPLEX E/M VISIT ADD ON: HCPCS | Performed by: FAMILY MEDICINE

## 2025-05-13 PROCEDURE — 73562 X-RAY EXAM OF KNEE 3: CPT

## 2025-05-13 PROCEDURE — G8427 DOCREV CUR MEDS BY ELIG CLIN: HCPCS | Performed by: FAMILY MEDICINE

## 2025-05-13 PROCEDURE — 99214 OFFICE O/P EST MOD 30 MIN: CPT | Performed by: FAMILY MEDICINE

## 2025-05-13 PROCEDURE — 2022F DILAT RTA XM EVC RTNOPTHY: CPT | Performed by: FAMILY MEDICINE

## 2025-05-13 PROCEDURE — 3044F HG A1C LEVEL LT 7.0%: CPT | Performed by: FAMILY MEDICINE

## 2025-05-13 PROCEDURE — 1036F TOBACCO NON-USER: CPT | Performed by: FAMILY MEDICINE

## 2025-05-13 PROCEDURE — 90715 TDAP VACCINE 7 YRS/> IM: CPT | Performed by: FAMILY MEDICINE

## 2025-05-13 RX ORDER — METOPROLOL TARTRATE 50 MG
50 TABLET ORAL 2 TIMES DAILY
Qty: 180 TABLET | Refills: 3 | Status: SHIPPED | OUTPATIENT
Start: 2025-05-13

## 2025-05-13 SDOH — ECONOMIC STABILITY: FOOD INSECURITY: WITHIN THE PAST 12 MONTHS, YOU WORRIED THAT YOUR FOOD WOULD RUN OUT BEFORE YOU GOT MONEY TO BUY MORE.: NEVER TRUE

## 2025-05-13 SDOH — ECONOMIC STABILITY: FOOD INSECURITY: WITHIN THE PAST 12 MONTHS, THE FOOD YOU BOUGHT JUST DIDN'T LAST AND YOU DIDN'T HAVE MONEY TO GET MORE.: NEVER TRUE

## 2025-05-13 ASSESSMENT — PATIENT HEALTH QUESTIONNAIRE - PHQ9
SUM OF ALL RESPONSES TO PHQ QUESTIONS 1-9: 0
1. LITTLE INTEREST OR PLEASURE IN DOING THINGS: NOT AT ALL
2. FEELING DOWN, DEPRESSED OR HOPELESS: NOT AT ALL
SUM OF ALL RESPONSES TO PHQ QUESTIONS 1-9: 0

## 2025-05-13 NOTE — PROGRESS NOTES
[facility]  [address] [place] [address]  [phone]      Petey Ny is a 59 y.o. male who presents today for his medical conditions/complaints as noted below.  Petey Ny is c/o of Diabetes      HPI:     History of Present Illness  The patient is a 59-year-old male who presents today for a follow-up on diabetes.    He reports experiencing two recent falls. The first fall occurred approximately two weeks ago while carrying heavy cement blocks, resulting in a deep muscle injury in his leg. He does not recall any specific incident that could have caused the tear and notes that there was no bruising, although he typically does not bruise easily. He has been using a forearm crutch for support and notes that the condition is improving. However, he experienced another fall last week when attempting to step up on a . His knee is extremely sore to touch and he experiences pain when lifting his leg to put on shoes and socks. The knee was swollen but has since improved. He is able to bear weight on the leg today, although he was unable to do so for the past two days. He took a week off from working out due to the pain but resumed light exercise yesterday. He has not been applying ice to the knee and has not had any previous x-rays of the knee. He estimates that the knee is approximately 50% better since the second fall. He has a history of knee injuries from falls and running into corners.    He reports a decrease in heart fluttering and has not made any changes to his medication regimen. He is not experiencing any chest pain, heaviness, or shortness of breath. He is currently on metoprolol 150 mg twice daily.    He is requesting an updated BiPAP machine as his current one is not functioning optimally. He uses the machine every night with settings at 16/18.    He has a small hernia that causes minimal discomfort unless pressure is applied. He is not

## 2025-07-01 ENCOUNTER — OFFICE VISIT (OUTPATIENT)
Dept: PODIATRY | Age: 60
End: 2025-07-01
Payer: COMMERCIAL

## 2025-07-01 VITALS — SYSTOLIC BLOOD PRESSURE: 118 MMHG | DIASTOLIC BLOOD PRESSURE: 72 MMHG | HEART RATE: 88 BPM | RESPIRATION RATE: 20 BRPM

## 2025-07-01 DIAGNOSIS — E11.51 CONTROLLED TYPE 2 DM WITH PERIPHERAL CIRCULATORY DISORDER (HCC): Primary | ICD-10-CM

## 2025-07-01 DIAGNOSIS — B35.1 DERMATOPHYTOSIS OF NAIL: ICD-10-CM

## 2025-07-01 PROCEDURE — 11721 DEBRIDE NAIL 6 OR MORE: CPT | Performed by: PODIATRIST

## 2025-07-01 PROCEDURE — 99999 PR OFFICE/OUTPT VISIT,PROCEDURE ONLY: CPT | Performed by: PODIATRIST

## 2025-07-01 NOTE — PROGRESS NOTES
Foot Care Worksheet  PCP: Gilda Thakkar DO  Last visit:  05 / 13 / 2025    Nail description: Thick , Yellow , Crumbly , Marked limitation of ambulation     Pain resulting from thickened and dystrophy of nail plate No    Nails involved  Right   1, 2, 3, 4, 5  (T5-T9)  Left     1, 2, 3, 4, 5  (TA-T4)    Q7 1 Class A Finding - Non traumatic amputation of foot No    Q8 2 Class B Findings - Absent DP pulse No, Absent PT pulse No, Advanced tropic changes (3 required) Yes    Decrease hair growth No, Nail changes/thickening Yes, Pigmented changes/discoloration Yes, Skin texture (thin, shiny) Yes, Skin color (rubor/redness) No    Q9 1 Class B and 2 Class C Findings  Claudication No, Temperature change Yes, Paresthesia Yes, Burning No, Edema Yes    Subjective:  Petey Ny is a 59 y.o. male who presents to the office today for routine DM foot care. Currently denies F/C/N/V.     Allergies   Allergen Reactions    Fenofibrate Rash       Past Medical History:   Diagnosis Date    Arthritis     Carpal tunnel syndrome     Bilateral.    Cerebral palsy (HCC)     Chest pain     Atypical, history of.    Chronic arthralgias of knees and hips     Degenerative disc disease     Lumbar.    Diabetes mellitus, type 2 (HCC)     Hip joint valgus deformity     Right hip secondary to muscle imbalance and cerebral palsy.    History of blood transfusion     History of kidney stones     Hypertension     Pyelonephritis 10/14/1988    Reflux     Sleep apnea     Spasticity     Severe, lower extremities, with multiple procedures to the hips and knees.    Ureteral calculi 06/04/1991    Right with obstruction.       Prior to Admission medications    Medication Sig Start Date End Date Taking? Authorizing Provider   metoprolol tartrate (LOPRESSOR) 50 MG tablet Take 1 tablet by mouth 2 times daily 5/13/25  Yes Gilda Thakkar DO   Respiratory Therapy Supplies [facility] BPAP machine and supplies. Pressure setting at 16/18 cm. 5/13/25  Yes Gilda Thakkar DO

## 2025-08-04 ENCOUNTER — PATIENT MESSAGE (OUTPATIENT)
Dept: FAMILY MEDICINE CLINIC | Age: 60
End: 2025-08-04

## 2025-08-04 DIAGNOSIS — G80.8 OTHER CEREBRAL PALSY (HCC): Primary | ICD-10-CM

## 2025-08-04 DIAGNOSIS — Z99.3 WHEELCHAIR DEPENDENT: ICD-10-CM

## 2025-08-06 ENCOUNTER — APPOINTMENT (OUTPATIENT)
Dept: CT IMAGING | Age: 60
End: 2025-08-06
Payer: MEDICARE

## 2025-08-06 ENCOUNTER — PATIENT MESSAGE (OUTPATIENT)
Dept: FAMILY MEDICINE CLINIC | Age: 60
End: 2025-08-06

## 2025-08-06 ENCOUNTER — HOSPITAL ENCOUNTER (EMERGENCY)
Age: 60
Discharge: HOME | End: 2025-08-07
Attending: EMERGENCY MEDICINE
Payer: MEDICARE

## 2025-08-06 DIAGNOSIS — N10 ACUTE PYELITIS: Primary | ICD-10-CM

## 2025-08-06 LAB
ALBUMIN SERPL-MCNC: 4.2 G/DL (ref 3.5–5.2)
ALBUMIN/GLOB SERPL: 1.6 {RATIO} (ref 1–2.5)
ALP SERPL-CCNC: 81 U/L (ref 40–129)
ALT SERPL-CCNC: 13 U/L (ref 10–50)
ANION GAP SERPL CALCULATED.3IONS-SCNC: 14 MMOL/L (ref 9–16)
AST SERPL-CCNC: 19 U/L (ref 10–50)
BACTERIA URNS QL MICRO: ABNORMAL
BASOPHILS # BLD: 0.06 K/UL (ref 0–0.2)
BASOPHILS NFR BLD: 0 % (ref 0–2)
BILIRUB DIRECT SERPL-MCNC: 0.4 MG/DL (ref 0–0.2)
BILIRUB INDIRECT SERPL-MCNC: 0.2 MG/DL (ref 0–1)
BILIRUB SERPL-MCNC: 0.6 MG/DL (ref 0–1.2)
BILIRUB UR QL STRIP: ABNORMAL
BUN SERPL-MCNC: 11 MG/DL (ref 6–20)
CALCIUM SERPL-MCNC: 9.3 MG/DL (ref 8.6–10.4)
CHARACTER UR: ABNORMAL
CHLORIDE SERPL-SCNC: 99 MMOL/L (ref 98–107)
CLARITY UR: ABNORMAL
CO2 SERPL-SCNC: 24 MMOL/L (ref 20–31)
COLOR UR: ABNORMAL
CREAT SERPL-MCNC: 0.9 MG/DL (ref 0.7–1.2)
EOSINOPHIL # BLD: 0.1 K/UL (ref 0–0.44)
EOSINOPHILS RELATIVE PERCENT: 1 % (ref 1–4)
EPI CELLS #/AREA URNS HPF: ABNORMAL /HPF (ref 0–5)
ERYTHROCYTE [DISTWIDTH] IN BLOOD BY AUTOMATED COUNT: 14.6 % (ref 11.8–14.4)
GFR, ESTIMATED: >90 ML/MIN/1.73M2
GLUCOSE SERPL-MCNC: 150 MG/DL (ref 74–99)
GLUCOSE UR STRIP-MCNC: NEGATIVE MG/DL
HCT VFR BLD AUTO: 41.1 % (ref 40.7–50.3)
HGB BLD-MCNC: 13.2 G/DL (ref 13–17)
HGB UR QL STRIP.AUTO: ABNORMAL
IMM GRANULOCYTES # BLD AUTO: 0.06 K/UL (ref 0–0.3)
IMM GRANULOCYTES NFR BLD: 0 %
KETONES UR STRIP-MCNC: ABNORMAL MG/DL
LACTATE BLDV-SCNC: 2.1 MMOL/L (ref 0.5–2.2)
LACTATE BLDV-SCNC: 2.2 MMOL/L (ref 0.5–2.2)
LACTATE BLDV-SCNC: 3.5 MMOL/L (ref 0.5–1.9)
LEUKOCYTE ESTERASE UR QL STRIP: ABNORMAL
LYMPHOCYTES NFR BLD: 1.4 K/UL (ref 1.1–3.7)
LYMPHOCYTES RELATIVE PERCENT: 10 % (ref 24–43)
MCH RBC QN AUTO: 24.8 PG (ref 25.2–33.5)
MCHC RBC AUTO-ENTMCNC: 32.1 G/DL (ref 25.2–33.5)
MCV RBC AUTO: 77.1 FL (ref 82.6–102.9)
MONOCYTES NFR BLD: 0.98 K/UL (ref 0.1–1.2)
MONOCYTES NFR BLD: 7 % (ref 3–12)
NEUTROPHILS NFR BLD: 82 % (ref 36–65)
NEUTS SEG NFR BLD: 11 K/UL (ref 1.5–8.1)
NITRITE UR QL STRIP: POSITIVE
NRBC BLD-RTO: 0 PER 100 WBC
PH UR STRIP: 6.5 [PH] (ref 5–6)
PLATELET # BLD AUTO: 238 K/UL (ref 138–453)
PMV BLD AUTO: 9.3 FL (ref 8.1–13.5)
POTASSIUM SERPL-SCNC: 3.8 MMOL/L (ref 3.7–5.3)
PROT SERPL-MCNC: 6.8 G/DL (ref 6.6–8.7)
PROT UR STRIP-MCNC: ABNORMAL MG/DL
RBC # BLD AUTO: 5.33 M/UL (ref 4.21–5.77)
RBC # BLD: ABNORMAL 10*6/UL
RBC #/AREA URNS HPF: ABNORMAL /HPF (ref 0–4)
SODIUM SERPL-SCNC: 137 MMOL/L (ref 136–145)
SP GR UR STRIP: 1.02 (ref 1.01–1.02)
UROBILINOGEN UR STRIP-ACNC: ABNORMAL EU/DL (ref 0–1)
WBC #/AREA URNS HPF: ABNORMAL /HPF (ref 0–4)
WBC OTHER # BLD: 13.6 K/UL (ref 3.5–11.3)

## 2025-08-06 PROCEDURE — 99284 EMERGENCY DEPT VISIT MOD MDM: CPT

## 2025-08-06 PROCEDURE — 87088 URINE BACTERIA CULTURE: CPT

## 2025-08-06 PROCEDURE — 93005 ELECTROCARDIOGRAM TRACING: CPT | Performed by: EMERGENCY MEDICINE

## 2025-08-06 PROCEDURE — 87186 SC STD MICRODIL/AGAR DIL: CPT

## 2025-08-06 PROCEDURE — 36415 COLL VENOUS BLD VENIPUNCTURE: CPT

## 2025-08-06 PROCEDURE — 80053 COMPREHEN METABOLIC PANEL: CPT

## 2025-08-06 PROCEDURE — 96374 THER/PROPH/DIAG INJ IV PUSH: CPT

## 2025-08-06 PROCEDURE — 83605 ASSAY OF LACTIC ACID: CPT

## 2025-08-06 PROCEDURE — 51701 INSERT BLADDER CATHETER: CPT

## 2025-08-06 PROCEDURE — 6360000002 HC RX W HCPCS: Performed by: EMERGENCY MEDICINE

## 2025-08-06 PROCEDURE — 2580000003 HC RX 258: Performed by: EMERGENCY MEDICINE

## 2025-08-06 PROCEDURE — 51798 US URINE CAPACITY MEASURE: CPT

## 2025-08-06 PROCEDURE — 6370000000 HC RX 637 (ALT 250 FOR IP): Performed by: EMERGENCY MEDICINE

## 2025-08-06 PROCEDURE — 87086 URINE CULTURE/COLONY COUNT: CPT

## 2025-08-06 PROCEDURE — 82248 BILIRUBIN DIRECT: CPT

## 2025-08-06 PROCEDURE — 81001 URINALYSIS AUTO W/SCOPE: CPT

## 2025-08-06 PROCEDURE — 87040 BLOOD CULTURE FOR BACTERIA: CPT

## 2025-08-06 PROCEDURE — 85025 COMPLETE CBC W/AUTO DIFF WBC: CPT

## 2025-08-06 PROCEDURE — 74176 CT ABD & PELVIS W/O CONTRAST: CPT

## 2025-08-06 PROCEDURE — 2500000003 HC RX 250 WO HCPCS: Performed by: EMERGENCY MEDICINE

## 2025-08-06 RX ORDER — METOPROLOL TARTRATE 50 MG
50 TABLET ORAL ONCE
Status: COMPLETED | OUTPATIENT
Start: 2025-08-06 | End: 2025-08-06

## 2025-08-06 RX ORDER — SODIUM CHLORIDE, SODIUM LACTATE, POTASSIUM CHLORIDE, AND CALCIUM CHLORIDE .6; .31; .03; .02 G/100ML; G/100ML; G/100ML; G/100ML
500 INJECTION, SOLUTION INTRAVENOUS ONCE
Status: DISCONTINUED | OUTPATIENT
Start: 2025-08-06 | End: 2025-08-07 | Stop reason: HOSPADM

## 2025-08-06 RX ORDER — CEPHALEXIN 500 MG/1
500 CAPSULE ORAL 4 TIMES DAILY
Qty: 40 CAPSULE | Refills: 0 | Status: SHIPPED | OUTPATIENT
Start: 2025-08-06 | End: 2025-08-16

## 2025-08-06 RX ORDER — SODIUM CHLORIDE, SODIUM LACTATE, POTASSIUM CHLORIDE, AND CALCIUM CHLORIDE .6; .31; .03; .02 G/100ML; G/100ML; G/100ML; G/100ML
500 INJECTION, SOLUTION INTRAVENOUS ONCE
Status: COMPLETED | OUTPATIENT
Start: 2025-08-06 | End: 2025-08-06

## 2025-08-06 RX ORDER — 0.9 % SODIUM CHLORIDE 0.9 %
1000 INTRAVENOUS SOLUTION INTRAVENOUS ONCE
Status: COMPLETED | OUTPATIENT
Start: 2025-08-06 | End: 2025-08-06

## 2025-08-06 RX ADMIN — METOPROLOL TARTRATE 50 MG: 50 TABLET, FILM COATED ORAL at 22:58

## 2025-08-06 RX ADMIN — SODIUM CHLORIDE 1000 ML: 0.9 INJECTION, SOLUTION INTRAVENOUS at 18:28

## 2025-08-06 RX ADMIN — SODIUM CHLORIDE, POTASSIUM CHLORIDE, SODIUM LACTATE AND CALCIUM CHLORIDE 500 ML: 600; 310; 30; 20 INJECTION, SOLUTION INTRAVENOUS at 21:05

## 2025-08-06 RX ADMIN — WATER 1000 MG: 1 INJECTION INTRAMUSCULAR; INTRAVENOUS; SUBCUTANEOUS at 19:30

## 2025-08-06 RX ADMIN — SODIUM CHLORIDE 1000 ML: 9 INJECTION, SOLUTION INTRAVENOUS at 19:30

## 2025-08-06 ASSESSMENT — ENCOUNTER SYMPTOMS
DIARRHEA: 0
SHORTNESS OF BREATH: 0
ABDOMINAL PAIN: 0
WHEEZING: 0
NAUSEA: 0
VOMITING: 0
BACK PAIN: 0
BLOOD IN STOOL: 0
CONSTIPATION: 0
SORE THROAT: 0
TROUBLE SWALLOWING: 0

## 2025-08-06 ASSESSMENT — PAIN DESCRIPTION - LOCATION: LOCATION: ABDOMEN

## 2025-08-06 ASSESSMENT — PAIN - FUNCTIONAL ASSESSMENT: PAIN_FUNCTIONAL_ASSESSMENT: 0-10

## 2025-08-06 ASSESSMENT — PAIN DESCRIPTION - DESCRIPTORS: DESCRIPTORS: PRESSURE

## 2025-08-06 ASSESSMENT — LIFESTYLE VARIABLES
HOW MANY STANDARD DRINKS CONTAINING ALCOHOL DO YOU HAVE ON A TYPICAL DAY: 5 OR 6
HOW OFTEN DO YOU HAVE A DRINK CONTAINING ALCOHOL: 2-4 TIMES A MONTH

## 2025-08-06 ASSESSMENT — PAIN DESCRIPTION - ORIENTATION: ORIENTATION: LOWER

## 2025-08-07 VITALS
DIASTOLIC BLOOD PRESSURE: 75 MMHG | BODY MASS INDEX: 33.8 KG/M2 | WEIGHT: 255 LBS | SYSTOLIC BLOOD PRESSURE: 121 MMHG | HEIGHT: 73 IN | TEMPERATURE: 99.5 F | RESPIRATION RATE: 16 BRPM | OXYGEN SATURATION: 99 % | HEART RATE: 99 BPM

## 2025-08-07 DIAGNOSIS — I10 ESSENTIAL HYPERTENSION: ICD-10-CM

## 2025-08-07 RX ORDER — METOPROLOL TARTRATE 50 MG
50 TABLET ORAL 2 TIMES DAILY
Qty: 180 TABLET | Refills: 3 | OUTPATIENT
Start: 2025-08-07

## 2025-08-08 ENCOUNTER — CARE COORDINATION (OUTPATIENT)
Dept: CARE COORDINATION | Age: 60
End: 2025-08-08

## 2025-08-08 DIAGNOSIS — R94.31 ABNORMAL ECG: ICD-10-CM

## 2025-08-08 DIAGNOSIS — I10 HYPERTENSION, ESSENTIAL: ICD-10-CM

## 2025-08-08 DIAGNOSIS — I50.23 CHF NYHA CLASS II, ACUTE ON CHRONIC, SYSTOLIC (HCC): ICD-10-CM

## 2025-08-08 DIAGNOSIS — R06.02 SOB (SHORTNESS OF BREATH): ICD-10-CM

## 2025-08-08 DIAGNOSIS — I10 ESSENTIAL HYPERTENSION: ICD-10-CM

## 2025-08-08 DIAGNOSIS — E11.9 CONTROLLED TYPE 2 DIABETES MELLITUS WITHOUT COMPLICATION, UNSPECIFIED WHETHER LONG TERM INSULIN USE (HCC): ICD-10-CM

## 2025-08-08 DIAGNOSIS — E78.00 PURE HYPERCHOLESTEROLEMIA: ICD-10-CM

## 2025-08-08 DIAGNOSIS — R00.2 PALPITATIONS: ICD-10-CM

## 2025-08-08 LAB
EKG ATRIAL RATE: 110 BPM
EKG P AXIS: 57 DEGREES
EKG P-R INTERVAL: 184 MS
EKG Q-T INTERVAL: 352 MS
EKG QRS DURATION: 74 MS
EKG QTC CALCULATION (BAZETT): 476 MS
EKG R AXIS: 25 DEGREES
EKG T AXIS: 15 DEGREES
EKG VENTRICULAR RATE: 110 BPM
MICROORGANISM SPEC CULT: ABNORMAL
SPECIMEN DESCRIPTION: ABNORMAL

## 2025-08-08 RX ORDER — ROSUVASTATIN CALCIUM 20 MG/1
20 TABLET, COATED ORAL NIGHTLY
Qty: 90 TABLET | Refills: 3 | OUTPATIENT
Start: 2025-08-08

## 2025-08-08 RX ORDER — EZETIMIBE 10 MG/1
10 TABLET ORAL DAILY
Qty: 90 TABLET | Refills: 3 | OUTPATIENT
Start: 2025-08-08

## 2025-08-10 LAB
MICROORGANISM SPEC CULT: NORMAL
MICROORGANISM SPEC CULT: NORMAL
SERVICE CMNT-IMP: NORMAL
SERVICE CMNT-IMP: NORMAL
SPECIMEN DESCRIPTION: NORMAL
SPECIMEN DESCRIPTION: NORMAL

## 2025-08-10 RX ORDER — LISINOPRIL AND HYDROCHLOROTHIAZIDE 20; 25 MG/1; MG/1
1 TABLET ORAL DAILY
Qty: 90 TABLET | Refills: 3 | Status: SHIPPED | OUTPATIENT
Start: 2025-08-10

## 2025-08-13 ENCOUNTER — OFFICE VISIT (OUTPATIENT)
Dept: CARDIOLOGY | Age: 60
End: 2025-08-13
Payer: COMMERCIAL

## 2025-08-13 ENCOUNTER — CARE COORDINATION (OUTPATIENT)
Dept: CARE COORDINATION | Age: 60
End: 2025-08-13

## 2025-08-13 VITALS
HEART RATE: 84 BPM | WEIGHT: 255 LBS | HEIGHT: 73 IN | SYSTOLIC BLOOD PRESSURE: 106 MMHG | DIASTOLIC BLOOD PRESSURE: 66 MMHG | OXYGEN SATURATION: 98 % | BODY MASS INDEX: 33.8 KG/M2

## 2025-08-13 DIAGNOSIS — E78.00 PURE HYPERCHOLESTEROLEMIA: ICD-10-CM

## 2025-08-13 DIAGNOSIS — E11.9 CONTROLLED TYPE 2 DIABETES MELLITUS WITHOUT COMPLICATION, UNSPECIFIED WHETHER LONG TERM INSULIN USE (HCC): ICD-10-CM

## 2025-08-13 DIAGNOSIS — R94.31 ABNORMAL ECG: ICD-10-CM

## 2025-08-13 DIAGNOSIS — I10 HYPERTENSION, ESSENTIAL: Primary | ICD-10-CM

## 2025-08-13 DIAGNOSIS — I50.23 CHF NYHA CLASS II, ACUTE ON CHRONIC, SYSTOLIC (HCC): ICD-10-CM

## 2025-08-13 DIAGNOSIS — R06.02 SOB (SHORTNESS OF BREATH): ICD-10-CM

## 2025-08-13 DIAGNOSIS — R00.2 PALPITATIONS: ICD-10-CM

## 2025-08-13 PROCEDURE — G8427 DOCREV CUR MEDS BY ELIG CLIN: HCPCS | Performed by: NURSE PRACTITIONER

## 2025-08-13 PROCEDURE — 3074F SYST BP LT 130 MM HG: CPT | Performed by: NURSE PRACTITIONER

## 2025-08-13 PROCEDURE — 3044F HG A1C LEVEL LT 7.0%: CPT | Performed by: NURSE PRACTITIONER

## 2025-08-13 PROCEDURE — 3078F DIAST BP <80 MM HG: CPT | Performed by: NURSE PRACTITIONER

## 2025-08-13 PROCEDURE — 1036F TOBACCO NON-USER: CPT | Performed by: NURSE PRACTITIONER

## 2025-08-13 PROCEDURE — 2022F DILAT RTA XM EVC RTNOPTHY: CPT | Performed by: NURSE PRACTITIONER

## 2025-08-13 PROCEDURE — 3017F COLORECTAL CA SCREEN DOC REV: CPT | Performed by: NURSE PRACTITIONER

## 2025-08-13 PROCEDURE — G8417 CALC BMI ABV UP PARAM F/U: HCPCS | Performed by: NURSE PRACTITIONER

## 2025-08-13 PROCEDURE — 99214 OFFICE O/P EST MOD 30 MIN: CPT | Performed by: NURSE PRACTITIONER

## 2025-08-13 RX ORDER — EZETIMIBE 10 MG/1
10 TABLET ORAL DAILY
Qty: 90 TABLET | Refills: 3 | Status: SHIPPED | OUTPATIENT
Start: 2025-08-13

## 2025-08-13 RX ORDER — ROSUVASTATIN CALCIUM 20 MG/1
20 TABLET, COATED ORAL NIGHTLY
Qty: 90 TABLET | Refills: 3 | Status: SHIPPED | OUTPATIENT
Start: 2025-08-13

## 2025-08-29 DIAGNOSIS — E11.9 TYPE 2 DIABETES MELLITUS WITHOUT COMPLICATION, WITHOUT LONG-TERM CURRENT USE OF INSULIN (HCC): ICD-10-CM

## 2025-08-31 RX ORDER — DULAGLUTIDE 1.5 MG/.5ML
1.5 INJECTION, SOLUTION SUBCUTANEOUS WEEKLY
Qty: 6 ML | Refills: 0 | Status: SHIPPED | OUTPATIENT
Start: 2025-08-31

## 2025-09-03 ENCOUNTER — CARE COORDINATION (OUTPATIENT)
Dept: CARE COORDINATION | Age: 60
End: 2025-09-03